# Patient Record
Sex: FEMALE | Race: WHITE | NOT HISPANIC OR LATINO | Employment: PART TIME | URBAN - METROPOLITAN AREA
[De-identification: names, ages, dates, MRNs, and addresses within clinical notes are randomized per-mention and may not be internally consistent; named-entity substitution may affect disease eponyms.]

---

## 2017-01-09 ENCOUNTER — INFUSION THERAPY VISIT (OUTPATIENT)
Dept: INFUSION THERAPY | Facility: CLINIC | Age: 37
End: 2017-01-09
Attending: ALLERGY & IMMUNOLOGY
Payer: COMMERCIAL

## 2017-01-09 ENCOUNTER — TELEPHONE (OUTPATIENT)
Dept: SURGERY | Facility: CLINIC | Age: 37
End: 2017-01-09

## 2017-01-09 ENCOUNTER — OFFICE VISIT (OUTPATIENT)
Dept: FAMILY MEDICINE | Facility: CLINIC | Age: 37
End: 2017-01-09
Payer: COMMERCIAL

## 2017-01-09 VITALS
SYSTOLIC BLOOD PRESSURE: 121 MMHG | WEIGHT: 164 LBS | DIASTOLIC BLOOD PRESSURE: 88 MMHG | BODY MASS INDEX: 30.18 KG/M2 | HEIGHT: 62 IN | HEART RATE: 94 BPM | TEMPERATURE: 98 F

## 2017-01-09 VITALS — SYSTOLIC BLOOD PRESSURE: 137 MMHG | DIASTOLIC BLOOD PRESSURE: 95 MMHG | TEMPERATURE: 98.2 F | HEART RATE: 94 BPM

## 2017-01-09 DIAGNOSIS — M06.9 RHEUMATOID ARTHRITIS, INVOLVING UNSPECIFIED SITE, UNSPECIFIED RHEUMATOID FACTOR PRESENCE: ICD-10-CM

## 2017-01-09 DIAGNOSIS — G89.4 CHRONIC PAIN SYNDROME: ICD-10-CM

## 2017-01-09 DIAGNOSIS — K83.4 SPASM OF SPHINCTER OF ODDI: ICD-10-CM

## 2017-01-09 DIAGNOSIS — Z98.890 S/P NISSEN FUNDOPLICATION (WITHOUT GASTROSTOMY TUBE) PROCEDURE: ICD-10-CM

## 2017-01-09 DIAGNOSIS — L50.8 CHRONIC URTICARIA: Primary | ICD-10-CM

## 2017-01-09 DIAGNOSIS — F41.8 SITUATIONAL ANXIETY: Primary | ICD-10-CM

## 2017-01-09 PROCEDURE — 99213 OFFICE O/P EST LOW 20 MIN: CPT | Performed by: NURSE PRACTITIONER

## 2017-01-09 PROCEDURE — 96401 CHEMO ANTI-NEOPL SQ/IM: CPT

## 2017-01-09 PROCEDURE — 25000128 H RX IP 250 OP 636: Performed by: EMERGENCY MEDICINE

## 2017-01-09 RX ORDER — HYDROXYZINE HYDROCHLORIDE 25 MG/1
25-100 TABLET, FILM COATED ORAL EVERY 6 HOURS PRN
Qty: 120 TABLET | Refills: 1 | Status: SHIPPED | OUTPATIENT
Start: 2017-01-09

## 2017-01-09 RX ORDER — HYDROCODONE BITARTRATE AND ACETAMINOPHEN 5; 325 MG/1; MG/1
1 TABLET ORAL EVERY 6 HOURS PRN
Qty: 60 TABLET | Refills: 0 | Status: SHIPPED | OUTPATIENT
Start: 2017-01-09 | End: 2019-05-22

## 2017-01-09 RX ADMIN — OMALIZUMAB 300 MG: 202.5 INJECTION, SOLUTION SUBCUTANEOUS at 10:32

## 2017-01-09 NOTE — NURSING NOTE
"Chief Complaint   Patient presents with     Hospital F/U     Establish Care     to be able to cosign orders in infusion     Refill Request     hydrocodone and hydroxazine       Initial /88 mmHg  Pulse 94  Temp(Src) 98  F (36.7  C) (Oral)  Ht 5' 1.5\" (1.562 m)  Wt 164 lb (74.39 kg)  BMI 30.49 kg/m2  LMP 11/21/2001 Estimated body mass index is 30.49 kg/(m^2) as calculated from the following:    Height as of this encounter: 5' 1.5\" (1.562 m).    Weight as of this encounter: 164 lb (74.39 kg).  BP completed using cuff size: regular  "

## 2017-01-09 NOTE — PROGRESS NOTES
Infusion Nursing Note:  Demetri Mosley presents today for SQ Xolair and 30 minutes observation.      Patient seen by provider today: No    Note: SQ Xolair administered x 2 injections over 1 minute each without complications. Pt. Observed for 30 minutes after the injections with no adverse reactions. Pt. To return in 2 weeks for SQ Xolair.    Intravenous Access:  No Intravenous access/labs at this visit.    Treatment Conditions:  Not Applicable.      Post Infusion Assessment:  Patient tolerated injections without incident.    Discharge Plan:   Patient and/or family verbalized understanding of discharge instructions and all questions answered.  Patient discharged in stable condition accompanied by: self.  Departure Mode: Ambulatory.    Amanda Briggs RN

## 2017-01-09 NOTE — PATIENT INSTRUCTIONS
Thank you for choosing [facility].  You may be receiving a survey in the mail from Jhonny Rosado regarding your visit today.  Please take a few minutes to complete and return the survey to let us know how we are doing.      If you have questions or concerns, please contact us via [employer] or you can contact your care team at [phone].    Our Clinic hours are:  Monday 6:40 am  to 7:00 pm  Tuesday -Friday 6:40 am to 5:00 pm    The [place] outpatient lab hours are:  Monday - Friday 6:10 am to 4:45 pm  Saturdays 7:00 am to 11:00 am  Appointments are required, call [phone]    If you have clinical questions after hours or would like to schedule an appointment,  call the clinic at [phone].

## 2017-01-09 NOTE — PROGRESS NOTES
SUBJECTIVE:                                                    Demetri Mosley is a 36 year old female who presents to clinic today for the following health issues:    Chief Complaint   Patient presents with     Hospital F/U     Refill Request     hydrocodone and hydroxazine         Hospital Follow-up Visit:/ surgical follow up    Hospital/Nursing Home/IP Rehab Facility: [facility]  Date of Admission: 12/23  Date of Discharge: 12/24  Reason(s) for Admission: GERD;  Nissen fundoplication and EGD            Problems taking medications regularly:  Having trouble swallowing       Medication changes since discharge: None       Problems adhering to non-medication therapy:  None    Summary of hospitalization:  [facility] discharge summary reviewed  Diagnostic Tests/Treatments reviewed.  Follow up needed: none  Other Healthcare Providers Involved in Patient s Care:         None  Update since discharge: improved.     Post Discharge Medication Reconciliation: discharge medications reconciled, continue medications without change.  Plan of care communicated with patient     Coding guidelines for this visit:  Type of Medical   Decision Making Face-to-Face Visit       within 7 Days of discharge Face-to-Face Visit        within 14 days of discharge   Moderate Complexity [id number]   High Complexity [id number]            Patient plans to establish care with Cheri Abbott - she is not available today.  Asking for hydrocodone and vistaril refills today.  Hydrocodone last filled in August by Dr Abbott.    She is also asking for surgical incisions to be checked.  Has noticed a bulge under one of the lap sites.  No redness or drainage from incision sites.        Problem list and histories reviewed & adjusted, as indicated.  Additional history: as documented    Problem list, Medication list, Allergies, and Medical/Social/Surgical histories reviewed in EPIC and updated as  "appropriate.    ROS:  Constitutional, HEENT, cardiovascular, pulmonary, gi and gu systems are negative, except as otherwise noted.    OBJECTIVE:                                                    /88 mmHg  Pulse 94  Temp(Src) 98  F (36.7  C) (Oral)  Ht 5' 1.5\" (1.562 m)  Wt 164 lb (74.39 kg)  BMI 30.49 kg/m2  LMP 11/21/2001  Body mass index is 30.49 kg/(m^2).  GENERAL: healthy, alert and no distress  ABDOMEN: soft, nontender, no hepatosplenomegaly, no masses and bowel sounds normal. Four lap sites present. Left sided incision is the site she is concerned about - incision is intact - no erythema or drainage. Nontender semi-firm nodule palpable under the incision - slightly larger than similar nodules under each incision - likely scar tissue.  PSYCH: mentation appears normal, affect normal/bright         ASSESSMENT/PLAN:                                                        ICD-10-CM    1. Situational anxiety F41.8 hydrOXYzine (ATARAX) 25 MG tablet   2. Rheumatoid arthritis, involving unspecified site, unspecified rheumatoid factor presence (H) M06.9 HYDROcodone-acetaminophen (NORCO) 5-325 MG per tablet - refilled in Dr Abbott's absence.   3. S/P Nissen fundoplication (without gastrostomy tube) procedure Z98.890 Incisions checked - no problems identified.  Reassured patient that areas are healing well.         The risks, benefits and treatment options of prescribed medications or other treatments have been discussed with the patient. The patient verbalized their understanding and should call or follow up if no improvement or if they develop further problems.  EDEL [facility]  "

## 2017-01-11 ENCOUNTER — OFFICE VISIT (OUTPATIENT)
Dept: SURGERY | Facility: CLINIC | Age: 37
End: 2017-01-11
Attending: CLINICAL NURSE SPECIALIST
Payer: COMMERCIAL

## 2017-01-11 VITALS
TEMPERATURE: 98.7 F | SYSTOLIC BLOOD PRESSURE: 121 MMHG | WEIGHT: 162.2 LBS | HEIGHT: 62 IN | DIASTOLIC BLOOD PRESSURE: 87 MMHG | OXYGEN SATURATION: 96 % | RESPIRATION RATE: 17 BRPM | HEART RATE: 89 BPM | BODY MASS INDEX: 29.85 KG/M2

## 2017-01-11 DIAGNOSIS — K21.9 GASTROESOPHAGEAL REFLUX DISEASE WITHOUT ESOPHAGITIS: Primary | ICD-10-CM

## 2017-01-11 PROCEDURE — 99212 OFFICE O/P EST SF 10 MIN: CPT | Mod: ZF

## 2017-01-11 ASSESSMENT — ENCOUNTER SYMPTOMS
HALLUCINATIONS: 0
CONSTIPATION: 0
SINUS CONGESTION: 1
HEMOPTYSIS: 0
SPUTUM PRODUCTION: 0
SEIZURES: 0
BOWEL INCONTINENCE: 0
WEAKNESS: 0
POLYDIPSIA: 0
DYSPNEA ON EXERTION: 0
POLYPHAGIA: 0
COUGH: 1
WEIGHT LOSS: 1
SHORTNESS OF BREATH: 0
MUSCLE WEAKNESS: 0
JOINT SWELLING: 1
PARALYSIS: 0
BLOOD IN STOOL: 0
ARTHRALGIAS: 1
FEVER: 0
SORE THROAT: 0
NIGHT SWEATS: 0
POOR WOUND HEALING: 0
TREMORS: 0
DIFFICULTY URINATING: 1
NECK MASS: 0
HEADACHES: 1
NUMBNESS: 0
STIFFNESS: 1
JAUNDICE: 0
NAUSEA: 1
RESPIRATORY PAIN: 0
DISTURBANCES IN COORDINATION: 0
BACK PAIN: 0
SNORES LOUDLY: 0
MEMORY LOSS: 0
POSTURAL DYSPNEA: 0
TINGLING: 0
FATIGUE: 1
TROUBLE SWALLOWING: 1
ALTERED TEMPERATURE REGULATION: 0
RECTAL PAIN: 0
WHEEZING: 1
DECREASED APPETITE: 1
MYALGIAS: 1
COUGH DISTURBING SLEEP: 0
TASTE DISTURBANCE: 0
DIARRHEA: 1
SKIN CHANGES: 0
MUSCLE CRAMPS: 1
LOSS OF CONSCIOUSNESS: 0
INCREASED ENERGY: 1
DIZZINESS: 0
SPEECH CHANGE: 0
HOARSE VOICE: 1
CHILLS: 0
NAIL CHANGES: 1
BLOATING: 1
RECTAL BLEEDING: 0
ABDOMINAL PAIN: 1
SINUS PAIN: 0
VOMITING: 0
HEARTBURN: 1
HEMATURIA: 0
NECK PAIN: 1
FLANK PAIN: 0
WEIGHT GAIN: 0
SMELL DISTURBANCE: 0
DYSURIA: 1

## 2017-01-11 ASSESSMENT — PAIN SCALES - GENERAL: PAINLEVEL: MODERATE PAIN (4)

## 2017-01-11 NOTE — PROGRESS NOTES
"THORACIC SURGERY FOLLOW UP VISIT    Dear Dr. Charan Vargas,  I saw Mrs. Demetri Mosley in follow-up today. The clinical summary follows:     PREOP DIAGNOSIS   GERD  PROCEDURE   1.  Laparoscopic Nissen fundoplication.    2.  Esophagogastroscopy.     DATE OF PROCEDURE  12/23/2016    COMPLICATIONS  None    INTERVAL STUDIES  None    ETOH Socially  TOB Former Smoker (quit 2010)  BMI 30.5    CARRIE Mosquera has had a really tough time since her surgery. She was not given a work release note when she was discharged from the hospital and thus was required to return to work on POD 3#. She has had a lot of bloating, gas pain, and difficulty advancing her diet. She recently noticed a bulge on the left side of her abdomen. She reported this to be mildly uncomfortable. Because of this lump, she asked to be seen this week rather than next week when her one month follow up appointment was scheduled.    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 Her and her  and kids will be going on a mini-vacation next weekend.     IMPRESSION (K21.9) Gastroesophageal reflux disease without esophagitis  (primary encounter diagnosis)  Comment: abdominal bulge  Plan: UGI next week, continue slowly advancing diet    36 year-old female with a complex medical history who is s/p Nissen fundoplication for significant GERD. She is still having some difficulty with food getting stuck and does sometimes choke on clear liquids. Some dysphagia is expected at this point post-operatively. Her bowels movements are loose which is likely related to her liquid diet. The area under her left abdominal incision does feel thick from scar tissue, however, when pushing the scar tissue aside, no discernible hernia is noted. The \"bulge\" and discomfort she is feeling is likely from scar tissue.    PLAN  I spent a total of 25 minutes with Mrs. Demetri Mosley, more than 50% of " which were spent in counseling, coordination of care, and face-to-face time. I reviewed the plan as follows:  1) UGI next week  2) Continue slowly advancing diet. We will touch base on a weekly basis to make sure that she is having success with this.  Necessary Tests & Appointments: UGI next week as scheduled  She had all her questions answered and is in agreement with the plan.  I appreciate the opportunity to participate in the care of your patient and will keep you updated.  Sincerely,

## 2017-01-11 NOTE — NURSING NOTE
"Demetri Mosley is a 36 year old female who presents for:  Chief Complaint   Patient presents with     Oncology Clinic Visit     return patient visit - 1 month related to laparscopic nissen fundoplication and EGD        Initial Vitals:  /87 mmHg  Pulse 89  Temp(Src) 98.7  F (37.1  C) (Oral)  Resp 17  Ht 1.562 m (5' 1.5\")  Wt 73.573 kg (162 lb 3.2 oz)  BMI 30.15 kg/m2  SpO2 96%  LMP 11/21/2001 Estimated body mass index is 30.15 kg/(m^2) as calculated from the following:    Height as of this encounter: 1.562 m (5' 1.5\").    Weight as of this encounter: 73.573 kg (162 lb 3.2 oz).. Body surface area is 1.79 meters squared. BP completed using cuff size: large  Moderate Pain (4) Patient's last menstrual period was 11/21/2001. Allergies and medications reviewed.     Medications: Medication refills not needed today.  Pharmacy name entered into [employer]:    [facility]  [facility] - [place], [place] - [address]  WRITTEN PRESCRIPTION REQUESTED  [facility] [place] - [address]  [facility] [place] - [address] [facility]  [id number] [place] - [address]  [facility] - [place], [place] - [address] [facility]  [id number] - [place], [place] - [address]     Comments: patient mentioned moderate pain/discomfort located in the lower left incision and joint pain. Patient stated that she reviewed her medications via tablet and that nothing has changed since surgery - during medication reconciliation.     5 minutes for nursing intake (face to face time)   Jazzmine Morales CMA          "

## 2017-01-11 NOTE — Clinical Note
1/11/2017       RE: Demetri Mosley  [address] [facility] [address]     Dear Colleague,    Thank you for referring your patient, Demetri Mosley, to the [facility]. Please see a copy of my visit note below.    THORACIC SURGERY FOLLOW UP VISIT    Dear Dr. Charan Vargas,  I saw Mrs. Demetri Mosley in follow-up today. The clinical summary follows:     PREOP DIAGNOSIS   GERD  PROCEDURE   1.  Laparoscopic Nissen fundoplication.    2.  Esophagogastroscopy.     DATE OF PROCEDURE  12/23/2016    COMPLICATIONS  None    INTERVAL STUDIES  None    ETOH Socially  TOB Former Smoker (quit 2010)  BMI 30.5    SUBJECTIVE  Demetri has had a really tough time since her surgery. She was not given a work release note when she was discharged from the hospital and thus was required to return to work on POD 3#. She has had a lot of bloating, gas pain, and difficulty advancing her diet. She recently noticed a bulge on the left side of her abdomen. She reported this to be mildly uncomfortable. Because of this lump, she asked to be seen this week rather than next week when her one month follow up appointment was scheduled.    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 Her and her  and kids will be going on a mini-vacation next weekend.     IMPRESSION (K21.9) Gastroesophageal reflux disease without esophagitis  (primary encounter diagnosis)  Comment: abdominal bulge  Plan: UGI next week, continue slowly advancing diet    36 year-old female with a complex medical history who is s/p Nissen fundoplication for significant GERD. She is still having some difficulty with food getting stuck and does sometimes choke on clear liquids. Some dysphagia is expected at this point post-operatively. Her bowels movements are loose which is likely related to her liquid diet. The area under her left abdominal incision does feel thick from scar  "tissue, however, when pushing the scar tissue aside, no discernible hernia is noted. The \"bulge\" and discomfort she is feeling is likely from scar tissue.    PLAN  I spent a total of 25 minutes with Mrs. Demetri Mosley, more than 50% of which were spent in counseling, coordination of care, and face-to-face time. I reviewed the plan as follows:  1) UGI next week  2) Continue slowly advancing diet. We will touch base on a weekly basis to make sure that she is having success with this.  Necessary Tests & Appointments: UGI next week as scheduled  She had all her questions answered and is in agreement with the plan.  I appreciate the opportunity to participate in the care of your patient and will keep you updated.  Sincerely,        EDEL Julio CNS      "

## 2017-01-23 ENCOUNTER — HOSPITAL ENCOUNTER (OUTPATIENT)
Dept: GENERAL RADIOLOGY | Facility: CLINIC | Age: 37
Discharge: HOME | End: 2017-01-23
Attending: FAMILY MEDICINE | Admitting: FAMILY MEDICINE
Payer: COMMERCIAL

## 2017-01-23 ENCOUNTER — INFUSION THERAPY VISIT (OUTPATIENT)
Dept: INFUSION THERAPY | Facility: CLINIC | Age: 37
End: 2017-01-23
Attending: ALLERGY & IMMUNOLOGY
Payer: COMMERCIAL

## 2017-01-23 VITALS — DIASTOLIC BLOOD PRESSURE: 82 MMHG | SYSTOLIC BLOOD PRESSURE: 136 MMHG | HEART RATE: 79 BPM | TEMPERATURE: 98.4 F

## 2017-01-23 DIAGNOSIS — K21.9 GASTROESOPHAGEAL REFLUX DISEASE WITHOUT ESOPHAGITIS: ICD-10-CM

## 2017-01-23 DIAGNOSIS — G89.4 CHRONIC PAIN SYNDROME: ICD-10-CM

## 2017-01-23 DIAGNOSIS — K83.4 SPASM OF SPHINCTER OF ODDI: ICD-10-CM

## 2017-01-23 DIAGNOSIS — L50.8 CHRONIC URTICARIA: Primary | ICD-10-CM

## 2017-01-23 PROCEDURE — 96401 CHEMO ANTI-NEOPL SQ/IM: CPT

## 2017-01-23 PROCEDURE — 74240 X-RAY XM UPR GI TRC 1CNTRST: CPT

## 2017-01-23 PROCEDURE — 25000128 H RX IP 250 OP 636: Performed by: EMERGENCY MEDICINE

## 2017-01-23 RX ADMIN — DIATRIZOATE MEGLUMINE AND DIATRIZOATE SODIUM 100 ML: 660; 100 SOLUTION ORAL; RECTAL at 13:56

## 2017-01-23 RX ADMIN — OMALIZUMAB 300 MG: 202.5 INJECTION, SOLUTION SUBCUTANEOUS at 11:50

## 2017-01-23 NOTE — PROGRESS NOTES
Infusion Nursing Note:  Demetri Mosley presents today for Xolair.    Patient seen by provider today: No   present during visit today: Not Applicable.    Note: n/a.    Intravenous Access:  No Intravenous access/labs at this visit.    Treatment Conditions:  Per therapy plan.      Post Infusion Assessment:  Patient tolerated injections without incident.  Patient observed for 30 minutes post injections per protocol.    Discharge Plan:   Patient discharged in stable condition accompanied by: self.  Departure Mode: Ambulatory.    Sonja Jackson RN

## 2017-01-27 ENCOUNTER — TELEPHONE (OUTPATIENT)
Dept: SURGERY | Facility: CLINIC | Age: 37
End: 2017-01-27

## 2017-01-27 DIAGNOSIS — Z01.818 PREOP TESTING: ICD-10-CM

## 2017-01-27 DIAGNOSIS — R13.10 DYSPHAGIA, UNSPECIFIED TYPE: Primary | ICD-10-CM

## 2017-01-27 NOTE — TELEPHONE ENCOUNTER
Call to Demetri to let her know that her UGI shows narrowing of the esophagus at the level of the aortic arch as well as at the GE junction where the Nissen wrap is. The study shows difficulty passing the barium tablet at both of these posts. Demetri is still not able to advance her diet. She can just tolerate liquids-no thicker than Ensure. If she tries anything beyond that, it sticks and she regurgitates it back up.    I will get her scheduled for an EGD dilation in the OR. She will get a pre-op H&P.

## 2017-01-31 ENCOUNTER — ANESTHESIA EVENT (OUTPATIENT)
Dept: SURGERY | Facility: CLINIC | Age: 37
End: 2017-01-31
Payer: COMMERCIAL

## 2017-01-31 ASSESSMENT — LIFESTYLE VARIABLES: TOBACCO_USE: 1

## 2017-01-31 NOTE — ANESTHESIA PREPROCEDURE EVALUATION
Anesthesia Evaluation     . Pt has had prior anesthetic. Type: MAC and General    History of anesthetic complications  - PONV    ROS/MED HX    ENT/Pulmonary:     (+)tobacco use, Past use Intermittent asthma Treatment: Inhaler daily,  , . .    Neurologic:     (+)migraines,     Cardiovascular:     (+) hypertension----. Taking blood thinners : . . . :. . Previous cardiac testing Echodate:8/12/2016results:Sinus tachycardiadate: results:ECG reviewed date:5/15/2016 results:Left ventricular systolic function is normal.  The visual ejection fraction is estimated at 65-70%.  The study was technically adequate. There is no comparison study available. date: results:         (-) CAD, irregular heartbeat/palpitations and stent   METS/Exercise Tolerance:  >4 METS   Hematologic:     (+) History of blood clots pt is anticoagulated, Anemia, History of Transfusion no previous transfusion reaction -      Musculoskeletal:  - neg musculoskeletal ROS (+) arthritis, , , -       GI/Hepatic:     (+) GERD Symptomatic,       Renal/Genitourinary:  - ROS Renal section negative       Endo:  - neg endo ROS   (+) Chronic steroid usage for Arthritis .      Psychiatric:     (+) psychiatric history Psychiatric Hx List: personnality disorder, ADD.      Infectious Disease:  - neg infectious disease ROS       Malignancy:      - no malignancy   Other:    (+) H/O Chronic Pain,H/O chronic opiod use ,              Physical Exam  Normal systems: cardiovascular, pulmonary and dental    Airway   Mallampati: I  TM distance: >3 FB  Neck ROM: full    Dental     Cardiovascular   Rhythm and rate: regular and normal      Pulmonary    breath sounds clear to auscultation                    Anesthesia Plan      History & Physical Review  History and physical reviewed and following examination; no interval change.    ASA Status:  3 .    NPO Status:  > 8 hours    Plan for General and ETT with Intravenous and Propofol induction. Maintenance will be Inhalation and  Balanced.    PONV prophylaxis:  Ondansetron (or other 5HT-3), Dexamethasone or Solumedrol and Scopolamine patch  Additional equipment: Videolaryngoscope      Postoperative Care  Postoperative pain management:  IV analgesics.      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 Patient recently underwent nissen fundoplication on 12/23/2016, at that time she was an easy airway with grade 1 view of chords using a c-mac.    PMHx/PSHx/ROS:  Past Medical History   Diagnosis Date     parotid mass 2012     benign, left, removed     Endometriosis 2009     total hysterectomy     Diagnostic skin and sensitization tests (aka ALLERGENS) 2/19/15 IgE tests pos. for: cat/dog/DM/T/G/RW, NEG  for fish and shellfish.     Allergic rhinitis due to animal dander      House dust mite allergy      2/19/15 IgE tests pos. for: cat/dog/DM/T/G/RW, NEG  for fish and shellfish.     DVT (deep venous thrombosis) (H) 2005     s/p long hospitalization - anticoagulated x 6 months     Uncomplicated asthma      Gastro-oesophageal reflux disease      Arthritis      rheumatoid     Migraines      Cyclic vomiting syndrome      Chronic abdominal pain      Pyelonephritis due to Escherichia coli 5/15/2016     Anemia      PONV (postoperative nausea and vomiting)        Past Surgical History   Procedure Laterality Date     Gi surgery       ERCP     Ent surgery       T&A     Gyn surgery       hysterectomy     Cholecystectomy       Head & neck surgery       tumor removed     Hc [facility] endoscopy w eus  5/30/2013     Procedure: COMBINED ENDOSCOPIC ULTRASOUND, ESOPHAGOSCOPY, GASTROSCOPY, DUODENOSCOPY (EGD);  Surgeon: Juan Yu MD;  Location: [facility]     Hysterectomy, pap no longer indicated       Inject block medial  branch cervical/thoracic/lumbar Bilateral 3/25/2015     Procedure: INJECT BLOCK MEDIAL BRANCH CERVICAL / THORACIC / LUMBAR;  Surgeon: Malvin Rivera MD;  Location: [facility]     Resect first rib Left 5/1/2015     Procedure: RESECT FIRST RIB;  Surgeon: Tien Yang MD;  Location:  OR     Orthopedic surgery       Hc esoph/gas reflux test w nasal imped >1 hr N/A 2/17/2016     Procedure: ESOPHAGEAL IMPEDENCE FUNCTION TEST WITH 24 HOUR PH GREATER THAN 1 HOUR;  Surgeon: Charan Ozuna MD;  Location: [facility]     Colonoscopy N/A 6/2/2016     Procedure: COLONOSCOPY;  Surgeon: Edvin Summers MD;  Location: [facility]     Esophagoscopy, gastroscopy, duodenoscopy (egd), combined N/A 6/2/2016     Procedure: COMBINED ESOPHAGOSCOPY, GASTROSCOPY, DUODENOSCOPY (EGD), BIOPSY SINGLE OR MULTIPLE;  Surgeon: Edvin Summers MD;  Location: [facility]     Insert port vascular access N/A 9/12/2016     Procedure: INSERT PORT VASCULAR ACCESS;  Surgeon: Juani Casiano MD;  Location: [facility]      capsule endoscopy N/A 9/13/2016     Procedure: CAPSULE/PILL CAM ENDOSCOPY;  Surgeon: Iraj Choe MD;  Location: [facility]     Laparoscopic nissen fundoplication N/A 12/23/2016     Procedure: LAPAROSCOPIC NISSEN FUNDOPLICATION;  Surgeon: Noah Brito MD;  Location: [facility]         Past Anes Hx: No personal or family h/o anesthesia problems    Soc Hx:   Social History   Substance Use Topics     Smoking status: Former Smoker -- 0.25 packs/day for 5 years     Types: Cigarettes     Quit date: 05/30/2010     Smokeless tobacco: Never Used     Alcohol Use: Yes      Comment: social - not that often.  Maybe one glass/week.       Allergies:   Allergies   Allergen Reactions     Propranolol Shortness Of Breath            Compazine Hives     Severe muscle cramping and bad anxiety     Fluoxetine Other (See Comments)     Seizures     Gabapentin Swelling     Swelling of hands and feet     Lyrica Swelling     Metoclopramide  Other (See Comments)     Very irritable.  Very irritable.     Pregabalin Swelling     Prochlorperazine Other (See Comments)     Severe muscle cramping and bad anxiety     Reglan Other (See Comments)     Very irritable.     Wheat Diarrhea     Wheat Bran GI Disturbance     Adhesive Tape Rash     Skin irritation     Contrast Dye Rash     Ct contrast dye  Rash over entire trunk     Diatrizoate Rash     CT contrast  Ct contrast dye  Rash over entire trunk     Dye Fdc Red [Red Dye] Rash     FD&C RED #40     Latex Hives and Rash     Liquid Adhesive Rash     No Clinical Screening - See Comments Rash     Diatrizoate meglumine     Penicillins Rash       Meds:   No prescriptions prior to admission       Current Outpatient Prescriptions   Medication Sig Dispense Refill     hydrOXYzine (ATARAX) 25 MG tablet Take 1-4 tablets ( mg) by mouth every 6 hours as needed for anxiety 120 tablet 1     HYDROcodone-acetaminophen (NORCO) 5-325 MG per tablet Take 1 tablet by mouth every 6 hours as needed for moderate to severe pain 60 tablet 0     metoclopramide (REGLAN) 10 MG/10ML SOLN solution Take 10 mLs (10 mg) by mouth 3 times daily (before meals) 1200 mL 0     butalbital-APAP-caffeine-codeine (FIORICET WITH CODEINE) -89-30 MG per capsule Take 1 capsule by mouth every 4 hours as needed for headaches or migraine       acetaminophen (TYLENOL) 325 MG tablet Take 2 tablets (650 mg) by mouth every 4 hours as needed for other (mild pain) 100 tablet 0     LORazepam (ATIVAN) 1 MG tablet Take 0.5-1 tablets (0.5-1 mg) by mouth nightly as needed for anxiety 30 tablet 0     ALBUTEROL IN Inhale 2 puffs into the lungs       alteplase (CATHFLO ACTIVASE) 2 MG injection by Injection route ONCE.       HEPARIN SODIUM, PORCINE, IJ        OMALIZUMAB SC Inject 300 mg Subcutaneous       potassium chloride POWD 10 mEq daily        tocilizumab (ACTEMRA) 80 MG/4ML        lactated ringers SOLN Infuse 3 TIMES WEEKLY PRN during times of exacerbation of  her nausea/vomiting/intractable abdominal pain.  Please page [phone] or call [phone] with questions regarding this therapy plan.  VORB per Dr. Angus Padgett, per protocol. 1500 mL 10     ondansetron (ZOFRAN) 2 MG/ML SOLN injection Inject 2 mLs (4 mg) into the vein as needed 3 TIMES WEEKLY PRN with PRN IV infusions  May repeat x 1 per individual infusion.  Please page the nurse at [phone] or call [phone] Option 4 with questions about this order. 2000 mL 20     cetirizine (ZYRTEC) 10 MG tablet Take 4 tablets (40 mg) by mouth At Bedtime 30 tablet      Tocilizumab (ACTEMRA) 400 MG/20ML SOLN Hold until resumed by rheumatology. Previously was injecting very 28 days.       methotrexate 50 MG/2ML injection Inject 0.8 mLs (20 mg) Subcutaneous once a week Hold until resumed by rheumatology. Previously receiving weekly on mondays       ranitidine (ZANTAC) 150 MG capsule Take 1 capsule (150 mg) by mouth At Bedtime 60 capsule 3     Prenatal Vit-Fe Fumarate-FA (PRENATAL MULTIVITAMIN  PLUS IRON) 27-0.8 MG TABS Take 1 tablet by mouth daily       mometasone-formoterol (DULERA) 200-5 MCG/ACT oral inhaler Inhale 2 puffs into the lungs 2 times daily       montelukast (SINGULAIR) 10 MG tablet Take 1 tablet (10 mg) by mouth At Bedtime 90 tablet 3     albuterol (ALBUTEROL) 108 (90 BASE) MCG/ACT inhaler Inhale 2 puffs into the lungs every 4 hours as needed for shortness of breath / dyspnea 1 Inhaler 5     lisinopril (PRINIVIL,ZESTRIL) 20 MG tablet Take 1 tablet (20 mg) by mouth daily (Patient taking differently: Take 20 mg by mouth daily Currently on hold while tocilizumab is on hold per rheumatologist) 90 tablet 3     hydrochlorothiazide (HYDRODIURIL) 25 MG tablet Take 1 tablet (25 mg) by mouth daily TAKE ONE TABLET BY MOUTH ONE TIME DAILY (Patient taking differently: Take 25 mg by mouth daily TAKE ONE TABLET BY MOUTH ONE TIME DAILY. Currently on hold while tocilizumab is on hold per rheumatologist) 90 tablet 3      "tiZANidine (ZANAFLEX) 4 MG tablet Take 1 tablet (4 mg) by mouth every 6 hours as needed for muscle spasms TAKE ONE TABLET BY MOUTH EVERY SIX HOURS AS NEEDED FOR MUSCLE SPASM 90 tablet 3     ipratropium - albuterol 0.5 mg/2.5 mg/3 mL (DUONEB) 0.5-2.5 (3) MG/3ML nebulization Take 1 vial (3 mLs) by nebulization every 4 hours as needed for shortness of breath / dyspnea or wheezing 1 Box 11     predniSONE (DELTASONE) 5 MG tablet Take 7.5 mg by mouth daily        levOCARNitine (CARNITOR) 330 MG tablet Take 1 tablet (330 mg) by mouth 3 times daily 90 tablet 6     ondansetron (ZOFRAN-ODT) 8 MG disintegrating tablet Take 1 tablet (8 mg) by mouth every 8 hours as needed for nausea 1 tablet 3 times daily. If not covered, try solution at same dosing. 90 tablet 3     temazepam (RESTORIL) 15 MG capsule Take 1 capsule (15 mg) by mouth nightly as needed for sleep Take 1 capsule by mouth at bedtime if needed for Sleep. 30 capsule 2     pantoprazole (PROTONIX) 40 MG enteric coated tablet Take 1 tablet (40 mg) by mouth daily 90 tablet 1     furosemide (LASIX) 20 MG tablet Take 0.5-1 tablets (10-20 mg) by mouth daily as needed On long work days. 30 tablet 1     cyanocobalamin (VITAMIN B12) 1000 MCG/ML injection Inject 1 mL (1,000 mcg) into the muscle every 30 days Inject  intramuscular every 4 weeks.  May give multi-use vial. 10 mL 5     B-D TB SYRINGE 27G X 1/2\" 1 ML MISC        EPIPEN 2-JOSE 0.3 MG/0.3ML injection Inject 0.3 mg into the muscle once as needed        fluticasone (FLONASE) 50 MCG/ACT nasal spray Spray 1-2 sprays into both nostrils daily 3 Package 3     FOLIC ACID PO Take 3 mg by mouth daily       ORDER FOR DME Equipment being ordered: #1 pair of fitted knee high compression hose.  25-30mgHG 1 each 0     Coenzyme Q10 (COQ-10) 100 MG CAPS Take 1 capsule by mouth every evening          Physical Exam:  Vitals: LMP 11/21/2001  BMI= There is no weight on file to calculate BMI.      Labs:  UPT: No results found for: " HCGQUANT      BMP:  Recent Labs   Lab Test  12/23/16   1534  12/21/16   1807   NA   --   140   POTASSIUM   --   3.7   CHLORIDE   --   104   CO2   --   25   BUN   --   13   CR  0.83  0.79   GLC   --   83   OLE   --   9.3     CBC:   Recent Labs   Lab Test  12/23/16   1534  12/23/16   0700  12/21/16   1807   WBC   --    --   7.3   RBC   --    --   5.10   HGB   --   14.3  14.3   HCT   --    --   43.4   MCV   --    --   85   MCH   --    --   28.0   MCHC   --    --   32.9   RDW   --    --   18.1*   PLT  191   --   254     Coags:  Recent Labs   Lab Test  09/12/16   1145   INR  1.13   PTT  25       Assessment/Plan:  - ASA 3  - GETA with standard ASA monitors, IV induction, balanced anesthetic  - Pre-induction:   - Consider albuterol Neb   - Versed 2 mg   - PIVx1   - No arterial line, No central line   - Antibiotics per surgeon  - Induction:   - C-mac w/ 3 blade   - ETT 7.0   - Propofol, Rocuronium, Fentanyl, Lidocaine  - Maintenance:   - Sevoflurane   - Analgesia: Fentanyl   - Fluids: LR 1 mL/kg/hr maint, < 1L total volume  - Emergence:   - Reversal: Sugammadex   - PONV prophylaxis: Zofran, Decadron, Scopolamine patch      Syed Truong Jr., MD  Anesthesia Resident - CA1    1/31/2017  3:20 PM

## 2017-02-01 ENCOUNTER — APPOINTMENT (OUTPATIENT)
Dept: GENERAL RADIOLOGY | Facility: CLINIC | Age: 37
End: 2017-02-01
Attending: STUDENT IN AN ORGANIZED HEALTH CARE EDUCATION/TRAINING PROGRAM
Payer: COMMERCIAL

## 2017-02-01 ENCOUNTER — ANESTHESIA (OUTPATIENT)
Dept: SURGERY | Facility: CLINIC | Age: 37
End: 2017-02-01
Payer: COMMERCIAL

## 2017-02-01 ENCOUNTER — HOSPITAL ENCOUNTER (OUTPATIENT)
Facility: CLINIC | Age: 37
Discharge: HOME | End: 2017-02-01
Attending: STUDENT IN AN ORGANIZED HEALTH CARE EDUCATION/TRAINING PROGRAM | Admitting: STUDENT IN AN ORGANIZED HEALTH CARE EDUCATION/TRAINING PROGRAM
Payer: COMMERCIAL

## 2017-02-01 VITALS
WEIGHT: 162.04 LBS | BODY MASS INDEX: 30.59 KG/M2 | HEART RATE: 97 BPM | TEMPERATURE: 97.9 F | RESPIRATION RATE: 16 BRPM | OXYGEN SATURATION: 97 % | SYSTOLIC BLOOD PRESSURE: 119 MMHG | DIASTOLIC BLOOD PRESSURE: 79 MMHG | HEIGHT: 61 IN

## 2017-02-01 DIAGNOSIS — Z01.818 PREOP TESTING: Primary | ICD-10-CM

## 2017-02-01 PROCEDURE — 40000278 XR SURGERY CARM FLUORO LESS THAN 5 MIN: Mod: TC

## 2017-02-01 PROCEDURE — 36000053 ZZH SURGERY LEVEL 2 EA 15 ADDTL MIN - UMMC: Performed by: STUDENT IN AN ORGANIZED HEALTH CARE EDUCATION/TRAINING PROGRAM

## 2017-02-01 PROCEDURE — 27210794 ZZH OR GENERAL SUPPLY STERILE: Performed by: STUDENT IN AN ORGANIZED HEALTH CARE EDUCATION/TRAINING PROGRAM

## 2017-02-01 PROCEDURE — 25800025 ZZH RX 258: Performed by: STUDENT IN AN ORGANIZED HEALTH CARE EDUCATION/TRAINING PROGRAM

## 2017-02-01 PROCEDURE — 40000170 ZZH STATISTIC PRE-PROCEDURE ASSESSMENT II: Performed by: STUDENT IN AN ORGANIZED HEALTH CARE EDUCATION/TRAINING PROGRAM

## 2017-02-01 PROCEDURE — 25000566 ZZH SEVOFLURANE, EA 15 MIN: Performed by: STUDENT IN AN ORGANIZED HEALTH CARE EDUCATION/TRAINING PROGRAM

## 2017-02-01 PROCEDURE — 25000125 ZZHC RX 250: Performed by: STUDENT IN AN ORGANIZED HEALTH CARE EDUCATION/TRAINING PROGRAM

## 2017-02-01 PROCEDURE — C1769 GUIDE WIRE: HCPCS | Performed by: STUDENT IN AN ORGANIZED HEALTH CARE EDUCATION/TRAINING PROGRAM

## 2017-02-01 PROCEDURE — 25000128 H RX IP 250 OP 636: Performed by: STUDENT IN AN ORGANIZED HEALTH CARE EDUCATION/TRAINING PROGRAM

## 2017-02-01 PROCEDURE — 37000008 ZZH ANESTHESIA TECHNICAL FEE, 1ST 30 MIN: Performed by: STUDENT IN AN ORGANIZED HEALTH CARE EDUCATION/TRAINING PROGRAM

## 2017-02-01 PROCEDURE — 36000055 ZZH SURGERY LEVEL 2 W FLUORO 1ST 30 MIN - UMMC: Performed by: STUDENT IN AN ORGANIZED HEALTH CARE EDUCATION/TRAINING PROGRAM

## 2017-02-01 PROCEDURE — 71000014 ZZH RECOVERY PHASE 1 LEVEL 2 FIRST HR: Performed by: STUDENT IN AN ORGANIZED HEALTH CARE EDUCATION/TRAINING PROGRAM

## 2017-02-01 PROCEDURE — 37000009 ZZH ANESTHESIA TECHNICAL FEE, EACH ADDTL 15 MIN: Performed by: STUDENT IN AN ORGANIZED HEALTH CARE EDUCATION/TRAINING PROGRAM

## 2017-02-01 PROCEDURE — 71000027 ZZH RECOVERY PHASE 2 EACH 15 MINS: Performed by: STUDENT IN AN ORGANIZED HEALTH CARE EDUCATION/TRAINING PROGRAM

## 2017-02-01 PROCEDURE — C9399 UNCLASSIFIED DRUGS OR BIOLOG: HCPCS | Performed by: STUDENT IN AN ORGANIZED HEALTH CARE EDUCATION/TRAINING PROGRAM

## 2017-02-01 RX ORDER — DEXAMETHASONE SODIUM PHOSPHATE 4 MG/ML
INJECTION, SOLUTION INTRA-ARTICULAR; INTRALESIONAL; INTRAMUSCULAR; INTRAVENOUS; SOFT TISSUE PRN
Status: DISCONTINUED | OUTPATIENT
Start: 2017-02-01 | End: 2017-02-01

## 2017-02-01 RX ORDER — SCOLOPAMINE TRANSDERMAL SYSTEM 1 MG/1
1 PATCH, EXTENDED RELEASE TRANSDERMAL ONCE
Status: COMPLETED | OUTPATIENT
Start: 2017-02-01 | End: 2017-02-01

## 2017-02-01 RX ORDER — HEPARIN SODIUM (PORCINE) LOCK FLUSH IV SOLN 100 UNIT/ML 100 UNIT/ML
5 SOLUTION INTRAVENOUS
Status: DISCONTINUED | OUTPATIENT
Start: 2017-02-01 | End: 2017-02-01 | Stop reason: HOSPADM

## 2017-02-01 RX ORDER — MEPERIDINE HYDROCHLORIDE 25 MG/ML
12.5 INJECTION INTRAMUSCULAR; INTRAVENOUS; SUBCUTANEOUS
Status: DISCONTINUED | OUTPATIENT
Start: 2017-02-01 | End: 2017-02-01 | Stop reason: HOSPADM

## 2017-02-01 RX ORDER — LIDOCAINE HYDROCHLORIDE 20 MG/ML
INJECTION, SOLUTION INFILTRATION; PERINEURAL PRN
Status: DISCONTINUED | OUTPATIENT
Start: 2017-02-01 | End: 2017-02-01

## 2017-02-01 RX ORDER — PROPOFOL 10 MG/ML
INJECTION, EMULSION INTRAVENOUS PRN
Status: DISCONTINUED | OUTPATIENT
Start: 2017-02-01 | End: 2017-02-01

## 2017-02-01 RX ORDER — FENTANYL CITRATE 50 UG/ML
25-50 INJECTION, SOLUTION INTRAMUSCULAR; INTRAVENOUS
Status: DISCONTINUED | OUTPATIENT
Start: 2017-02-01 | End: 2017-02-01 | Stop reason: HOSPADM

## 2017-02-01 RX ORDER — ALBUTEROL SULFATE 0.83 MG/ML
2.5 SOLUTION RESPIRATORY (INHALATION) EVERY 4 HOURS PRN
Status: DISCONTINUED | OUTPATIENT
Start: 2017-02-01 | End: 2017-02-01 | Stop reason: HOSPADM

## 2017-02-01 RX ORDER — HYDROMORPHONE HYDROCHLORIDE 1 MG/ML
.3-.5 INJECTION, SOLUTION INTRAMUSCULAR; INTRAVENOUS; SUBCUTANEOUS EVERY 10 MIN PRN
Status: DISCONTINUED | OUTPATIENT
Start: 2017-02-01 | End: 2017-02-01 | Stop reason: HOSPADM

## 2017-02-01 RX ORDER — ESMOLOL HYDROCHLORIDE 10 MG/ML
INJECTION INTRAVENOUS PRN
Status: DISCONTINUED | OUTPATIENT
Start: 2017-02-01 | End: 2017-02-01

## 2017-02-01 RX ORDER — HYDRALAZINE HYDROCHLORIDE 20 MG/ML
2.5-5 INJECTION INTRAMUSCULAR; INTRAVENOUS EVERY 10 MIN PRN
Status: DISCONTINUED | OUTPATIENT
Start: 2017-02-01 | End: 2017-02-01 | Stop reason: HOSPADM

## 2017-02-01 RX ORDER — ONDANSETRON 4 MG/1
4 TABLET, ORALLY DISINTEGRATING ORAL EVERY 30 MIN PRN
Status: DISCONTINUED | OUTPATIENT
Start: 2017-02-01 | End: 2017-02-01 | Stop reason: HOSPADM

## 2017-02-01 RX ORDER — SODIUM CHLORIDE, SODIUM LACTATE, POTASSIUM CHLORIDE, CALCIUM CHLORIDE 600; 310; 30; 20 MG/100ML; MG/100ML; MG/100ML; MG/100ML
INJECTION, SOLUTION INTRAVENOUS CONTINUOUS
Status: DISCONTINUED | OUTPATIENT
Start: 2017-02-01 | End: 2017-02-01 | Stop reason: HOSPADM

## 2017-02-01 RX ORDER — ONDANSETRON 2 MG/ML
4 INJECTION INTRAMUSCULAR; INTRAVENOUS EVERY 30 MIN PRN
Status: DISCONTINUED | OUTPATIENT
Start: 2017-02-01 | End: 2017-02-01 | Stop reason: HOSPADM

## 2017-02-01 RX ORDER — NALOXONE HYDROCHLORIDE 0.4 MG/ML
.1-.4 INJECTION, SOLUTION INTRAMUSCULAR; INTRAVENOUS; SUBCUTANEOUS
Status: DISCONTINUED | OUTPATIENT
Start: 2017-02-01 | End: 2017-02-01 | Stop reason: HOSPADM

## 2017-02-01 RX ORDER — SODIUM CHLORIDE, SODIUM LACTATE, POTASSIUM CHLORIDE, CALCIUM CHLORIDE 600; 310; 30; 20 MG/100ML; MG/100ML; MG/100ML; MG/100ML
INJECTION, SOLUTION INTRAVENOUS CONTINUOUS PRN
Status: DISCONTINUED | OUTPATIENT
Start: 2017-02-01 | End: 2017-02-01

## 2017-02-01 RX ORDER — FENTANYL CITRATE 50 UG/ML
INJECTION, SOLUTION INTRAMUSCULAR; INTRAVENOUS PRN
Status: DISCONTINUED | OUTPATIENT
Start: 2017-02-01 | End: 2017-02-01

## 2017-02-01 RX ORDER — ONDANSETRON 2 MG/ML
INJECTION INTRAMUSCULAR; INTRAVENOUS PRN
Status: DISCONTINUED | OUTPATIENT
Start: 2017-02-01 | End: 2017-02-01

## 2017-02-01 RX ADMIN — ROCURONIUM BROMIDE 50 MG: 10 INJECTION INTRAVENOUS at 09:34

## 2017-02-01 RX ADMIN — ONDANSETRON 4 MG: 2 INJECTION INTRAMUSCULAR; INTRAVENOUS at 10:17

## 2017-02-01 RX ADMIN — DEXAMETHASONE SODIUM PHOSPHATE 4 MG: 4 INJECTION, SOLUTION INTRAMUSCULAR; INTRAVENOUS at 09:34

## 2017-02-01 RX ADMIN — LIDOCAINE HYDROCHLORIDE 60 MG: 20 INJECTION, SOLUTION INFILTRATION; PERINEURAL at 09:34

## 2017-02-01 RX ADMIN — SCOPALAMINE 1 PATCH: 1 PATCH, EXTENDED RELEASE TRANSDERMAL at 10:04

## 2017-02-01 RX ADMIN — FENTANYL CITRATE 100 MCG: 50 INJECTION, SOLUTION INTRAMUSCULAR; INTRAVENOUS at 09:34

## 2017-02-01 RX ADMIN — ESMOLOL HYDROCHLORIDE 10 MG: 10 INJECTION, SOLUTION INTRAVENOUS at 09:45

## 2017-02-01 RX ADMIN — SODIUM CHLORIDE, POTASSIUM CHLORIDE, SODIUM LACTATE AND CALCIUM CHLORIDE: 600; 310; 30; 20 INJECTION, SOLUTION INTRAVENOUS at 09:30

## 2017-02-01 RX ADMIN — SUGAMMADEX 300 MG: 100 INJECTION, SOLUTION INTRAVENOUS at 10:17

## 2017-02-01 RX ADMIN — PROPOFOL 160 MG: 10 INJECTION, EMULSION INTRAVENOUS at 09:34

## 2017-02-01 RX ADMIN — MIDAZOLAM HYDROCHLORIDE 2 MG: 1 INJECTION, SOLUTION INTRAMUSCULAR; INTRAVENOUS at 09:30

## 2017-02-01 ASSESSMENT — PAIN DESCRIPTION - DESCRIPTORS: DESCRIPTORS: SORE

## 2017-02-01 NOTE — DISCHARGE INSTRUCTIONS
[facility]  Same-Day Surgery   Adult Discharge Orders & Instructions     For 24 hours after surgery    1. Get plenty of rest.  A responsible adult must stay with you for at least 24 hours after you leave the hospital.   2. Do not drive or use heavy equipment.  If you have weakness or tingling, don't drive or use heavy equipment until this feeling goes away.  3. Do not drink alcohol.  4. Avoid strenuous or risky activities.  Ask for help when climbing stairs.   5. You may feel lightheaded.  IF so, sit for a few minutes before standing.  Have someone help you get up.   6. If you have nausea (feel sick to your stomach): Drink only clear liquids such as apple juice, ginger ale, broth or 7-Up.  Rest may also help.  Be sure to drink enough fluids.  Move to a regular diet as you feel able.  7. You may have a slight fever. Call the doctor if your fever is over 100 F (37.7 C) (taken under the tongue) or lasts longer than 24 hours.  8. You may have a dry mouth, a sore throat, muscle aches or trouble sleeping.  These should go away after 24 hours.  9. Do not make important or legal decisions.   Call your doctor for any of the followin.  Signs of infection (fever, growing tenderness at the surgery site, a large amount of drainage or bleeding, severe pain, foul-smelling drainage, redness, swelling).    2. It has been over 8 to 10 hours since surgery and you are still not able to urinate (pass water).    3.  Headache for over 24 hours.    To contact a doctor, call Dr. Corry Franco @ [phone] (clinic) or [phone] (office) or:        [phone] and ask for the resident on call for Thoracic Surgery (answered 24 hours a day)      Emergency Department:    [facility]: [phone]       (TTY for hearing impaired: [phone])

## 2017-02-01 NOTE — BRIEF OP NOTE
[facility], [place]    Brief Operative Note    Pre-operative diagnosis: Dysphagia  Post-operative diagnosis same  Procedure: Procedure(s):  Esophagogastroduodenoscopy, Dilation *Latex Allergy* - Wound Class: II-Clean Contaminated  Surgeon: Surgeon(s) and Role:     * Corry Franco MD - Primary     * Francesco Shetty MD - Resident - Assisting  Anesthesia: General   Estimated blood loss: none  Drains: None  Specimens: * No specimens in log *  Findings:   All dilators easily passed across the GE junction with no resistance   Complications: None.    Francesco Shetty MD  General Surgery Resident  [phone](p)

## 2017-02-01 NOTE — IP AVS SNAPSHOT
MRN:[id number]                      After Visit Summary   2/1/2017    Demetri Mosley    MRN: [id number]           Thank you!     Thank you for choosing [facility] for your care. Our goal is always to provide you with excellent care. Hearing back from our patients is one way we can continue to improve our services. Please take a few minutes to complete the written survey that you may receive in the mail after you visit with us. Thank you!        Patient Information     Date Of Birth          1980        About your hospital stay     You were admitted on:  February 1, 2017 You last received care in the:  [facility] Day [facility]    You were discharged on:  February 1, 2017       Who to Call     For medical emergencies, please call 911.  For non-urgent questions about your medical care, please call your primary care provider or clinic, [phone]  For questions related to your surgery, please call your surgery clinic        Attending Provider     Provider    Corry Franco MD       Primary Care Provider Office Phone # Fax #    Nikolay JERONIMO Felisha [phone]       [address] [place] [address] [place] [id number]        After Care Instructions     Diet Instructions       Resume pre-procedure diet            Discharge Instructions       Please follow up with Dr. Brito in clinic in 4 weeks.                  Your next 10 appointments already scheduled     Feb 06, 2017 10:30 AM   Level 2 with ROOM 5 [facility] ([facility])    [facility]  [address] [place] [address]  [facility] [address]   [phone]            Feb 20, 2017 10:30 AM   Level 2 with ROOM 5 [facility] ([facility])    [facility]  [address]  [facility] [address]   [phone]              Further instructions from your care team       [facility],  [place]  Same-Day Surgery   Adult Discharge Orders & Instructions     For 24 hours after surgery    1. Get plenty of rest.  A responsible adult must stay with you for at least 24 hours after you leave the hospital.   2. Do not drive or use heavy equipment.  If you have weakness or tingling, don't drive or use heavy equipment until this feeling goes away.  3. Do not drink alcohol.  4. Avoid strenuous or risky activities.  Ask for help when climbing stairs.   5. You may feel lightheaded.  IF so, sit for a few minutes before standing.  Have someone help you get up.   6. If you have nausea (feel sick to your stomach): Drink only clear liquids such as apple juice, ginger ale, broth or 7-Up.  Rest may also help.  Be sure to drink enough fluids.  Move to a regular diet as you feel able.  7. You may have a slight fever. Call the doctor if your fever is over 100 F (37.7 C) (taken under the tongue) or lasts longer than 24 hours.  8. You may have a dry mouth, a sore throat, muscle aches or trouble sleeping.  These should go away after 24 hours.  9. Do not make important or legal decisions.   Call your doctor for any of the followin.  Signs of infection (fever, growing tenderness at the surgery site, a large amount of drainage or bleeding, severe pain, foul-smelling drainage, redness, swelling).    2. It has been over 8 to 10 hours since surgery and you are still not able to urinate (pass water).    3.  Headache for over 24 hours.    To contact a doctor, call Dr. Corry Franco @ [phone] (clinic) or [phone] (office) or:        [phone] and ask for the resident on call for Thoracic Surgery (answered 24 hours a day)      Emergency Department:    [facility]: [phone]       (TTY for hearing impaired: [phone])          Additional Information     If you use hormonal birth control (such as the pill, patch, ring or implants): You'll need a second form of birth control for 7 days (condoms, a diaphragm  "or contraceptive foam). While in the hospital, you received a medicine called Bridion. Your normal birth control will not work as well for a week after taking this medicine.          Pending Results     No orders found from 1/31/2017 to 2/2/2017.            Admission Information        Provider Department Dept Phone    2/1/2017 Corry Franco MD  Preop/Phase [phone]      Your Vitals Were     Blood Pressure Pulse Temperature Respirations    118/76 mmHg 97 98.5  F (36.9  C) (Axillary) 16    Height Weight BMI (Body Mass Index) Pulse Oximetry    1.549 m (5' 1\") 73.5 kg (162 lb 0.6 oz) 30.63 kg/m2 98%    Last Period             11/21/2001         [facility] Information     [facility] gives you secure access to your electronic health record. If you see a primary care provider, you can also send messages to your care team and make appointments. If you have questions, please call your primary care clinic.  If you do not have a primary care provider, please call [phone] and they will assist you.        Care EveryWhere ID     This is your Care EveryWhere ID. This could be used by other organizations to access your [facility] medical records  [id number]           Review of your medicines      CONTINUE these medicines which may have CHANGED, or have new prescriptions. If we are uncertain of the size of tablets/capsules you have at home, strength may be listed as something that might have changed.        Dose / Directions    hydrochlorothiazide 25 MG tablet   Commonly known as:  HYDRODIURIL   This may have changed:  additional instructions   Used for:  Essential hypertension, benign        Dose:  25 mg   Take 1 tablet (25 mg) by mouth daily TAKE ONE TABLET BY MOUTH ONE TIME DAILY   Quantity:  90 tablet   Refills:  3       lisinopril 20 MG tablet   Commonly known as:  PRINIVIL/ZESTRIL   This may have changed:  additional instructions   Used for:  Essential hypertension, benign        Dose:  20 mg   Take 1 tablet (20 mg) " "by mouth daily   Quantity:  90 tablet   Refills:  3         CONTINUE these medicines which have NOT CHANGED        Dose / Directions    acetaminophen 325 MG tablet   Commonly known as:  TYLENOL   Used for:  Portacath in place        Dose:  650 mg   Take 2 tablets (650 mg) by mouth every 4 hours as needed for other (mild pain)   Quantity:  100 tablet   Refills:  0       * tocilizumab 80 MG/4ML   Commonly known as:  ACTEMRA        Refills:  0       * ACTEMRA 400 MG/20ML Soln   Generic drug:  Tocilizumab        Hold until resumed by rheumatology. Previously was injecting very 28 days.   Refills:  0       albuterol 108 (90 BASE) MCG/ACT Inhaler   Commonly known as:  albuterol   Used for:  Mild intermittent asthma without complication        Dose:  2 puff   Inhale 2 puffs into the lungs every 4 hours as needed for shortness of breath / dyspnea   Quantity:  1 Inhaler   Refills:  5       ALBUTEROL IN        Dose:  2 puff   Inhale 2 puffs into the lungs   Refills:  0       B-D TB SYRINGE 27G X 1/2\" 1 ML Misc   Generic drug:  tuberculin-allergy syringes        Refills:  0       butalbital-APAP-caffeine-codeine -80-30 MG per capsule   Commonly known as:  FIORICET WITH codeine        Dose:  1 capsule   Take 1 capsule by mouth every 4 hours as needed for headaches or migraine   Refills:  0       CATHFLO ACTIVASE 2 MG injection   Generic drug:  alteplase        by Injection route ONCE.   Refills:  0       cetirizine 10 MG tablet   Commonly known as:  zyrTEC        Dose:  40 mg   Take 4 tablets (40 mg) by mouth At Bedtime   Quantity:  30 tablet   Refills:  0       CoQ-10 100 MG Caps        Dose:  1 capsule   Take 1 capsule by mouth every evening   Refills:  0       cyanocobalamin 1000 MCG/ML injection   Commonly known as:  VITAMIN B12   Used for:  Iron deficiency anemia, unspecified iron deficiency        Dose:  1 mL   Inject 1 mL (1,000 mcg) into the muscle every 30 days Inject  intramuscular every 4 weeks.  May give " multi-use vial.   Quantity:  10 mL   Refills:  5       EPIPEN 2-JOSE 0.3 MG/0.3ML injection   Generic drug:  EPINEPHrine        Dose:  0.3 mg   Inject 0.3 mg into the muscle once as needed   Refills:  0       fluticasone 50 MCG/ACT spray   Commonly known as:  FLONASE   Used for:  Allergic rhinitis        Dose:  1-2 spray   Spray 1-2 sprays into both nostrils daily   Quantity:  3 Package   Refills:  3       FOLIC ACID PO        Dose:  3 mg   Take 3 mg by mouth daily   Refills:  0       furosemide 20 MG tablet   Commonly known as:  LASIX   Used for:  Peripheral edema        Dose:  10-20 mg   Take 0.5-1 tablets (10-20 mg) by mouth daily as needed On long work days.   Quantity:  30 tablet   Refills:  1       HEPARIN SODIUM (PORCINE) IJ        Refills:  0       HYDROcodone-acetaminophen 5-325 MG per tablet   Commonly known as:  NORCO   Used for:  Rheumatoid arthritis, involving unspecified site, unspecified rheumatoid factor presence (H)        Dose:  1 tablet   Take 1 tablet by mouth every 6 hours as needed for moderate to severe pain   Quantity:  60 tablet   Refills:  0       hydrOXYzine 25 MG tablet   Commonly known as:  ATARAX   Used for:  Situational anxiety        Dose:   mg   Take 1-4 tablets ( mg) by mouth every 6 hours as needed for anxiety   Quantity:  120 tablet   Refills:  1       ipratropium - albuterol 0.5 mg/2.5 mg/3 mL 0.5-2.5 (3) MG/3ML neb solution   Commonly known as:  DUONEB   Used for:  Mild intermittent asthma without complication        Dose:  1 vial   Take 1 vial (3 mLs) by nebulization every 4 hours as needed for shortness of breath / dyspnea or wheezing   Quantity:  1 Box   Refills:  11       lactated ringers Soln   Used for:  Dehydration        Infuse 3 TIMES WEEKLY PRN during times of exacerbation of her nausea/vomiting/intractable abdominal pain.  Please page [phone] or call [phone] with questions regarding this therapy plan.  VORB per Dr. Angus Padgett, per protocol.    Quantity:  1500 mL   Refills:  10       levOCARNitine 330 MG tablet   Commonly known as:  CARNITOR   Used for:  Intractable cyclical vomiting with nausea        Dose:  330 mg   Take 1 tablet (330 mg) by mouth 3 times daily   Quantity:  90 tablet   Refills:  6       LORazepam 1 MG tablet   Commonly known as:  ATIVAN   Used for:  Situational anxiety        Dose:  0.5-1 mg   Take 0.5-1 tablets (0.5-1 mg) by mouth nightly as needed for anxiety   Quantity:  30 tablet   Refills:  0       methotrexate 50 MG/2ML injection CHEMO        Dose:  20 mg   Inject 0.8 mLs (20 mg) Subcutaneous once a week Hold until resumed by rheumatology. Previously receiving weekly on mondays   Refills:  0       metoclopramide 10 MG/10ML Soln solution   Commonly known as:  REGLAN   Used for:  Gastroesophageal reflux disease, esophagitis presence not specified        Dose:  10 mg   Take 10 mLs (10 mg) by mouth 3 times daily (before meals)   Quantity:  1200 mL   Refills:  0       mometasone-formoterol 200-5 MCG/ACT oral inhaler   Commonly known as:  DULERA        Dose:  2 puff   Inhale 2 puffs into the lungs 2 times daily   Refills:  0       montelukast 10 MG tablet   Commonly known as:  SINGULAIR   Used for:  Seasonal allergic rhinitis, Mild intermittent asthma without complication        Dose:  10 mg   Take 1 tablet (10 mg) by mouth At Bedtime   Quantity:  90 tablet   Refills:  3       OMALIZUMAB SC        Dose:  300 mg   Inject 300 mg Subcutaneous   Refills:  0       ondansetron 2 MG/ML Soln injection   Commonly known as:  ZOFRAN   Used for:  Dehydration        Dose:  4 mg   Inject 2 mLs (4 mg) into the vein as needed 3 TIMES WEEKLY PRN with PRN IV infusions  May repeat x 1 per individual infusion.  Please page the nurse at [phone] or call [phone] Option 4 with questions about this order.   Quantity:  2000 mL   Refills:  20       ondansetron 8 MG ODT tab   Commonly known as:  ZOFRAN-ODT   Used for:  Seasonal allergic rhinitis         Dose:  8 mg   Take 1 tablet (8 mg) by mouth every 8 hours as needed for nausea 1 tablet 3 times daily. If not covered, try solution at same dosing.   Quantity:  90 tablet   Refills:  3       order for DME   Used for:  Peripheral edema        Equipment being ordered: #1 pair of fitted knee high compression hose.  25-30mgHG   Quantity:  1 each   Refills:  0       pantoprazole 40 MG EC tablet   Commonly known as:  PROTONIX   Used for:  Esophageal reflux        Dose:  40 mg   Take 1 tablet (40 mg) by mouth daily   Quantity:  90 tablet   Refills:  1       potassium chloride Powd        Dose:  10 mEq   10 mEq daily   Refills:  0       predniSONE 5 MG tablet   Commonly known as:  DELTASONE   Used for:  Rheumatoid arthritis, involving unspecified site, unspecified rheumatoid factor presence (H)        Dose:  7.5 mg   Take 7.5 mg by mouth daily   Refills:  0       prenatal multivitamin  plus iron 27-0.8 MG Tabs per tablet        Dose:  1 tablet   Take 1 tablet by mouth daily   Refills:  0       ranitidine 150 MG capsule   Commonly known as:  ZANTAC   Used for:  Gastroesophageal reflux disease without esophagitis        Dose:  150 mg   Take 1 capsule (150 mg) by mouth At Bedtime   Quantity:  60 capsule   Refills:  3       temazepam 15 MG capsule   Commonly known as:  RESTORIL   Used for:  Shift work sleep disorder        Dose:  15 mg   Take 1 capsule (15 mg) by mouth nightly as needed for sleep Take 1 capsule by mouth at bedtime if needed for Sleep.   Quantity:  30 capsule   Refills:  2       tiZANidine 4 MG tablet   Commonly known as:  ZANAFLEX   Used for:  Muscle spasm        Dose:  4 mg   Take 1 tablet (4 mg) by mouth every 6 hours as needed for muscle spasms TAKE ONE TABLET BY MOUTH EVERY SIX HOURS AS NEEDED FOR MUSCLE SPASM   Quantity:  90 tablet   Refills:  3       * Notice:  This list has 2 medication(s) that are the same as other medications prescribed for you. Read the directions carefully, and ask your doctor or  "other care provider to review them with you.             Protect others around you: Learn how to safely use, store and throw away your medicines at www.disposemymeds.org.             Medication List: This is a list of all your medications and when to take them. Check marks below indicate your daily home schedule. Keep this list as a reference.      Medications           Morning Afternoon Evening Bedtime As Needed    acetaminophen 325 MG tablet   Commonly known as:  TYLENOL   Take 2 tablets (650 mg) by mouth every 4 hours as needed for other (mild pain)                                * tocilizumab 80 MG/4ML   Commonly known as:  ACTEMRA                                * ACTEMRA 400 MG/20ML Soln   Hold until resumed by rheumatology. Previously was injecting very 28 days.   Generic drug:  Tocilizumab                                albuterol 108 (90 BASE) MCG/ACT Inhaler   Commonly known as:  albuterol   Inhale 2 puffs into the lungs every 4 hours as needed for shortness of breath / dyspnea                                ALBUTEROL IN   Inhale 2 puffs into the lungs                                B-D TB SYRINGE 27G X 1/2\" 1 ML Misc   Generic drug:  tuberculin-allergy syringes                                butalbital-APAP-caffeine-codeine -79-30 MG per capsule   Commonly known as:  FIORICET WITH codeine   Take 1 capsule by mouth every 4 hours as needed for headaches or migraine                                CATHFLO ACTIVASE 2 MG injection   by Injection route ONCE.   Generic drug:  alteplase                                cetirizine 10 MG tablet   Commonly known as:  zyrTEC   Take 4 tablets (40 mg) by mouth At Bedtime                                CoQ-10 100 MG Caps   Take 1 capsule by mouth every evening                                cyanocobalamin 1000 MCG/ML injection   Commonly known as:  VITAMIN B12   Inject 1 mL (1,000 mcg) into the muscle every 30 days Inject  intramuscular every 4 weeks.  May give " multi-use vial.                                EPIPEN 2-JOSE 0.3 MG/0.3ML injection   Inject 0.3 mg into the muscle once as needed   Generic drug:  EPINEPHrine                                fluticasone 50 MCG/ACT spray   Commonly known as:  FLONASE   Spray 1-2 sprays into both nostrils daily                                FOLIC ACID PO   Take 3 mg by mouth daily                                furosemide 20 MG tablet   Commonly known as:  LASIX   Take 0.5-1 tablets (10-20 mg) by mouth daily as needed On long work days.                                HEPARIN SODIUM (PORCINE) IJ                                hydrochlorothiazide 25 MG tablet   Commonly known as:  HYDRODIURIL   Take 1 tablet (25 mg) by mouth daily TAKE ONE TABLET BY MOUTH ONE TIME DAILY                                HYDROcodone-acetaminophen 5-325 MG per tablet   Commonly known as:  NORCO   Take 1 tablet by mouth every 6 hours as needed for moderate to severe pain                                hydrOXYzine 25 MG tablet   Commonly known as:  ATARAX   Take 1-4 tablets ( mg) by mouth every 6 hours as needed for anxiety                                ipratropium - albuterol 0.5 mg/2.5 mg/3 mL 0.5-2.5 (3) MG/3ML neb solution   Commonly known as:  DUONEB   Take 1 vial (3 mLs) by nebulization every 4 hours as needed for shortness of breath / dyspnea or wheezing                                lactated ringers Soln   Infuse 3 TIMES WEEKLY PRN during times of exacerbation of her nausea/vomiting/intractable abdominal pain.  Please page [phone] or call [phone] with questions regarding this therapy plan.  VORB per Dr. Angus Padgett, per protocol.   Last time this was given:  2/1/2017  9:30 AM                                levOCARNitine 330 MG tablet   Commonly known as:  CARNITOR   Take 1 tablet (330 mg) by mouth 3 times daily                                lisinopril 20 MG tablet   Commonly known as:  PRINIVIL/ZESTRIL   Take 1 tablet (20 mg) by  mouth daily                                LORazepam 1 MG tablet   Commonly known as:  ATIVAN   Take 0.5-1 tablets (0.5-1 mg) by mouth nightly as needed for anxiety                                methotrexate 50 MG/2ML injection CHEMO   Inject 0.8 mLs (20 mg) Subcutaneous once a week Hold until resumed by rheumatology. Previously receiving weekly on mondays                                metoclopramide 10 MG/10ML Soln solution   Commonly known as:  REGLAN   Take 10 mLs (10 mg) by mouth 3 times daily (before meals)                                mometasone-formoterol 200-5 MCG/ACT oral inhaler   Commonly known as:  DULERA   Inhale 2 puffs into the lungs 2 times daily                                montelukast 10 MG tablet   Commonly known as:  SINGULAIR   Take 1 tablet (10 mg) by mouth At Bedtime                                OMALIZUMAB SC   Inject 300 mg Subcutaneous                                ondansetron 2 MG/ML Soln injection   Commonly known as:  ZOFRAN   Inject 2 mLs (4 mg) into the vein as needed 3 TIMES WEEKLY PRN with PRN IV infusions  May repeat x 1 per individual infusion.  Please page the nurse at [phone] or call [phone] Option 4 with questions about this order.   Last time this was given:  4 mg on 2/1/2017 10:17 AM                                ondansetron 8 MG ODT tab   Commonly known as:  ZOFRAN-ODT   Take 1 tablet (8 mg) by mouth every 8 hours as needed for nausea 1 tablet 3 times daily. If not covered, try solution at same dosing.                                order for DME   Equipment being ordered: #1 pair of fitted knee high compression hose.  25-30mgHG                                pantoprazole 40 MG EC tablet   Commonly known as:  PROTONIX   Take 1 tablet (40 mg) by mouth daily                                potassium chloride Powd   10 mEq daily                                predniSONE 5 MG tablet   Commonly known as:  DELTASONE   Take 7.5 mg by mouth daily                                 prenatal multivitamin  plus iron 27-0.8 MG Tabs per tablet   Take 1 tablet by mouth daily                                ranitidine 150 MG capsule   Commonly known as:  ZANTAC   Take 1 capsule (150 mg) by mouth At Bedtime                                temazepam 15 MG capsule   Commonly known as:  RESTORIL   Take 1 capsule (15 mg) by mouth nightly as needed for sleep Take 1 capsule by mouth at bedtime if needed for Sleep.                                tiZANidine 4 MG tablet   Commonly known as:  ZANAFLEX   Take 1 tablet (4 mg) by mouth every 6 hours as needed for muscle spasms TAKE ONE TABLET BY MOUTH EVERY SIX HOURS AS NEEDED FOR MUSCLE SPASM                                * Notice:  This list has 2 medication(s) that are the same as other medications prescribed for you. Read the directions carefully, and ask your doctor or other care provider to review them with you.

## 2017-02-01 NOTE — OR NURSING
Pt declined de accessing chest port. Pt said she has medication she needs to give through it tonight.

## 2017-02-01 NOTE — IP AVS SNAPSHOT
Same Day Surgery [address] [address]    Phone:  [phone]                                       After Visit Summary   2/1/2017    Demetri Mosley    MRN: [id number]           After Visit Summary Signature Page     I have received my discharge instructions, and my questions have been answered. I have discussed any challenges I see with this plan with the nurse or doctor.    ..........................................................................................................................................  Patient/Patient Representative Signature      ..........................................................................................................................................  Patient Representative Print Name and Relationship to Patient    ..................................................               ................................................  Date                                            Time    ..........................................................................................................................................  Reviewed by Signature/Title    ...................................................              ..............................................  Date                                                            Time

## 2017-02-01 NOTE — ANESTHESIA CARE TRANSFER NOTE
Patient: Demetri Mosley    COMBINED ESOPHAGOSCOPY, GASTROSCOPY, DUODENOSCOPY (EGD), DILATATION (N/A Esophagus)  Additional InformationProcedure(s):  Esophagogastroduodenoscopy, Dilation *Latex Allergy* - Wound Class: II-Clean Contaminated    Diagnosis: Dysphagia  Diagnosis Additional Information: No value filed.    Anesthesia Type:   General, ETT     Note:  Airway :Face Mask  Patient transferred to:PACU  Comments: Airway :Face Mask  Patient transferred to:PACU  Comments: Prior to extubation, patient was breathing spontaneously with appropriate respiratory rate and tidal volume. The patient was following commands, warm and demonstrated adequate strength. Patient was suctioned and extubated without complication.   Transported to PACU on 4L O2 via face mask.   VSS upon arrival to PACU.  Patient denies nausea or pain at this time.   Care transfer plan communicated and patient care transferred to PACU RN     Syed Truong Jr., MD  Anesthesia Resident - [facility]    2/1/2017  10:34 AM        Vitals: (Last set prior to Anesthesia Care Transfer)              Electronically Signed By: Syed Truong MD  February 1, 2017  10:34 AM

## 2017-02-01 NOTE — ANESTHESIA POSTPROCEDURE EVALUATION
Patient: Demetri Mosley    COMBINED ESOPHAGOSCOPY, GASTROSCOPY, DUODENOSCOPY (EGD), DILATATION (N/A Esophagus)  Additional InformationProcedure(s):  Esophagogastroduodenoscopy, Dilation *Latex Allergy* - Wound Class: II-Clean Contaminated    Diagnosis:Dysphagia  Diagnosis Additional Information: No value filed.    Anesthesia Type:  General, ETT    Note:  Anesthesia Post Evaluation    Patient location during evaluation: PACU  Patient participation: Able to fully participate in evaluation  Level of consciousness: awake  Pain management: satisfactory to patient  Airway patency: patent  Cardiovascular status: acceptable  Respiratory status: acceptable  Hydration status: acceptable  PONV: controlled     Anesthetic complications: None          Last vitals:  Filed Vitals:    02/01/17 0746   BP: 130/84   Temp: 36.4  C (97.6  F)   Resp: 16   SpO2: 99%       Electronically Signed By: Nikolay Cartagena MD  February 1, 2017  10:33 AM

## 2017-02-02 NOTE — OP NOTE
DATE OF PROCEDURE:  2017.      PREOPERATIVE DIAGNOSIS:  Dysphagia, status post laparoscopic Nissen fundoplication.      POSTOPERATIVE DIAGNOSIS:  Dysphagia, status post laparoscopic Nissen fundoplication.      PROCEDURES PERFORMED:  Esophagogastroduodenoscopy and dilation.        OPERATIVE FINDINGS:  No obvious stenotic or strictured area.  Intact Nissen fundoplication, wrap not too tight.      OPERATING SURGEON:  Brent Ramon MD.      ASSISTANT SURGEON:  Francesco Shetty MD.      DESCRIPTION OF PROCEDURE:  After obtaining informed consent, Madonna Bell was brought to the operating room and laid supine on the operating table.  After induction of general anesthesia, and introduction of an endotracheal tube an esophagogastroduodenoscopy was performed.  An adult scope was passed per orally down the esophagus, through the GE junction with absolutely no resistance.  The pylorus was traversed to enter the duodenum and there was no resistance here either.  The scope was then retroflexed and the Nissen fundoplication was visualized.  The wrap was intact.  After this, Savary dilators ranging from size 16-20 were passed easily just to reconfirm there was no obvious resistance.  The 20 mm dilator passed easily through the esophagus past the GE junction.  A repeat endoscopy confirmed no mucosal tears or any significant findings.  After this, the patient was recovered and transferred back to the recovery room in stable condition.         BRENT RAMON MD             D: 2017 17:49   T: 2017 20:08   MT:       Name:     MADONNA HARDEN   MRN:      [id number]        Account:        [id number]   :      1980           Procedure Date: 2017      Document: [id number]

## 2017-02-06 ENCOUNTER — INFUSION THERAPY VISIT (OUTPATIENT)
Dept: INFUSION THERAPY | Facility: CLINIC | Age: 37
End: 2017-02-06
Attending: ALLERGY & IMMUNOLOGY
Payer: COMMERCIAL

## 2017-02-06 VITALS
RESPIRATION RATE: 16 BRPM | TEMPERATURE: 98.1 F | HEART RATE: 80 BPM | DIASTOLIC BLOOD PRESSURE: 88 MMHG | SYSTOLIC BLOOD PRESSURE: 142 MMHG

## 2017-02-06 DIAGNOSIS — G89.4 CHRONIC PAIN SYNDROME: ICD-10-CM

## 2017-02-06 DIAGNOSIS — L50.8 CHRONIC URTICARIA: Primary | ICD-10-CM

## 2017-02-06 DIAGNOSIS — K83.4 SPASM OF SPHINCTER OF ODDI: ICD-10-CM

## 2017-02-06 PROCEDURE — 96401 CHEMO ANTI-NEOPL SQ/IM: CPT

## 2017-02-06 PROCEDURE — 25000128 H RX IP 250 OP 636: Performed by: EMERGENCY MEDICINE

## 2017-02-06 RX ADMIN — OMALIZUMAB 300 MG: 202.5 INJECTION, SOLUTION SUBCUTANEOUS at 10:42

## 2017-02-06 NOTE — PROGRESS NOTES
Infusion Nursing Note:  Demetri Mosley presents today for Xolair.    Patient seen by provider today: No   present during visit today: Not Applicable.    Note: N/A.    Intravenous Access:  NA    Treatment Conditions:  Not Applicable.      Post Infusion Assessment:  Patient tolerated injection without incident. She was observed for 30min post injections.    Discharge Plan:   Patient discharged in stable condition accompanied by: self. Scheduled to return in 2wks.    Chad Lara RN

## 2017-02-09 ENCOUNTER — TELEPHONE (OUTPATIENT)
Dept: SURGERY | Facility: CLINIC | Age: 37
End: 2017-02-09

## 2017-02-09 NOTE — TELEPHONE ENCOUNTER
"Call to Demetri to see how she was doing since her EGD dilation. She reports that \"some days are great and some days are awful.\" I explained to her that when Dr. Franco did the scope, she did not see a stricture so this may be an episodic thing. She is not feeling the need for follow up or intervention at this time. We will touch base by phone in 2 weeks. She will call me before then if things worsen. Dmeetri is in agreement with this plan.  "

## 2017-02-19 DIAGNOSIS — R11.15 INTRACTABLE CYCLICAL VOMITING WITH NAUSEA: ICD-10-CM

## 2017-02-20 ENCOUNTER — INFUSION THERAPY VISIT (OUTPATIENT)
Dept: INFUSION THERAPY | Facility: CLINIC | Age: 37
End: 2017-02-20
Attending: ALLERGY & IMMUNOLOGY
Payer: COMMERCIAL

## 2017-02-20 VITALS — TEMPERATURE: 97 F | HEART RATE: 87 BPM | SYSTOLIC BLOOD PRESSURE: 139 MMHG | DIASTOLIC BLOOD PRESSURE: 78 MMHG

## 2017-02-20 DIAGNOSIS — E53.8 VITAMIN B 12 DEFICIENCY: Primary | ICD-10-CM

## 2017-02-20 DIAGNOSIS — G89.4 CHRONIC PAIN SYNDROME: ICD-10-CM

## 2017-02-20 DIAGNOSIS — L50.8 CHRONIC URTICARIA: Primary | ICD-10-CM

## 2017-02-20 DIAGNOSIS — K83.4 SPASM OF SPHINCTER OF ODDI: ICD-10-CM

## 2017-02-20 PROCEDURE — 96401 CHEMO ANTI-NEOPL SQ/IM: CPT

## 2017-02-20 PROCEDURE — 25000128 H RX IP 250 OP 636: Performed by: EMERGENCY MEDICINE

## 2017-02-20 RX ADMIN — OMALIZUMAB 300 MG: 202.5 INJECTION, SOLUTION SUBCUTANEOUS at 10:52

## 2017-02-20 NOTE — PROGRESS NOTES
Infusion Nursing Note:  Demetri Mosley presents today for Xolair injection.    Patient seen by provider today: No   present during visit today: Not Applicable.    Note: N/A.    Intravenous Access:  No IV today.    Treatment Conditions:  Not Applicable.      Post Infusion Assessment:  Patient tolerated injection without incident.    Discharge Plan:   Pt discharged after 10 minutes observation.  She will return on 3/6 for next injection.    Dori Adhikari RN

## 2017-02-20 NOTE — TELEPHONE ENCOUNTER
Cyanocobalamin     Last Written Prescription Date: 01/04/16  Last Fill Quantity: 10ml,  # refills: 5   Last Office Visit with [facility], [facility] or [facility] prescribing provider: 01/09/17

## 2017-02-20 NOTE — MR AVS SNAPSHOT
After Visit Summary   2/20/2017    Demetri Mosley    MRN: [id number]           Patient Information     Date Of Birth          1980        Visit Information        Provider Department      2/20/2017 10:30 AM ROOM 5 [facility]        Today's Diagnoses     Chronic urticaria    -  1    Spasm of sphincter of Oddi        Chronic pain syndrome           Follow-ups after your visit        Your next 10 appointments already scheduled     Mar 06, 2017 10:30 AM CST   Level 2 with ROOM 9 [facility] ([facility])    [facility]  [address]  [facility] [address]   [phone]            Mar 20, 2017 10:30 AM CDT   Level 2 with ROOM 4 [facility] ([facility])    [facility]  [address]  [facility] [address]   [phone]            Apr 03, 2017 10:30 AM CDT   Level 2 with ROOM 2 [facility] ([facility])    [facility]  [address]  [facility] [address]   [phone]            Apr 17, 2017 10:30 AM CDT   Level 2 with ROOM 2 [facility] ([facility])    [facility]  [address]  [facility] [address]   [phone]              Who to contact     If you have questions or need follow up information about today's clinic visit or your schedule please contact [facility] directly at [phone].  Normal or non-critical lab and imaging results will be communicated to you by MyChart, letter or phone within 4 business days after the clinic has received the results. If you do not hear from us within 7 days, please contact the clinic through [employer] or phone. If you have a critical or abnormal lab result, we will notify you by phone as soon as possible.  Submit refill requests through [employer] or call your pharmacy and they will  forward the refill request to us. Please allow 3 business days for your refill to be completed.          Additional Information About Your Visit        [employer] Information     [employer] gives you secure access to your electronic health record. If you see a primary care provider, you can also send messages to your care team and make appointments. If you have questions, please call your primary care clinic.  If you do not have a primary care provider, please call [phone] and they will assist you.        Care EveryWhere ID     This is your Care EveryWhere ID. This could be used by other organizations to access your [facility] medical records  [id number]        Your Vitals Were     Pulse Temperature Last Period             87 97  F (36.1  C) (Oral) 11/21/2001          Blood Pressure from Last 3 Encounters:   02/20/17 139/78   02/06/17 142/88   02/01/17 119/79    Weight from Last 3 Encounters:   02/01/17 73.5 kg (162 lb 0.6 oz)   01/11/17 73.6 kg (162 lb 3.2 oz)   01/09/17 74.4 kg (164 lb)              We Performed the Following     Nursing observation          Today's Medication Changes          These changes are accurate as of: 2/20/17 11:06 AM.  If you have any questions, ask your nurse or doctor.               These medicines have changed or have updated prescriptions.        Dose/Directions    hydrochlorothiazide 25 MG tablet   Commonly known as:  HYDRODIURIL   This may have changed:  additional instructions   Used for:  Essential hypertension, benign        Dose:  25 mg   Take 1 tablet (25 mg) by mouth daily TAKE ONE TABLET BY MOUTH ONE TIME DAILY   Quantity:  90 tablet   Refills:  3       lisinopril 20 MG tablet   Commonly known as:  PRINIVIL/ZESTRIL   This may have changed:  additional instructions   Used for:  Essential hypertension, benign        Dose:  20 mg   Take 1 tablet (20 mg) by mouth daily   Quantity:  90 tablet   Refills:  3                Primary Care Provider Office Phone # Fax #    Nikolay JERONIMO  "Felisha [phone]       [address]  [facility] [address]        Thank you!     Thank you for choosing [facility]  for your care. Our goal is always to provide you with excellent care. Hearing back from our patients is one way we can continue to improve our services. Please take a few minutes to complete the written survey that you may receive in the mail after your visit with us. Thank you!             Your Updated Medication List - Protect others around you: Learn how to safely use, store and throw away your medicines at www.disposemymeds.org.          This list is accurate as of: 2/20/17 11:06 AM.  Always use your most recent med list.                   Brand Name Dispense Instructions for use    acetaminophen 325 MG tablet    TYLENOL    100 tablet    Take 2 tablets (650 mg) by mouth every 4 hours as needed for other (mild pain)       * tocilizumab 80 MG/4ML    ACTEMRA         * ACTEMRA 400 MG/20ML Soln   Generic drug:  Tocilizumab      Hold until resumed by rheumatology. Previously was injecting very 28 days.       albuterol 108 (90 BASE) MCG/ACT Inhaler    albuterol    1 Inhaler    Inhale 2 puffs into the lungs every 4 hours as needed for shortness of breath / dyspnea       ALBUTEROL IN      Inhale 2 puffs into the lungs       B-D TB SYRINGE 27G X 1/2\" 1 ML Misc   Generic drug:  tuberculin-allergy syringes          butalbital-APAP-caffeine-codeine -95-30 MG per capsule    FIORICET WITH codeine     Take 1 capsule by mouth every 4 hours as needed for headaches or migraine       CATHFLO ACTIVASE 2 MG injection   Generic drug:  alteplase      by Injection route ONCE.       cetirizine 10 MG tablet    zyrTEC    30 tablet    Take 4 tablets (40 mg) by mouth At Bedtime       CoQ-10 100 MG Caps      Take 1 capsule by mouth every evening       cyanocobalamin 1000 MCG/ML injection    VITAMIN B12    10 mL    Inject 1 mL (1,000 mcg) into the muscle every 30 days " Inject  intramuscular every 4 weeks.  May give multi-use vial.       EPIPEN 2-JOSE 0.3 MG/0.3ML injection   Generic drug:  EPINEPHrine      Inject 0.3 mg into the muscle once as needed       fluticasone 50 MCG/ACT spray    FLONASE    3 Package    Spray 1-2 sprays into both nostrils daily       FOLIC ACID PO      Take 3 mg by mouth daily       furosemide 20 MG tablet    LASIX    30 tablet    Take 0.5-1 tablets (10-20 mg) by mouth daily as needed On long work days.       HEPARIN SODIUM (PORCINE) IJ          hydrochlorothiazide 25 MG tablet    HYDRODIURIL    90 tablet    Take 1 tablet (25 mg) by mouth daily TAKE ONE TABLET BY MOUTH ONE TIME DAILY       HYDROcodone-acetaminophen 5-325 MG per tablet    NORCO    60 tablet    Take 1 tablet by mouth every 6 hours as needed for moderate to severe pain       hydrOXYzine 25 MG tablet    ATARAX    120 tablet    Take 1-4 tablets ( mg) by mouth every 6 hours as needed for anxiety       ipratropium - albuterol 0.5 mg/2.5 mg/3 mL 0.5-2.5 (3) MG/3ML neb solution    DUONEB    1 Box    Take 1 vial (3 mLs) by nebulization every 4 hours as needed for shortness of breath / dyspnea or wheezing       lactated ringers Soln     1500 mL    Infuse 3 TIMES WEEKLY PRN during times of exacerbation of her nausea/vomiting/intractable abdominal pain.  Please page [phone] or call [phone] with questions regarding this therapy plan.  VORB per Dr. Angus Padgett, per protocol.       levOCARNitine 330 MG tablet    CARNITOR    90 tablet    Take 1 tablet (330 mg) by mouth 3 times daily       lisinopril 20 MG tablet    PRINIVIL/ZESTRIL    90 tablet    Take 1 tablet (20 mg) by mouth daily       LORazepam 1 MG tablet    ATIVAN    30 tablet    Take 0.5-1 tablets (0.5-1 mg) by mouth nightly as needed for anxiety       methotrexate 50 MG/2ML injection CHEMO      Inject 0.8 mLs (20 mg) Subcutaneous once a week Hold until resumed by rheumatology. Previously receiving weekly on mondays        metoclopramide 10 MG/10ML Soln solution    REGLAN    1200 mL    Take 10 mLs (10 mg) by mouth 3 times daily (before meals)       mometasone-formoterol 200-5 MCG/ACT oral inhaler    DULERA     Inhale 2 puffs into the lungs 2 times daily       montelukast 10 MG tablet    SINGULAIR    90 tablet    Take 1 tablet (10 mg) by mouth At Bedtime       OMALIZUMAB SC      Inject 300 mg Subcutaneous       ondansetron 2 MG/ML Soln injection    ZOFRAN    2000 mL    Inject 2 mLs (4 mg) into the vein as needed 3 TIMES WEEKLY PRN with PRN IV infusions  May repeat x 1 per individual infusion.  Please page the nurse at [phone] or call [phone] Option 4 with questions about this order.       ondansetron 8 MG ODT tab    ZOFRAN-ODT    90 tablet    Take 1 tablet (8 mg) by mouth every 8 hours as needed for nausea 1 tablet 3 times daily. If not covered, try solution at same dosing.       order for DME     1 each    Equipment being ordered: #1 pair of fitted knee high compression hose.  25-30mgHG       pantoprazole 40 MG EC tablet    PROTONIX    90 tablet    Take 1 tablet (40 mg) by mouth daily       potassium chloride Powd      10 mEq daily       predniSONE 5 MG tablet    DELTASONE     Take 7.5 mg by mouth daily       prenatal multivitamin  plus iron 27-0.8 MG Tabs per tablet      Take 1 tablet by mouth daily       ranitidine 150 MG capsule    ZANTAC    60 capsule    Take 1 capsule (150 mg) by mouth At Bedtime       temazepam 15 MG capsule    RESTORIL    30 capsule    Take 1 capsule (15 mg) by mouth nightly as needed for sleep Take 1 capsule by mouth at bedtime if needed for Sleep.       tiZANidine 4 MG tablet    ZANAFLEX    90 tablet    Take 1 tablet (4 mg) by mouth every 6 hours as needed for muscle spasms TAKE ONE TABLET BY MOUTH EVERY SIX HOURS AS NEEDED FOR MUSCLE SPASM       * Notice:  This list has 2 medication(s) that are the same as other medications prescribed for you. Read the directions carefully, and ask your doctor or  other care provider to review them with you.

## 2017-02-23 RX ORDER — CYANOCOBALAMIN 1000 UG/ML
1 INJECTION, SOLUTION INTRAMUSCULAR; SUBCUTANEOUS
Qty: 10 ML | Refills: 5 | Status: SHIPPED | OUTPATIENT
Start: 2017-02-23

## 2017-02-23 NOTE — TELEPHONE ENCOUNTER
Routing refill request to provider for review/approval because:  Drug not on the [facility] refill protocol     Thank you  Sumaya BANEGAS RN

## 2017-02-24 RX ORDER — LEVOCARNITINE 330 MG/1
TABLET ORAL
Qty: 90 TABLET | Refills: 0 | Status: SHIPPED | OUTPATIENT
Start: 2017-02-24 | End: 2019-05-22

## 2017-02-28 ENCOUNTER — TELEPHONE (OUTPATIENT)
Dept: SURGERY | Facility: CLINIC | Age: 37
End: 2017-02-28

## 2017-03-07 ENCOUNTER — CARE COORDINATION (OUTPATIENT)
Dept: GASTROENTEROLOGY | Facility: CLINIC | Age: 37
End: 2017-03-07

## 2017-03-07 NOTE — PROGRESS NOTES
Called Demetri- She is changing insurance and she will need to resubmit the medication (levocarnitine) to her pharmacy in a couple of months.  She prefers to hold off for now with this medication as she may need a prior authorization until she gets her new insurance. She will send a Evrent message when she is ready for another prescription to be sent to her pharmacy.  For Levocarnitine 330 mcg..    Barbara HUERTAS RN Coordinator  Dr. Padgett, Dr. Chirinos & Dr. Live  [facility]Biliary  [phone] #[phone]

## 2017-03-16 ENCOUNTER — INFUSION THERAPY VISIT (OUTPATIENT)
Dept: INFUSION THERAPY | Facility: CLINIC | Age: 37
End: 2017-03-16
Attending: ALLERGY & IMMUNOLOGY
Payer: COMMERCIAL

## 2017-03-16 VITALS
HEART RATE: 90 BPM | TEMPERATURE: 98.2 F | OXYGEN SATURATION: 99 % | SYSTOLIC BLOOD PRESSURE: 142 MMHG | RESPIRATION RATE: 16 BRPM | DIASTOLIC BLOOD PRESSURE: 94 MMHG

## 2017-03-16 DIAGNOSIS — K83.4 SPASM OF SPHINCTER OF ODDI: ICD-10-CM

## 2017-03-16 DIAGNOSIS — G89.4 CHRONIC PAIN SYNDROME: ICD-10-CM

## 2017-03-16 DIAGNOSIS — L50.8 CHRONIC URTICARIA: Primary | ICD-10-CM

## 2017-03-16 PROCEDURE — 96401 CHEMO ANTI-NEOPL SQ/IM: CPT

## 2017-03-16 PROCEDURE — 25000128 H RX IP 250 OP 636: Performed by: EMERGENCY MEDICINE

## 2017-03-16 RX ADMIN — OMALIZUMAB 300 MG: 202.5 INJECTION, SOLUTION SUBCUTANEOUS at 14:58

## 2017-03-16 NOTE — MR AVS SNAPSHOT
After Visit Summary   3/16/2017    Demetri Mosley    MRN: [id number]           Patient Information     Date Of Birth          1980        Visit Information        Provider Department      3/16/2017 3:00 PM ROOM 5 [facility]        Today's Diagnoses     Chronic urticaria    -  1    Spasm of sphincter of Oddi        Chronic pain syndrome           Follow-ups after your visit        Your next 10 appointments already scheduled     Mar 30, 2017  3:00 PM CDT   Level 1 with ROOM 3 [facility] ([facility])    [facility]  [address]  [facility] [address]   [phone]            Apr 13, 2017  3:00 PM CDT   Level 1 with ROOM 2 [facility] ([facility])    [facility]  [address]  [facility] [address]   [phone]            Apr 27, 2017  3:00 PM CDT   Level 1 with ROOM 3 [facility] ([facility])    [facility]  [address]  [facility] [address]   [phone]              Who to contact     If you have questions or need follow up information about today's clinic visit or your schedule please contact [facility] directly at [phone].  Normal or non-critical lab and imaging results will be communicated to you by MyChart, letter or phone within 4 business days after the clinic has received the results. If you do not hear from us within 7 days, please contact the clinic through MyChart or phone. If you have a critical or abnormal lab result, we will notify you by phone as soon as possible.  Submit refill requests through [employer] or call your pharmacy and they will forward the refill request to us. Please allow 3 business days for your refill to be completed.          Additional Information About Your Visit        [employer] Information     [employer] gives you secure access to your  electronic health record. If you see a primary care provider, you can also send messages to your care team and make appointments. If you have questions, please call your primary care clinic.  If you do not have a primary care provider, please call [phone] and they will assist you.        Care EveryWhere ID     This is your Care EveryWhere ID. This could be used by other organizations to access your [facility] medical records  [id number]        Your Vitals Were     Pulse Temperature Respirations Last Period Pulse Oximetry       90 98.2  F (36.8  C) (Oral) 16 11/21/2001 99%        Blood Pressure from Last 3 Encounters:   03/16/17 (!) 142/94   02/20/17 139/78   02/06/17 142/88    Weight from Last 3 Encounters:   02/01/17 73.5 kg (162 lb 0.6 oz)   01/11/17 73.6 kg (162 lb 3.2 oz)   01/09/17 74.4 kg (164 lb)              We Performed the Following     Nursing observation          Today's Medication Changes          These changes are accurate as of: 3/16/17  3:46 PM.  If you have any questions, ask your nurse or doctor.               These medicines have changed or have updated prescriptions.        Dose/Directions    hydrochlorothiazide 25 MG tablet   Commonly known as:  HYDRODIURIL   This may have changed:  additional instructions   Used for:  Essential hypertension, benign        Dose:  25 mg   Take 1 tablet (25 mg) by mouth daily TAKE ONE TABLET BY MOUTH ONE TIME DAILY   Quantity:  90 tablet   Refills:  3       lisinopril 20 MG tablet   Commonly known as:  PRINIVIL/ZESTRIL   This may have changed:  additional instructions   Used for:  Essential hypertension, benign        Dose:  20 mg   Take 1 tablet (20 mg) by mouth daily   Quantity:  90 tablet   Refills:  3                Primary Care Provider Office Phone # Fax #    Nikolay Baeza [phone]       [address]  [place] [facility] [address]        Thank you!     Thank you for choosing [facility]  for your care.  "Our goal is always to provide you with excellent care. Hearing back from our patients is one way we can continue to improve our services. Please take a few minutes to complete the written survey that you may receive in the mail after your visit with us. Thank you!             Your Updated Medication List - Protect others around you: Learn how to safely use, store and throw away your medicines at www.disposemymeds.org.          This list is accurate as of: 3/16/17  3:46 PM.  Always use your most recent med list.                   Brand Name Dispense Instructions for use    acetaminophen 325 MG tablet    TYLENOL    100 tablet    Take 2 tablets (650 mg) by mouth every 4 hours as needed for other (mild pain)       * tocilizumab 80 MG/4ML    ACTEMRA     Reported on 3/16/2017       * ACTEMRA 400 MG/20ML Soln   Generic drug:  Tocilizumab      Hold until resumed by rheumatology. Previously was injecting very 28 days.       albuterol 108 (90 BASE) MCG/ACT Inhaler    albuterol    1 Inhaler    Inhale 2 puffs into the lungs every 4 hours as needed for shortness of breath / dyspnea       ALBUTEROL IN      Inhale 2 puffs into the lungs       B-D TB SYRINGE 27G X 1/2\" 1 ML Misc   Generic drug:  tuberculin-allergy syringes          butalbital-APAP-caffeine-codeine -93-30 MG per capsule    FIORICET WITH codeine     Take 1 capsule by mouth every 4 hours as needed for headaches or migraine       CATHFLO ACTIVASE 2 MG injection   Generic drug:  alteplase      by Injection route ONCE.       cetirizine 10 MG tablet    zyrTEC    30 tablet    Take 4 tablets (40 mg) by mouth At Bedtime       CoQ-10 100 MG Caps      Take 1 capsule by mouth every evening       cyanocobalamin 1000 MCG/ML injection    VITAMIN B12    10 mL    Inject 1 mL (1,000 mcg) into the muscle every 30 days Inject  intramuscular every 4 weeks.  May give multi-use vial.       EPIPEN 2-JOSE 0.3 MG/0.3ML injection   Generic drug:  EPINEPHrine      Inject 0.3 mg into the " muscle once as needed       fluticasone 50 MCG/ACT spray    FLONASE    3 Package    Spray 1-2 sprays into both nostrils daily       FOLIC ACID PO      Take 3 mg by mouth daily       furosemide 20 MG tablet    LASIX    30 tablet    Take 0.5-1 tablets (10-20 mg) by mouth daily as needed On long work days.       HEPARIN SODIUM (PORCINE) IJ          hydrochlorothiazide 25 MG tablet    HYDRODIURIL    90 tablet    Take 1 tablet (25 mg) by mouth daily TAKE ONE TABLET BY MOUTH ONE TIME DAILY       HYDROcodone-acetaminophen 5-325 MG per tablet    NORCO    60 tablet    Take 1 tablet by mouth every 6 hours as needed for moderate to severe pain       hydrOXYzine 25 MG tablet    ATARAX    120 tablet    Take 1-4 tablets ( mg) by mouth every 6 hours as needed for anxiety       ipratropium - albuterol 0.5 mg/2.5 mg/3 mL 0.5-2.5 (3) MG/3ML neb solution    DUONEB    1 Box    Take 1 vial (3 mLs) by nebulization every 4 hours as needed for shortness of breath / dyspnea or wheezing       lactated ringers Soln     1500 mL    Infuse 3 TIMES WEEKLY PRN during times of exacerbation of her nausea/vomiting/intractable abdominal pain.  Please page [phone] or call [phone] with questions regarding this therapy plan.  VORB per Dr. Angus Padgett, per protocol.       levOCARNitine 330 MG tablet    CARNITOR    90 tablet    TAKE ONE TABLET BY MOUTH THREE TIMES DAILY       lisinopril 20 MG tablet    PRINIVIL/ZESTRIL    90 tablet    Take 1 tablet (20 mg) by mouth daily       LORazepam 1 MG tablet    ATIVAN    30 tablet    Take 0.5-1 tablets (0.5-1 mg) by mouth nightly as needed for anxiety       methotrexate 50 MG/2ML injection CHEMO      Inject 0.8 mLs (20 mg) Subcutaneous once a week Hold until resumed by rheumatology. Previously receiving weekly on mondays       metoclopramide 10 MG/10ML Soln solution    REGLAN    1200 mL    Take 10 mLs (10 mg) by mouth 3 times daily (before meals)       mometasone-formoterol 200-5 MCG/ACT oral  inhaler    DULERA     Inhale 2 puffs into the lungs 2 times daily       montelukast 10 MG tablet    SINGULAIR    90 tablet    Take 1 tablet (10 mg) by mouth At Bedtime       OMALIZUMAB SC      Inject 300 mg Subcutaneous       ondansetron 2 MG/ML Soln injection    ZOFRAN    2000 mL    Inject 2 mLs (4 mg) into the vein as needed 3 TIMES WEEKLY PRN with PRN IV infusions  May repeat x 1 per individual infusion.  Please page the nurse at [phone] or call [phone] Option 4 with questions about this order.       ondansetron 8 MG ODT tab    ZOFRAN-ODT    90 tablet    Take 1 tablet (8 mg) by mouth every 8 hours as needed for nausea 1 tablet 3 times daily. If not covered, try solution at same dosing.       order for DME     1 each    Equipment being ordered: #1 pair of fitted knee high compression hose.  25-30mgHG       pantoprazole 40 MG EC tablet    PROTONIX    90 tablet    Take 1 tablet (40 mg) by mouth daily       potassium chloride Powd      10 mEq daily       predniSONE 5 MG tablet    DELTASONE     Take 7.5 mg by mouth daily       prenatal multivitamin  plus iron 27-0.8 MG Tabs per tablet      Take 1 tablet by mouth daily       ranitidine 150 MG capsule    ZANTAC    60 capsule    Take 1 capsule (150 mg) by mouth At Bedtime       temazepam 15 MG capsule    RESTORIL    30 capsule    Take 1 capsule (15 mg) by mouth nightly as needed for sleep Take 1 capsule by mouth at bedtime if needed for Sleep.       tiZANidine 4 MG tablet    ZANAFLEX    90 tablet    Take 1 tablet (4 mg) by mouth every 6 hours as needed for muscle spasms TAKE ONE TABLET BY MOUTH EVERY SIX HOURS AS NEEDED FOR MUSCLE SPASM       * Notice:  This list has 2 medication(s) that are the same as other medications prescribed for you. Read the directions carefully, and ask your doctor or other care provider to review them with you.

## 2017-03-16 NOTE — PROGRESS NOTES
"Infusion Nursing Note:  Demetri WILBUR Mosley presents today for Xolair injection    Patient seen by provider today: No   present during visit today: Not Applicable.    Note: Pt states she had Actemra yesterday and it always makes her BP increase, she is taking a couple different BP meds that she has to \"play\" with to get her BP in control after the infusion MD is aware, denies any dizziness or headaches. Tolerates injection without any concerns.    Intravenous Access:  No Intravenous access/labs at this visit.    Treatment Conditions:  Not Applicable.      Post Infusion Assessment:  Patient tolerated injection without incident.  Patient observed for 30 minutes post injection per protocol.    Discharge Plan:   Patient discharged in stable condition accompanied by: daughter.  Departure Mode: Ambulatory.    Amaya Garrison RN                      "

## 2017-03-30 ENCOUNTER — INFUSION THERAPY VISIT (OUTPATIENT)
Dept: INFUSION THERAPY | Facility: CLINIC | Age: 37
End: 2017-03-30
Attending: ALLERGY & IMMUNOLOGY
Payer: COMMERCIAL

## 2017-03-30 VITALS — SYSTOLIC BLOOD PRESSURE: 124 MMHG | DIASTOLIC BLOOD PRESSURE: 77 MMHG | HEART RATE: 75 BPM | TEMPERATURE: 98.6 F

## 2017-03-30 DIAGNOSIS — K83.4 SPASM OF SPHINCTER OF ODDI: ICD-10-CM

## 2017-03-30 DIAGNOSIS — G89.4 CHRONIC PAIN SYNDROME: ICD-10-CM

## 2017-03-30 DIAGNOSIS — L50.8 CHRONIC URTICARIA: Primary | ICD-10-CM

## 2017-03-30 PROCEDURE — 96401 CHEMO ANTI-NEOPL SQ/IM: CPT

## 2017-03-30 PROCEDURE — 25000128 H RX IP 250 OP 636: Performed by: EMERGENCY MEDICINE

## 2017-03-30 RX ADMIN — OMALIZUMAB 300 MG: 202.5 INJECTION, SOLUTION SUBCUTANEOUS at 15:23

## 2017-03-30 NOTE — MR AVS SNAPSHOT
After Visit Summary   3/30/2017    Demetri Mosley    MRN: [id number]           Patient Information     Date Of Birth          1980        Visit Information        Provider Department      3/30/2017 3:00 PM ROOM 3 [facility]        Today's Diagnoses     Chronic urticaria    -  1    Spasm of sphincter of Oddi        Chronic pain syndrome           Follow-ups after your visit        Your next 10 appointments already scheduled     Apr 13, 2017  3:00 PM CDT   Level 1 with ROOM 2 [facility] ([facility])    [facility]  [address]  [facility] [address]   [phone]            Apr 27, 2017  3:00 PM CDT   Level 1 with ROOM 3 [facility] ([facility])    [facility]  [address]  [facility] [address]   [phone]              Who to contact     If you have questions or need follow up information about today's clinic visit or your schedule please contact [facility] directly at [phone].  Normal or non-critical lab and imaging results will be communicated to you by [employer], letter or phone within 4 business days after the clinic has received the results. If you do not hear from us within 7 days, please contact the clinic through [employer] or phone. If you have a critical or abnormal lab result, we will notify you by phone as soon as possible.  Submit refill requests through [employer] or call your pharmacy and they will forward the refill request to us. Please allow 3 business days for your refill to be completed.          Additional Information About Your Visit        [employer] Information     [employer] gives you secure access to your electronic health record. If you see a primary care provider, you can also send messages to your care team and make appointments. If you have questions, please call your primary care clinic.  If you do not have a  primary care provider, please call [phone] and they will assist you.        Care EveryWhere ID     This is your Care EveryWhere ID. This could be used by other organizations to access your [facility] medical records  [id number]        Your Vitals Were     Pulse Temperature Last Period             75 98.6  F (37  C) (Oral) 11/21/2001          Blood Pressure from Last 3 Encounters:   03/30/17 124/77   03/16/17 (!) 142/94   02/20/17 139/78    Weight from Last 3 Encounters:   02/01/17 73.5 kg (162 lb 0.6 oz)   01/11/17 73.6 kg (162 lb 3.2 oz)   01/09/17 74.4 kg (164 lb)              We Performed the Following     Nursing observation          Today's Medication Changes          These changes are accurate as of: 3/30/17  3:59 PM.  If you have any questions, ask your nurse or doctor.               These medicines have changed or have updated prescriptions.        Dose/Directions    hydrochlorothiazide 25 MG tablet   Commonly known as:  HYDRODIURIL   This may have changed:  additional instructions   Used for:  Essential hypertension, benign        Dose:  25 mg   Take 1 tablet (25 mg) by mouth daily TAKE ONE TABLET BY MOUTH ONE TIME DAILY   Quantity:  90 tablet   Refills:  3       lisinopril 20 MG tablet   Commonly known as:  PRINIVIL/ZESTRIL   This may have changed:  additional instructions   Used for:  Essential hypertension, benign        Dose:  20 mg   Take 1 tablet (20 mg) by mouth daily   Quantity:  90 tablet   Refills:  3                Primary Care Provider Office Phone # Fax #    Nikolay Baeza [phone]       [address]  [place] [facility] [address]        Thank you!     Thank you for choosing [facility] [place]  for your care. Our goal is always to provide you with excellent care. Hearing back from our patients is one way we can continue to improve our services. Please take a few minutes to complete the written survey that you may receive in the mail after your visit  "with us. Thank you!             Your Updated Medication List - Protect others around you: Learn how to safely use, store and throw away your medicines at www.disposemymeds.org.          This list is accurate as of: 3/30/17  3:59 PM.  Always use your most recent med list.                   Brand Name Dispense Instructions for use    acetaminophen 325 MG tablet    TYLENOL    100 tablet    Take 2 tablets (650 mg) by mouth every 4 hours as needed for other (mild pain)       * tocilizumab 80 MG/4ML    ACTEMRA     Reported on 3/16/2017       * ACTEMRA 400 MG/20ML Soln   Generic drug:  Tocilizumab      Hold until resumed by rheumatology. Previously was injecting very 28 days.       albuterol 108 (90 BASE) MCG/ACT Inhaler    albuterol    1 Inhaler    Inhale 2 puffs into the lungs every 4 hours as needed for shortness of breath / dyspnea       ALBUTEROL IN      Inhale 2 puffs into the lungs       B-D TB SYRINGE 27G X 1/2\" 1 ML Misc   Generic drug:  tuberculin-allergy syringes          butalbital-APAP-caffeine-codeine -20-30 MG per capsule    FIORICET WITH codeine     Take 1 capsule by mouth every 4 hours as needed for headaches or migraine       CATHFLO ACTIVASE 2 MG injection   Generic drug:  alteplase      by Injection route ONCE.       cetirizine 10 MG tablet    zyrTEC    30 tablet    Take 4 tablets (40 mg) by mouth At Bedtime       CoQ-10 100 MG Caps      Take 1 capsule by mouth every evening       cyanocobalamin 1000 MCG/ML injection    VITAMIN B12    10 mL    Inject 1 mL (1,000 mcg) into the muscle every 30 days Inject  intramuscular every 4 weeks.  May give multi-use vial.       EPIPEN 2-JOSE 0.3 MG/0.3ML injection   Generic drug:  EPINEPHrine      Inject 0.3 mg into the muscle once as needed       fluticasone 50 MCG/ACT spray    FLONASE    3 Package    Spray 1-2 sprays into both nostrils daily       FOLIC ACID PO      Take 3 mg by mouth daily       furosemide 20 MG tablet    LASIX    30 tablet    Take 0.5-1 " tablets (10-20 mg) by mouth daily as needed On long work days.       HEPARIN SODIUM (PORCINE) IJ          hydrochlorothiazide 25 MG tablet    HYDRODIURIL    90 tablet    Take 1 tablet (25 mg) by mouth daily TAKE ONE TABLET BY MOUTH ONE TIME DAILY       HYDROcodone-acetaminophen 5-325 MG per tablet    NORCO    60 tablet    Take 1 tablet by mouth every 6 hours as needed for moderate to severe pain       hydrOXYzine 25 MG tablet    ATARAX    120 tablet    Take 1-4 tablets ( mg) by mouth every 6 hours as needed for anxiety       ipratropium - albuterol 0.5 mg/2.5 mg/3 mL 0.5-2.5 (3) MG/3ML neb solution    DUONEB    1 Box    Take 1 vial (3 mLs) by nebulization every 4 hours as needed for shortness of breath / dyspnea or wheezing       lactated ringers Soln     1500 mL    Infuse 3 TIMES WEEKLY PRN during times of exacerbation of her nausea/vomiting/intractable abdominal pain.  Please page [phone] or call [phone] with questions regarding this therapy plan.  VORB per Dr. Angus Padgett, per protocol.       levOCARNitine 330 MG tablet    CARNITOR    90 tablet    TAKE ONE TABLET BY MOUTH THREE TIMES DAILY       lisinopril 20 MG tablet    PRINIVIL/ZESTRIL    90 tablet    Take 1 tablet (20 mg) by mouth daily       LORazepam 1 MG tablet    ATIVAN    30 tablet    Take 0.5-1 tablets (0.5-1 mg) by mouth nightly as needed for anxiety       methotrexate 50 MG/2ML injection CHEMO      Inject 0.8 mLs (20 mg) Subcutaneous once a week Hold until resumed by rheumatology. Previously receiving weekly on mondays       metoclopramide 10 MG/10ML Soln solution    REGLAN    1200 mL    Take 10 mLs (10 mg) by mouth 3 times daily (before meals)       mometasone-formoterol 200-5 MCG/ACT oral inhaler    DULERA     Inhale 2 puffs into the lungs 2 times daily       montelukast 10 MG tablet    SINGULAIR    90 tablet    Take 1 tablet (10 mg) by mouth At Bedtime       OMALIZUMAB SC      Inject 300 mg Subcutaneous       ondansetron 2 MG/ML  Soln injection    ZOFRAN    2000 mL    Inject 2 mLs (4 mg) into the vein as needed 3 TIMES WEEKLY PRN with PRN IV infusions  May repeat x 1 per individual infusion.  Please page the nurse at [phone] or call [phone] Option 4 with questions about this order.       ondansetron 8 MG ODT tab    ZOFRAN-ODT    90 tablet    Take 1 tablet (8 mg) by mouth every 8 hours as needed for nausea 1 tablet 3 times daily. If not covered, try solution at same dosing.       order for DME     1 each    Equipment being ordered: #1 pair of fitted knee high compression hose.  25-30mgHG       pantoprazole 40 MG EC tablet    PROTONIX    90 tablet    Take 1 tablet (40 mg) by mouth daily       potassium chloride Powd      10 mEq daily       predniSONE 5 MG tablet    DELTASONE     Take 7.5 mg by mouth daily       prenatal multivitamin  plus iron 27-0.8 MG Tabs per tablet      Take 1 tablet by mouth daily       ranitidine 150 MG capsule    ZANTAC    60 capsule    Take 1 capsule (150 mg) by mouth At Bedtime       temazepam 15 MG capsule    RESTORIL    30 capsule    Take 1 capsule (15 mg) by mouth nightly as needed for sleep Take 1 capsule by mouth at bedtime if needed for Sleep.       tiZANidine 4 MG tablet    ZANAFLEX    90 tablet    Take 1 tablet (4 mg) by mouth every 6 hours as needed for muscle spasms TAKE ONE TABLET BY MOUTH EVERY SIX HOURS AS NEEDED FOR MUSCLE SPASM       * Notice:  This list has 2 medication(s) that are the same as other medications prescribed for you. Read the directions carefully, and ask your doctor or other care provider to review them with you.

## 2017-03-30 NOTE — PROGRESS NOTES
Infusion Nursing Note:  Demetri Mosley presents today for Xolair.   Patient seen by provider today: No   present during visit today: Not Applicable.    Note: N/A.    Intravenous Access:  No Intravenous access/labs at this visit.    Treatment Conditions:  Not Applicable.      Post Infusion Assessment:  Patient tolerated Xolair injections (2) to left upper arm without incident  Patient observed for 30 minutes post Xolair per protocol.    Discharge Plan:   Patient discharged in stable condition accompanied by: self.  Departure Mode: Ambulatory.  Pt to return on 4/13/17 at 3:00 pm for next Xolair injection.      Chika Tejada RN

## 2017-04-13 ENCOUNTER — INFUSION THERAPY VISIT (OUTPATIENT)
Dept: INFUSION THERAPY | Facility: CLINIC | Age: 37
End: 2017-04-13
Attending: ALLERGY & IMMUNOLOGY
Payer: COMMERCIAL

## 2017-04-13 VITALS — DIASTOLIC BLOOD PRESSURE: 79 MMHG | TEMPERATURE: 97.9 F | HEART RATE: 63 BPM | SYSTOLIC BLOOD PRESSURE: 148 MMHG

## 2017-04-13 DIAGNOSIS — L50.8 CHRONIC URTICARIA: Primary | ICD-10-CM

## 2017-04-13 DIAGNOSIS — G89.4 CHRONIC PAIN SYNDROME: ICD-10-CM

## 2017-04-13 DIAGNOSIS — K83.4 SPASM OF SPHINCTER OF ODDI: ICD-10-CM

## 2017-04-13 PROCEDURE — 96401 CHEMO ANTI-NEOPL SQ/IM: CPT

## 2017-04-13 PROCEDURE — 25000128 H RX IP 250 OP 636: Performed by: EMERGENCY MEDICINE

## 2017-04-13 RX ADMIN — OMALIZUMAB 300 MG: 202.5 INJECTION, SOLUTION SUBCUTANEOUS at 15:18

## 2017-04-13 NOTE — MR AVS SNAPSHOT
After Visit Summary   4/13/2017    Demetri Mosley    MRN: [id number]           Patient Information     Date Of Birth          1980        Visit Information        Provider Department      4/13/2017 3:00 PM ROOM 2 [facility]        Today's Diagnoses     Chronic urticaria    -  1    Spasm of sphincter of Oddi        Chronic pain syndrome           Follow-ups after your visit        Your next 10 appointments already scheduled     Apr 27, 2017  3:00 PM CDT   Level 1 with ROOM 3 [facility] ([facility])    n [facility]  [address]  [facility] [address]   [phone]              Who to contact     If you have questions or need follow up information about today's clinic visit or your schedule please contact [facility] directly at [phone].  Normal or non-critical lab and imaging results will be communicated to you by InnoCytehart, letter or phone within 4 business days after the clinic has received the results. If you do not hear from us within 7 days, please contact the clinic through InnoCytehart or phone. If you have a critical or abnormal lab result, we will notify you by phone as soon as possible.  Submit refill requests through [employer] or call your pharmacy and they will forward the refill request to us. Please allow 3 business days for your refill to be completed.          Additional Information About Your Visit        MyChart Information     [employer] gives you secure access to your electronic health record. If you see a primary care provider, you can also send messages to your care team and make appointments. If you have questions, please call your primary care clinic.  If you do not have a primary care provider, please call [phone] and they will assist you.        Care EveryWhere ID     This is your Care EveryWhere ID. This could be used by other organizations to access your [facility]  records  [id number]        Your Vitals Were     Pulse Temperature Last Period             63 97.9  F (36.6  C) (Oral) 11/21/2001          Blood Pressure from Last 3 Encounters:   04/13/17 148/79   03/30/17 124/77   03/16/17 (!) 142/94    Weight from Last 3 Encounters:   02/01/17 73.5 kg (162 lb 0.6 oz)   01/11/17 73.6 kg (162 lb 3.2 oz)   01/09/17 74.4 kg (164 lb)              We Performed the Following     MD Instruction for Therapy Plan          Today's Medication Changes          These changes are accurate as of: 4/13/17  3:53 PM.  If you have any questions, ask your nurse or doctor.               These medicines have changed or have updated prescriptions.        Dose/Directions    hydrochlorothiazide 25 MG tablet   Commonly known as:  HYDRODIURIL   This may have changed:  additional instructions   Used for:  Essential hypertension, benign        Dose:  25 mg   Take 1 tablet (25 mg) by mouth daily TAKE ONE TABLET BY MOUTH ONE TIME DAILY   Quantity:  90 tablet   Refills:  3       lisinopril 20 MG tablet   Commonly known as:  PRINIVIL/ZESTRIL   This may have changed:  additional instructions   Used for:  Essential hypertension, benign        Dose:  20 mg   Take 1 tablet (20 mg) by mouth daily   Quantity:  90 tablet   Refills:  3                Primary Care Provider Office Phone # Fax #    Nikolay JERONIMO Toñitokyle [phone]       [address]  [place] [facility] [address]        Thank you!     Thank you for choosing [facility]  for your care. Our goal is always to provide you with excellent care. Hearing back from our patients is one way we can continue to improve our services. Please take a few minutes to complete the written survey that you may receive in the mail after your visit with us. Thank you!             Your Updated Medication List - Protect others around you: Learn how to safely use, store and throw away your medicines at www.disposemymeds.org.          This list  "is accurate as of: 4/13/17  3:53 PM.  Always use your most recent med list.                   Brand Name Dispense Instructions for use    acetaminophen 325 MG tablet    TYLENOL    100 tablet    Take 2 tablets (650 mg) by mouth every 4 hours as needed for other (mild pain)       * tocilizumab 80 MG/4ML    ACTEMRA     Reported on 3/16/2017       * ACTEMRA 400 MG/20ML Soln   Generic drug:  Tocilizumab      Hold until resumed by rheumatology. Previously was injecting very 28 days.       albuterol 108 (90 BASE) MCG/ACT Inhaler    albuterol    1 Inhaler    Inhale 2 puffs into the lungs every 4 hours as needed for shortness of breath / dyspnea       ALBUTEROL IN      Inhale 2 puffs into the lungs       B-D TB SYRINGE 27G X 1/2\" 1 ML Misc   Generic drug:  tuberculin-allergy syringes          butalbital-APAP-caffeine-codeine -36-30 MG per capsule    FIORICET WITH codeine     Take 1 capsule by mouth every 4 hours as needed for headaches or migraine       CATHFLO ACTIVASE 2 MG injection   Generic drug:  alteplase      by Injection route ONCE.       cetirizine 10 MG tablet    zyrTEC    30 tablet    Take 4 tablets (40 mg) by mouth At Bedtime       CoQ-10 100 MG Caps      Take 1 capsule by mouth every evening       cyanocobalamin 1000 MCG/ML injection    VITAMIN B12    10 mL    Inject 1 mL (1,000 mcg) into the muscle every 30 days Inject  intramuscular every 4 weeks.  May give multi-use vial.       EPIPEN 2-JOSE 0.3 MG/0.3ML injection   Generic drug:  EPINEPHrine      Inject 0.3 mg into the muscle once as needed       fluticasone 50 MCG/ACT spray    FLONASE    3 Package    Spray 1-2 sprays into both nostrils daily       FOLIC ACID PO      Take 3 mg by mouth daily       furosemide 20 MG tablet    LASIX    30 tablet    Take 0.5-1 tablets (10-20 mg) by mouth daily as needed On long work days.       HEPARIN SODIUM (PORCINE) IJ          hydrochlorothiazide 25 MG tablet    HYDRODIURIL    90 tablet    Take 1 tablet (25 mg) by mouth " daily TAKE ONE TABLET BY MOUTH ONE TIME DAILY       HYDROcodone-acetaminophen 5-325 MG per tablet    NORCO    60 tablet    Take 1 tablet by mouth every 6 hours as needed for moderate to severe pain       hydrOXYzine 25 MG tablet    ATARAX    120 tablet    Take 1-4 tablets ( mg) by mouth every 6 hours as needed for anxiety       ipratropium - albuterol 0.5 mg/2.5 mg/3 mL 0.5-2.5 (3) MG/3ML neb solution    DUONEB    1 Box    Take 1 vial (3 mLs) by nebulization every 4 hours as needed for shortness of breath / dyspnea or wheezing       lactated ringers Soln     1500 mL    Infuse 3 TIMES WEEKLY PRN during times of exacerbation of her nausea/vomiting/intractable abdominal pain.  Please page [phone] or call [phone] with questions regarding this therapy plan.  VORB per Dr. Angus Padgett, per protocol.       levOCARNitine 330 MG tablet    CARNITOR    90 tablet    TAKE ONE TABLET BY MOUTH THREE TIMES DAILY       lisinopril 20 MG tablet    PRINIVIL/ZESTRIL    90 tablet    Take 1 tablet (20 mg) by mouth daily       LORazepam 1 MG tablet    ATIVAN    30 tablet    Take 0.5-1 tablets (0.5-1 mg) by mouth nightly as needed for anxiety       methotrexate 50 MG/2ML injection CHEMO      Inject 0.8 mLs (20 mg) Subcutaneous once a week Hold until resumed by rheumatology. Previously receiving weekly on mondays       metoclopramide 10 MG/10ML Soln solution    REGLAN    1200 mL    Take 10 mLs (10 mg) by mouth 3 times daily (before meals)       mometasone-formoterol 200-5 MCG/ACT oral inhaler    DULERA     Inhale 2 puffs into the lungs 2 times daily       montelukast 10 MG tablet    SINGULAIR    90 tablet    Take 1 tablet (10 mg) by mouth At Bedtime       OMALIZUMAB SC      Inject 300 mg Subcutaneous       ondansetron 2 MG/ML Soln injection    ZOFRAN    2000 mL    Inject 2 mLs (4 mg) into the vein as needed 3 TIMES WEEKLY PRN with PRN IV infusions  May repeat x 1 per individual infusion.  Please page the nurse at  [phone] or call [phone] Option 4 with questions about this order.       ondansetron 8 MG ODT tab    ZOFRAN-ODT    90 tablet    Take 1 tablet (8 mg) by mouth every 8 hours as needed for nausea 1 tablet 3 times daily. If not covered, try solution at same dosing.       order for DME     1 each    Equipment being ordered: #1 pair of fitted knee high compression hose.  25-30mgHG       pantoprazole 40 MG EC tablet    PROTONIX    90 tablet    Take 1 tablet (40 mg) by mouth daily       potassium chloride Powd      10 mEq daily       predniSONE 5 MG tablet    DELTASONE     Take 7.5 mg by mouth daily       prenatal multivitamin  plus iron 27-0.8 MG Tabs per tablet      Take 1 tablet by mouth daily       ranitidine 150 MG capsule    ZANTAC    60 capsule    Take 1 capsule (150 mg) by mouth At Bedtime       temazepam 15 MG capsule    RESTORIL    30 capsule    Take 1 capsule (15 mg) by mouth nightly as needed for sleep Take 1 capsule by mouth at bedtime if needed for Sleep.       tiZANidine 4 MG tablet    ZANAFLEX    90 tablet    Take 1 tablet (4 mg) by mouth every 6 hours as needed for muscle spasms TAKE ONE TABLET BY MOUTH EVERY SIX HOURS AS NEEDED FOR MUSCLE SPASM       * Notice:  This list has 2 medication(s) that are the same as other medications prescribed for you. Read the directions carefully, and ask your doctor or other care provider to review them with you.

## 2017-04-13 NOTE — PROGRESS NOTES
Infusion Nursing Note:  Demetri Mosley presents today for Xolair.    Patient seen by provider today: No   present during visit today: Not Applicable.    Note: N/A.    Intravenous Access:  .    Treatment Conditions:  Not Applicable.      Post Infusion Assessment:  Patient tolerated injection without incident.    Discharge Plan:   Patient discharged in stable condition accompanied by: self.    Cheri Delgado RN

## 2017-05-16 ENCOUNTER — INFUSION THERAPY VISIT (OUTPATIENT)
Dept: INFUSION THERAPY | Facility: CLINIC | Age: 37
End: 2017-05-16
Attending: ALLERGY & IMMUNOLOGY
Payer: COMMERCIAL

## 2017-05-16 VITALS — DIASTOLIC BLOOD PRESSURE: 90 MMHG | HEART RATE: 70 BPM | SYSTOLIC BLOOD PRESSURE: 139 MMHG | TEMPERATURE: 98.7 F

## 2017-05-16 DIAGNOSIS — G89.4 CHRONIC PAIN SYNDROME: ICD-10-CM

## 2017-05-16 DIAGNOSIS — K83.4 SPASM OF SPHINCTER OF ODDI: ICD-10-CM

## 2017-05-16 DIAGNOSIS — L50.8 CHRONIC URTICARIA: Primary | ICD-10-CM

## 2017-05-16 PROCEDURE — 25000128 H RX IP 250 OP 636: Performed by: EMERGENCY MEDICINE

## 2017-05-16 PROCEDURE — 96401 CHEMO ANTI-NEOPL SQ/IM: CPT

## 2017-05-16 RX ADMIN — OMALIZUMAB 300 MG: 202.5 INJECTION, SOLUTION SUBCUTANEOUS at 14:15

## 2017-05-16 NOTE — MR AVS SNAPSHOT
After Visit Summary   5/16/2017    Demetri Mosley    MRN: [id number]           Patient Information     Date Of Birth          1980        Visit Information        Provider Department      5/16/2017 1:30 PM ROOM 10 [facility]        Today's Diagnoses     Chronic urticaria    -  1    Spasm of sphincter of Oddi        Chronic pain syndrome           Follow-ups after your visit        Your next 10 appointments already scheduled     May 31, 2017  1:30 PM CDT   Level 2 with ROOM 2 [facility] ([facility])    [facility]  [address]  [facility] [address]   [phone]            Jun 14, 2017  2:00 PM CDT   Level 2 with ROOM 5 [facility] ([facility])    [facility]  [address]  [facility] [address]   [phone]            Jun 28, 2017  2:00 PM CDT   Level 2 with ROOM 10 [facility] ([facility])    [facility]  [address]  [facility] [address]   [phone]              Who to contact     If you have questions or need follow up information about today's clinic visit or your schedule please contact [facility] directly at [phone].  Normal or non-critical lab and imaging results will be communicated to you by MyChart, letter or phone within 4 business days after the clinic has received the results. If you do not hear from us within 7 days, please contact the clinic through MyChart or phone. If you have a critical or abnormal lab result, we will notify you by phone as soon as possible.  Submit refill requests through Xsigo or call your pharmacy and they will forward the refill request to us. Please allow 3 business days for your refill to be completed.          Additional Information About Your Visit        [place] [employer] Information     [employer] gives you secure access to your  electronic health record. If you see a primary care provider, you can also send messages to your care team and make appointments. If you have questions, please call your primary care clinic.  If you do not have a primary care provider, please call [phone] and they will assist you.        Care EveryWhere ID     This is your Care EveryWhere ID. This could be used by other organizations to access your [facility] medical records  [id number]        Your Vitals Were     Pulse Temperature Last Period             70 98.7  F (37.1  C) (Oral) 11/21/2001          Blood Pressure from Last 3 Encounters:   05/16/17 139/90   04/13/17 148/79   03/30/17 124/77    Weight from Last 3 Encounters:   02/01/17 73.5 kg (162 lb 0.6 oz)   01/11/17 73.6 kg (162 lb 3.2 oz)   01/09/17 74.4 kg (164 lb)              We Performed the Following     MD Instruction for Therapy Plan          Today's Medication Changes          These changes are accurate as of: 5/16/17  3:03 PM.  If you have any questions, ask your nurse or doctor.               These medicines have changed or have updated prescriptions.        Dose/Directions    hydrochlorothiazide 25 MG tablet   Commonly known as:  HYDRODIURIL   This may have changed:  additional instructions   Used for:  Essential hypertension, benign        Dose:  25 mg   Take 1 tablet (25 mg) by mouth daily TAKE ONE TABLET BY MOUTH ONE TIME DAILY   Quantity:  90 tablet   Refills:  3       lisinopril 20 MG tablet   Commonly known as:  PRINIVIL/ZESTRIL   This may have changed:  additional instructions   Used for:  Essential hypertension, benign        Dose:  20 mg   Take 1 tablet (20 mg) by mouth daily   Quantity:  90 tablet   Refills:  3                Primary Care Provider Office Phone # Fax #    Nikolay Baeza [phone]       [address]  [place] [facility] [address]        Thank you!     Thank you for choosing [facility]  for your care. Our goal is always to  "provide you with excellent care. Hearing back from our patients is one way we can continue to improve our services. Please take a few minutes to complete the written survey that you may receive in the mail after your visit with us. Thank you!             Your Updated Medication List - Protect others around you: Learn how to safely use, store and throw away your medicines at www.disposemymeds.org.          This list is accurate as of: 5/16/17  3:03 PM.  Always use your most recent med list.                   Brand Name Dispense Instructions for use    acetaminophen 325 MG tablet    TYLENOL    100 tablet    Take 2 tablets (650 mg) by mouth every 4 hours as needed for other (mild pain)       * tocilizumab 80 MG/4ML    ACTEMRA     Reported on 3/16/2017       * ACTEMRA 400 MG/20ML Soln   Generic drug:  Tocilizumab      Hold until resumed by rheumatology. Previously was injecting very 28 days.       albuterol 108 (90 BASE) MCG/ACT Inhaler    albuterol    1 Inhaler    Inhale 2 puffs into the lungs every 4 hours as needed for shortness of breath / dyspnea       ALBUTEROL IN      Inhale 2 puffs into the lungs       B-D TB SYRINGE 27G X 1/2\" 1 ML Misc   Generic drug:  tuberculin-allergy syringes          butalbital-APAP-caffeine-codeine -32-30 MG per capsule    FIORICET WITH codeine     Take 1 capsule by mouth every 4 hours as needed for headaches or migraine       CATHFLO ACTIVASE 2 MG injection   Generic drug:  alteplase      by Injection route ONCE.       cetirizine 10 MG tablet    zyrTEC    30 tablet    Take 4 tablets (40 mg) by mouth At Bedtime       CoQ-10 100 MG Caps      Take 1 capsule by mouth every evening       cyanocobalamin 1000 MCG/ML injection    VITAMIN B12    10 mL    Inject 1 mL (1,000 mcg) into the muscle every 30 days Inject  intramuscular every 4 weeks.  May give multi-use vial.       EPIPEN 2-JOSE 0.3 MG/0.3ML injection   Generic drug:  EPINEPHrine      Inject 0.3 mg into the muscle once as needed "       fluticasone 50 MCG/ACT spray    FLONASE    3 Package    Spray 1-2 sprays into both nostrils daily       FOLIC ACID PO      Take 3 mg by mouth daily       furosemide 20 MG tablet    LASIX    30 tablet    Take 0.5-1 tablets (10-20 mg) by mouth daily as needed On long work days.       HEPARIN SODIUM (PORCINE) IJ          hydrochlorothiazide 25 MG tablet    HYDRODIURIL    90 tablet    Take 1 tablet (25 mg) by mouth daily TAKE ONE TABLET BY MOUTH ONE TIME DAILY       HYDROcodone-acetaminophen 5-325 MG per tablet    NORCO    60 tablet    Take 1 tablet by mouth every 6 hours as needed for moderate to severe pain       hydrOXYzine 25 MG tablet    ATARAX    120 tablet    Take 1-4 tablets ( mg) by mouth every 6 hours as needed for anxiety       ipratropium - albuterol 0.5 mg/2.5 mg/3 mL 0.5-2.5 (3) MG/3ML neb solution    DUONEB    1 Box    Take 1 vial (3 mLs) by nebulization every 4 hours as needed for shortness of breath / dyspnea or wheezing       lactated ringers Soln     1500 mL    Infuse 3 TIMES WEEKLY PRN during times of exacerbation of her nausea/vomiting/intractable abdominal pain.  Please page [phone] or call [phone] with questions regarding this therapy plan.  VORB per Dr. Angus Padgett, per protocol.       levOCARNitine 330 MG tablet    CARNITOR    90 tablet    TAKE ONE TABLET BY MOUTH THREE TIMES DAILY       lisinopril 20 MG tablet    PRINIVIL/ZESTRIL    90 tablet    Take 1 tablet (20 mg) by mouth daily       LORazepam 1 MG tablet    ATIVAN    30 tablet    Take 0.5-1 tablets (0.5-1 mg) by mouth nightly as needed for anxiety       methotrexate 50 MG/2ML injection CHEMO      Inject 0.8 mLs (20 mg) Subcutaneous once a week Hold until resumed by rheumatology. Previously receiving weekly on mondays       metoclopramide 10 MG/10ML Soln solution    REGLAN    1200 mL    Take 10 mLs (10 mg) by mouth 3 times daily (before meals)       mometasone-formoterol 200-5 MCG/ACT oral inhaler    DULERA      Inhale 2 puffs into the lungs 2 times daily       montelukast 10 MG tablet    SINGULAIR    90 tablet    Take 1 tablet (10 mg) by mouth At Bedtime       OMALIZUMAB SC      Inject 300 mg Subcutaneous       ondansetron 2 MG/ML Soln injection    ZOFRAN    2000 mL    Inject 2 mLs (4 mg) into the vein as needed 3 TIMES WEEKLY PRN with PRN IV infusions  May repeat x 1 per individual infusion.  Please page the nurse at [phone] or call [phone] Option 4 with questions about this order.       ondansetron 8 MG ODT tab    ZOFRAN-ODT    90 tablet    Take 1 tablet (8 mg) by mouth every 8 hours as needed for nausea 1 tablet 3 times daily. If not covered, try solution at same dosing.       order for DME     1 each    Equipment being ordered: #1 pair of fitted knee high compression hose.  25-30mgHG       pantoprazole 40 MG EC tablet    PROTONIX    90 tablet    Take 1 tablet (40 mg) by mouth daily       potassium chloride Powd      10 mEq daily       predniSONE 5 MG tablet    DELTASONE     Take 7.5 mg by mouth daily       prenatal multivitamin  plus iron 27-0.8 MG Tabs per tablet      Take 1 tablet by mouth daily       ranitidine 150 MG capsule    ZANTAC    60 capsule    Take 1 capsule (150 mg) by mouth At Bedtime       temazepam 15 MG capsule    RESTORIL    30 capsule    Take 1 capsule (15 mg) by mouth nightly as needed for sleep Take 1 capsule by mouth at bedtime if needed for Sleep.       tiZANidine 4 MG tablet    ZANAFLEX    90 tablet    Take 1 tablet (4 mg) by mouth every 6 hours as needed for muscle spasms TAKE ONE TABLET BY MOUTH EVERY SIX HOURS AS NEEDED FOR MUSCLE SPASM       * Notice:  This list has 2 medication(s) that are the same as other medications prescribed for you. Read the directions carefully, and ask your doctor or other care provider to review them with you.

## 2017-05-16 NOTE — PROGRESS NOTES
Infusion Nursing Note:  Demetri BOWLES Melany presents today for SQ Xolair.      Patient seen by provider today: No   present during visit today: Not Applicable.    Note: SQ Xolair given x 2 injections over 2 minutes each without complications. Pt. Observed for 30 minutes after her injection with no complications. Pt. To return on 5/31/17 for another dose.    Intravenous Access:  No Intravenous access/labs at this visit.    Treatment Conditions:  Not Applicable.      Post Infusion Assessment:  Patient tolerated injection without incident.    Discharge Plan:   Patient and/or family verbalized understanding of discharge instructions and all questions answered.  Patient discharged in stable condition accompanied by: self.  Departure Mode: Ambulatory.    Amanda Briggs RN

## 2017-06-26 ENCOUNTER — INFUSION THERAPY VISIT (OUTPATIENT)
Dept: INFUSION THERAPY | Facility: CLINIC | Age: 37
End: 2017-06-26
Payer: COMMERCIAL

## 2017-06-26 VITALS
DIASTOLIC BLOOD PRESSURE: 88 MMHG | HEART RATE: 78 BPM | TEMPERATURE: 98.1 F | RESPIRATION RATE: 18 BRPM | OXYGEN SATURATION: 96 % | SYSTOLIC BLOOD PRESSURE: 132 MMHG

## 2017-06-26 DIAGNOSIS — K83.4 SPASM OF SPHINCTER OF ODDI: ICD-10-CM

## 2017-06-26 DIAGNOSIS — L50.8 CHRONIC URTICARIA: Primary | ICD-10-CM

## 2017-06-26 DIAGNOSIS — G89.4 CHRONIC PAIN SYNDROME: ICD-10-CM

## 2017-06-26 PROCEDURE — 96401 CHEMO ANTI-NEOPL SQ/IM: CPT | Performed by: INTERNAL MEDICINE

## 2017-06-26 PROCEDURE — 99207 ZZC NO CHARGE LOS: CPT

## 2017-06-26 NOTE — PROGRESS NOTES
Infusion Nursing Note:  Demetri Mosley presents today for Xolair.    Patient seen by provider today: No   present during visit today: Not Applicable.    Note: N/A.    Intravenous Access:  No Intravenous access at this visit.    Treatment Conditions:  Not Applicable.      Post Infusion Assessment:  Patient tolerated injection without incident.    Discharge Plan:   Copy of AVS reviewed with patient and/or family.  Patient will return 7/14/17 for next appointment.  Patient discharged in stable condition accompanied by: self.  Departure Mode: Ambulatory.    Minda Goodrich RN

## 2017-06-26 NOTE — MR AVS SNAPSHOT
After Visit Summary   6/26/2017    Demetri Mosley    MRN: [id number]           Patient Information     Date Of Birth          1980        Visit Information        Provider Department      6/26/2017 2:00 PM BAY 2 INFUSION [facility]        Today's Diagnoses     Chronic urticaria    -  1    Spasm of sphincter of Oddi        Chronic pain syndrome           Follow-ups after your visit        Your next 10 appointments already scheduled     Jul 14, 2017 10:30 AM CDT   Level 1 with BAY 7 INFUSION   [facility] ([facility])    [address] [place] [address] [phone]            Jul 28, 2017 12:30 PM CDT   Level 1 with BAY 10 INFUSION   [facility] ([facility])    [address] [place] [address] [phone]              Who to contact     If you have questions or need follow up information about today's clinic visit or your schedule please contact [facility] directly at [phone].  Normal or non-critical lab and imaging results will be communicated to you by [employer], letter or phone within 4 business days after the clinic has received the results. If you do not hear from us within 7 days, please contact the clinic through [employer] or phone. If you have a critical or abnormal lab result, we will notify you by phone as soon as possible.  Submit refill requests through [employer] or call your pharmacy and they will forward the refill request to us. Please allow 3 business days for your refill to be completed.          Additional Information About Your Visit        [employer] Information     [employer] gives you secure access to your electronic health record. If you see a primary care provider, you can also send messages to your care team and make appointments. If you have questions, please call your primary care clinic.  If you do not have a primary care provider, please  call [phone] and they will assist you.      [employer] is an electronic gateway that provides easy, online access to your medical records. With [employer], you can request a clinic appointment, read your test results, renew a prescription or communicate with your care team.     To access your existing account, please contact your [facility] or call [phone] for assistance.        Care EveryWhere ID     This is your Care EveryWhere ID. This could be used by other organizations to access your [facility] medical records  [id number]        Your Vitals Were     Pulse Temperature Respirations Last Period Pulse Oximetry       78 98.1  F (36.7  C) (Oral) 18 11/21/2001 96%        Blood Pressure from Last 3 Encounters:   06/26/17 132/88   05/16/17 139/90   04/13/17 148/79    Weight from Last 3 Encounters:   02/01/17 73.5 kg (162 lb 0.6 oz)   01/11/17 73.6 kg (162 lb 3.2 oz)   01/09/17 74.4 kg (164 lb)              Today, you had the following     No orders found for display         Today's Medication Changes          These changes are accurate as of: 6/26/17 11:59 PM.  If you have any questions, ask your nurse or doctor.               These medicines have changed or have updated prescriptions.        Dose/Directions    hydrochlorothiazide 25 MG tablet   Commonly known as:  HYDRODIURIL   This may have changed:  additional instructions   Used for:  Essential hypertension, benign        Dose:  25 mg   Take 1 tablet (25 mg) by mouth daily TAKE ONE TABLET BY MOUTH ONE TIME DAILY   Quantity:  90 tablet   Refills:  3       lisinopril 20 MG tablet   Commonly known as:  PRINIVIL/ZESTRIL   This may have changed:  additional instructions   Used for:  Essential hypertension, benign        Dose:  20 mg   Take 1 tablet (20 mg) by mouth daily   Quantity:  90 tablet   Refills:  3                Primary Care Provider Office Phone # Fax #    Nikolay Baeza [phone]       [facility] [address]  [facility] [address]        Equal Access to Services     ERIK MULLINS : Hadii pamela ku hadgloryo Sobooali, waaxda luqadaha, qaybta kacintiada sreemoisés, waxjohnny alexandra almitadarshana hadley cassrobert choi. So [facility] [phone].    ATENCIÓN: Si habla español, tiene a carpio disposición servicios gratuitos de asistencia lingüística. Llame al [phone].    We comply with applicable federal civil rights laws and [place] laws. We do not discriminate on the basis of race, color, national origin, age, disability sex, sexual orientation or gender identity.            Thank you!     Thank you for choosing [facility]  for your care. Our goal is always to provide you with excellent care. Hearing back from our patients is one way we can continue to improve our services. Please take a few minutes to complete the written survey that you may receive in the mail after your visit with us. Thank you!             Your Updated Medication List - Protect others around you: Learn how to safely use, store and throw away your medicines at www.disposemymeds.org.          This list is accurate as of: 6/26/17 11:59 PM.  Always use your most recent med list.                   Brand Name Dispense Instructions for use Diagnosis    acetaminophen 325 MG tablet    TYLENOL    100 tablet    Take 2 tablets (650 mg) by mouth every 4 hours as needed for other (mild pain)    Portacath in place       * tocilizumab 80 MG/4ML    ACTEMRA     Reported on 3/16/2017        * ACTEMRA 400 MG/20ML Soln   Generic drug:  Tocilizumab      Hold until resumed by rheumatology. Previously was injecting very 28 days.        albuterol 108 (90 BASE) MCG/ACT Inhaler    albuterol    1 Inhaler    Inhale 2 puffs into the lungs every 4 hours as needed for shortness of breath / dyspnea    Mild intermittent asthma without complication       ALBUTEROL IN      Inhale 2 puffs into the lungs        B-D TB SYRINGE 27G X 1/2\" 1 ML Misc   Generic drug:  " tuberculin-allergy syringes           butalbital-APAP-caffeine-codeine -84-30 MG per capsule    FIORICET WITH codeine     Take 1 capsule by mouth every 4 hours as needed for headaches or migraine        CATHFLO ACTIVASE 2 MG injection   Generic drug:  alteplase      by Injection route ONCE.        cetirizine 10 MG tablet    zyrTEC    30 tablet    Take 4 tablets (40 mg) by mouth At Bedtime        CoQ-10 100 MG Caps      Take 1 capsule by mouth every evening        cyanocobalamin 1000 MCG/ML injection    VITAMIN B12    10 mL    Inject 1 mL (1,000 mcg) into the muscle every 30 days Inject  intramuscular every 4 weeks.  May give multi-use vial.    Vitamin B 12 deficiency       EPIPEN 2-JOSE 0.3 MG/0.3ML injection   Generic drug:  EPINEPHrine      Inject 0.3 mg into the muscle once as needed        fluticasone 50 MCG/ACT spray    FLONASE    3 Package    Spray 1-2 sprays into both nostrils daily    Allergic rhinitis       FOLIC ACID PO      Take 3 mg by mouth daily        furosemide 20 MG tablet    LASIX    30 tablet    Take 0.5-1 tablets (10-20 mg) by mouth daily as needed On long work days.    Peripheral edema       HEPARIN SODIUM (PORCINE) IJ           hydrochlorothiazide 25 MG tablet    HYDRODIURIL    90 tablet    Take 1 tablet (25 mg) by mouth daily TAKE ONE TABLET BY MOUTH ONE TIME DAILY    Essential hypertension, benign       HYDROcodone-acetaminophen 5-325 MG per tablet    NORCO    60 tablet    Take 1 tablet by mouth every 6 hours as needed for moderate to severe pain    Rheumatoid arthritis, involving unspecified site, unspecified rheumatoid factor presence (H)       hydrOXYzine 25 MG tablet    ATARAX    120 tablet    Take 1-4 tablets ( mg) by mouth every 6 hours as needed for anxiety    Situational anxiety       ipratropium - albuterol 0.5 mg/2.5 mg/3 mL 0.5-2.5 (3) MG/3ML neb solution    DUONEB    1 Box    Take 1 vial (3 mLs) by nebulization every 4 hours as needed for shortness of breath / dyspnea  or wheezing    Mild intermittent asthma without complication       lactated ringers Soln     1500 mL    Infuse 3 TIMES WEEKLY PRN during times of exacerbation of her nausea/vomiting/intractable abdominal pain.  Please page [phone] or call [phone] with questions regarding this therapy plan.  VORB per Dr. Angus Padgett, per protocol.    Dehydration       levOCARNitine 330 MG tablet    CARNITOR    90 tablet    TAKE ONE TABLET BY MOUTH THREE TIMES DAILY    Intractable cyclical vomiting with nausea       lisinopril 20 MG tablet    PRINIVIL/ZESTRIL    90 tablet    Take 1 tablet (20 mg) by mouth daily    Essential hypertension, benign       LORazepam 1 MG tablet    ATIVAN    30 tablet    Take 0.5-1 tablets (0.5-1 mg) by mouth nightly as needed for anxiety    Situational anxiety       methotrexate 50 MG/2ML injection CHEMO      Inject 0.8 mLs (20 mg) Subcutaneous once a week Hold until resumed by rheumatology. Previously receiving weekly on mondays        metoclopramide 10 MG/10ML Soln solution    REGLAN    1200 mL    Take 10 mLs (10 mg) by mouth 3 times daily (before meals)    Gastroesophageal reflux disease, esophagitis presence not specified       mometasone-formoterol 200-5 MCG/ACT oral inhaler    DULERA     Inhale 2 puffs into the lungs 2 times daily        montelukast 10 MG tablet    SINGULAIR    90 tablet    Take 1 tablet (10 mg) by mouth At Bedtime    Seasonal allergic rhinitis, Mild intermittent asthma without complication       OMALIZUMAB SC      Inject 300 mg Subcutaneous        ondansetron 2 MG/ML Soln injection    ZOFRAN    2000 mL    Inject 2 mLs (4 mg) into the vein as needed 3 TIMES WEEKLY PRN with PRN IV infusions  May repeat x 1 per individual infusion.  Please page the nurse at [phone] or call [phone] Option 4 with questions about this order.    Dehydration       ondansetron 8 MG ODT tab    ZOFRAN-ODT    90 tablet    Take 1 tablet (8 mg) by mouth every 8 hours as needed for nausea 1  tablet 3 times daily. If not covered, try solution at same dosing.    Seasonal allergic rhinitis       order for DME     1 each    Equipment being ordered: #1 pair of fitted knee high compression hose.  25-30mgHG    Peripheral edema       pantoprazole 40 MG EC tablet    PROTONIX    90 tablet    Take 1 tablet (40 mg) by mouth daily    Esophageal reflux       potassium chloride Powd      10 mEq daily        predniSONE 5 MG tablet    DELTASONE     Take 7.5 mg by mouth daily    Rheumatoid arthritis, involving unspecified site, unspecified rheumatoid factor presence (H)       prenatal multivitamin  plus iron 27-0.8 MG Tabs per tablet      Take 1 tablet by mouth daily        ranitidine 150 MG capsule    ZANTAC    60 capsule    Take 1 capsule (150 mg) by mouth At Bedtime    Gastroesophageal reflux disease without esophagitis       temazepam 15 MG capsule    RESTORIL    30 capsule    Take 1 capsule (15 mg) by mouth nightly as needed for sleep Take 1 capsule by mouth at bedtime if needed for Sleep.    Shift work sleep disorder       tiZANidine 4 MG tablet    ZANAFLEX    90 tablet    Take 1 tablet (4 mg) by mouth every 6 hours as needed for muscle spasms TAKE ONE TABLET BY MOUTH EVERY SIX HOURS AS NEEDED FOR MUSCLE SPASM    Muscle spasm       * Notice:  This list has 2 medication(s) that are the same as other medications prescribed for you. Read the directions carefully, and ask your doctor or other care provider to review them with you.

## 2017-07-17 ENCOUNTER — INFUSION THERAPY VISIT (OUTPATIENT)
Dept: INFUSION THERAPY | Facility: CLINIC | Age: 37
End: 2017-07-17
Attending: ALLERGY & IMMUNOLOGY
Payer: COMMERCIAL

## 2017-07-17 VITALS
DIASTOLIC BLOOD PRESSURE: 84 MMHG | RESPIRATION RATE: 16 BRPM | TEMPERATURE: 98.1 F | SYSTOLIC BLOOD PRESSURE: 147 MMHG | HEART RATE: 100 BPM

## 2017-07-17 DIAGNOSIS — G89.4 CHRONIC PAIN SYNDROME: ICD-10-CM

## 2017-07-17 DIAGNOSIS — K83.4 SPASM OF SPHINCTER OF ODDI: ICD-10-CM

## 2017-07-17 DIAGNOSIS — L50.8 CHRONIC URTICARIA: Primary | ICD-10-CM

## 2017-07-17 PROCEDURE — 25000128 H RX IP 250 OP 636: Performed by: EMERGENCY MEDICINE

## 2017-07-17 PROCEDURE — 96401 CHEMO ANTI-NEOPL SQ/IM: CPT

## 2017-07-17 RX ADMIN — OMALIZUMAB 300 MG: 202.5 INJECTION, SOLUTION SUBCUTANEOUS at 14:45

## 2017-07-17 NOTE — MR AVS SNAPSHOT
After Visit Summary   7/17/2017    Demetri Mosley    MRN: [id number]           Patient Information     Date Of Birth          1980        Visit Information        Provider Department      7/17/2017 2:30 PM ROOM 10 [facility]        Today's Diagnoses     Chronic urticaria    -  1    Spasm of sphincter of Oddi        Chronic pain syndrome           Follow-ups after your visit        Your next 10 appointments already scheduled     Jul 31, 2017  9:30 AM CDT   Level 2 with ROOM 1 [facility] ([facility])    [facility]  [address]  [facility] [address]   [phone]            Aug 14, 2017  1:00 PM CDT   Level 2 with ROOM 1 [facility] ([facility])    [facility]  [address]  [facility] [address]   [phone]              Who to contact     If you have questions or need follow up information about today's clinic visit or your schedule please contact [facility] directly at [phone].  Normal or non-critical lab and imaging results will be communicated to you by [employer], letter or phone within 4 business days after the clinic has received the results. If you do not hear from us within 7 days, please contact the clinic through [employer] or phone. If you have a critical or abnormal lab result, we will notify you by phone as soon as possible.  Submit refill requests through [employer] or call your pharmacy and they will forward the refill request to us. Please allow 3 business days for your refill to be completed.          Additional Information About Your Visit        [employer] Information     [employer] gives you secure access to your electronic health record. If you see a primary care provider, you can also send messages to your care team and make appointments. If you have questions, please call your primary care clinic.  If you do not have a primary  care provider, please call [phone] and they will assist you.        Care EveryWhere ID     This is your Care EveryWhere ID. This could be used by other organizations to access your [facility] medical records  [id number]        Your Vitals Were     Pulse Temperature Respirations Last Period          100 98.1  F (36.7  C) 16 11/21/2001         Blood Pressure from Last 3 Encounters:   07/17/17 147/84   06/26/17 132/88   05/16/17 139/90    Weight from Last 3 Encounters:   02/01/17 73.5 kg (162 lb 0.6 oz)   01/11/17 73.6 kg (162 lb 3.2 oz)   01/09/17 74.4 kg (164 lb)              Today, you had the following     No orders found for display         Today's Medication Changes          These changes are accurate as of: 7/17/17  3:07 PM.  If you have any questions, ask your nurse or doctor.               These medicines have changed or have updated prescriptions.        Dose/Directions    hydrochlorothiazide 25 MG tablet   Commonly known as:  HYDRODIURIL   This may have changed:  additional instructions   Used for:  Essential hypertension, benign        Dose:  25 mg   Take 1 tablet (25 mg) by mouth daily TAKE ONE TABLET BY MOUTH ONE TIME DAILY   Quantity:  90 tablet   Refills:  3       lisinopril 20 MG tablet   Commonly known as:  PRINIVIL/ZESTRIL   This may have changed:  additional instructions   Used for:  Essential hypertension, benign        Dose:  20 mg   Take 1 tablet (20 mg) by mouth daily   Quantity:  90 tablet   Refills:  3                Primary Care Provider Office Phone # Fax #    Nikolay Baeza [phone]       [address]  [facility] [address]        Equal Access to Services     [facility] AH: Hadii aad ku hadasho Soomaali, waaxda luqadaha, qaybta kaalmada adeegpalomo, david choi. So [facility] [phone].    ATENCIÓN: Si habla español, tiene a carpio disposición servicios gratuitos de asistencia lingüística. Llame al [phone].    We  "comply with applicable federal civil rights laws and [place] laws. We do not discriminate on the basis of race, color, national origin, age, disability sex, sexual orientation or gender identity.            Thank you!     Thank you for choosing [facility]  for your care. Our goal is always to provide you with excellent care. Hearing back from our patients is one way we can continue to improve our services. Please take a few minutes to complete the written survey that you may receive in the mail after your visit with us. Thank you!             Your Updated Medication List - Protect others around you: Learn how to safely use, store and throw away your medicines at www.disposemymeds.org.          This list is accurate as of: 7/17/17  3:07 PM.  Always use your most recent med list.                   Brand Name Dispense Instructions for use Diagnosis    acetaminophen 325 MG tablet    TYLENOL    100 tablet    Take 2 tablets (650 mg) by mouth every 4 hours as needed for other (mild pain)    Portacath in place       * tocilizumab 80 MG/4ML    ACTEMRA     Reported on 3/16/2017        * ACTEMRA 400 MG/20ML Soln   Generic drug:  Tocilizumab      Hold until resumed by rheumatology. Previously was injecting very 28 days.        albuterol 108 (90 BASE) MCG/ACT Inhaler    albuterol    1 Inhaler    Inhale 2 puffs into the lungs every 4 hours as needed for shortness of breath / dyspnea    Mild intermittent asthma without complication       ALBUTEROL IN      Inhale 2 puffs into the lungs        B-D TB SYRINGE 27G X 1/2\" 1 ML Misc   Generic drug:  tuberculin-allergy syringes           butalbital-APAP-caffeine-codeine -28-30 MG per capsule    FIORICET WITH codeine     Take 1 capsule by mouth every 4 hours as needed for headaches or migraine        CATHFLO ACTIVASE 2 MG injection   Generic drug:  alteplase      by Injection route ONCE.        cetirizine 10 MG tablet    zyrTEC    30 tablet    Take 4 tablets (40 mg) by " mouth At Bedtime        CoQ-10 100 MG Caps      Take 1 capsule by mouth every evening        cyanocobalamin 1000 MCG/ML injection    VITAMIN B12    10 mL    Inject 1 mL (1,000 mcg) into the muscle every 30 days Inject  intramuscular every 4 weeks.  May give multi-use vial.    Vitamin B 12 deficiency       EPIPEN 2-JOSE 0.3 MG/0.3ML injection 2-pack   Generic drug:  EPINEPHrine      Inject 0.3 mg into the muscle once as needed        fluticasone 50 MCG/ACT spray    FLONASE    3 Package    Spray 1-2 sprays into both nostrils daily    Allergic rhinitis       FOLIC ACID PO      Take 3 mg by mouth daily        furosemide 20 MG tablet    LASIX    30 tablet    Take 0.5-1 tablets (10-20 mg) by mouth daily as needed On long work days.    Peripheral edema       HEPARIN SODIUM (PORCINE) IJ           hydrochlorothiazide 25 MG tablet    HYDRODIURIL    90 tablet    Take 1 tablet (25 mg) by mouth daily TAKE ONE TABLET BY MOUTH ONE TIME DAILY    Essential hypertension, benign       HYDROcodone-acetaminophen 5-325 MG per tablet    NORCO    60 tablet    Take 1 tablet by mouth every 6 hours as needed for moderate to severe pain    Rheumatoid arthritis, involving unspecified site, unspecified rheumatoid factor presence (H)       hydrOXYzine 25 MG tablet    ATARAX    120 tablet    Take 1-4 tablets ( mg) by mouth every 6 hours as needed for anxiety    Situational anxiety       ipratropium - albuterol 0.5 mg/2.5 mg/3 mL 0.5-2.5 (3) MG/3ML neb solution    DUONEB    1 Box    Take 1 vial (3 mLs) by nebulization every 4 hours as needed for shortness of breath / dyspnea or wheezing    Mild intermittent asthma without complication       lactated ringers Soln     1500 mL    Infuse 3 TIMES WEEKLY PRN during times of exacerbation of her nausea/vomiting/intractable abdominal pain.  Please page [phone] or call [phone] with questions regarding this therapy plan.  VORB per Dr. Angus Padgett, per protocol.    Dehydration        levOCARNitine 330 MG tablet    CARNITOR    90 tablet    TAKE ONE TABLET BY MOUTH THREE TIMES DAILY    Intractable cyclical vomiting with nausea       lisinopril 20 MG tablet    PRINIVIL/ZESTRIL    90 tablet    Take 1 tablet (20 mg) by mouth daily    Essential hypertension, benign       LORazepam 1 MG tablet    ATIVAN    30 tablet    Take 0.5-1 tablets (0.5-1 mg) by mouth nightly as needed for anxiety    Situational anxiety       methotrexate 50 MG/2ML injection CHEMO      Inject 0.8 mLs (20 mg) Subcutaneous once a week Hold until resumed by rheumatology. Previously receiving weekly on mondays        metoclopramide 10 MG/10ML Soln solution    REGLAN    1200 mL    Take 10 mLs (10 mg) by mouth 3 times daily (before meals)    Gastroesophageal reflux disease, esophagitis presence not specified       mometasone-formoterol 200-5 MCG/ACT oral inhaler    DULERA     Inhale 2 puffs into the lungs 2 times daily        montelukast 10 MG tablet    SINGULAIR    90 tablet    Take 1 tablet (10 mg) by mouth At Bedtime    Seasonal allergic rhinitis, Mild intermittent asthma without complication       OMALIZUMAB SC      Inject 300 mg Subcutaneous        ondansetron 2 MG/ML Soln injection    ZOFRAN    2000 mL    Inject 2 mLs (4 mg) into the vein as needed 3 TIMES WEEKLY PRN with PRN IV infusions  May repeat x 1 per individual infusion.  Please page the nurse at [phone] or call [phone] Option 4 with questions about this order.    Dehydration       ondansetron 8 MG ODT tab    ZOFRAN-ODT    90 tablet    Take 1 tablet (8 mg) by mouth every 8 hours as needed for nausea 1 tablet 3 times daily. If not covered, try solution at same dosing.    Seasonal allergic rhinitis       order for DME     1 each    Equipment being ordered: #1 pair of fitted knee high compression hose.  25-30mgHG    Peripheral edema       pantoprazole 40 MG EC tablet    PROTONIX    90 tablet    Take 1 tablet (40 mg) by mouth daily    Esophageal reflux        potassium chloride Powd      10 mEq daily        predniSONE 5 MG tablet    DELTASONE     Take 7.5 mg by mouth daily    Rheumatoid arthritis, involving unspecified site, unspecified rheumatoid factor presence (H)       prenatal multivitamin  plus iron 27-0.8 MG Tabs per tablet      Take 1 tablet by mouth daily        ranitidine 150 MG capsule    ZANTAC    60 capsule    Take 1 capsule (150 mg) by mouth At Bedtime    Gastroesophageal reflux disease without esophagitis       temazepam 15 MG capsule    RESTORIL    30 capsule    Take 1 capsule (15 mg) by mouth nightly as needed for sleep Take 1 capsule by mouth at bedtime if needed for Sleep.    Shift work sleep disorder       tiZANidine 4 MG tablet    ZANAFLEX    90 tablet    Take 1 tablet (4 mg) by mouth every 6 hours as needed for muscle spasms TAKE ONE TABLET BY MOUTH EVERY SIX HOURS AS NEEDED FOR MUSCLE SPASM    Muscle spasm       * Notice:  This list has 2 medication(s) that are the same as other medications prescribed for you. Read the directions carefully, and ask your doctor or other care provider to review them with you.

## 2017-07-17 NOTE — PROGRESS NOTES
Infusion Nursing Note:  Demetri Mosley presents today for Xolair.    Patient seen by provider today: No   present during visit today: Not Applicable.    Note: N/A.    Intravenous Access:  NA    Treatment Conditions:  Not Applicable.      Post Infusion Assessment:  Patient tolerated injection without incident.    Discharge Plan:   Patient discharged in stable condition accompanied by: self. Scheduled to return in 2wks.    Chad Lara RN

## 2017-07-31 ENCOUNTER — INFUSION THERAPY VISIT (OUTPATIENT)
Dept: INFUSION THERAPY | Facility: CLINIC | Age: 37
End: 2017-07-31
Attending: ALLERGY & IMMUNOLOGY
Payer: COMMERCIAL

## 2017-07-31 VITALS — SYSTOLIC BLOOD PRESSURE: 143 MMHG | DIASTOLIC BLOOD PRESSURE: 93 MMHG | HEART RATE: 83 BPM | TEMPERATURE: 97.5 F

## 2017-07-31 DIAGNOSIS — K83.4 SPASM OF SPHINCTER OF ODDI: ICD-10-CM

## 2017-07-31 DIAGNOSIS — G89.4 CHRONIC PAIN SYNDROME: ICD-10-CM

## 2017-07-31 DIAGNOSIS — L50.8 CHRONIC URTICARIA: Primary | ICD-10-CM

## 2017-07-31 PROCEDURE — 25000128 H RX IP 250 OP 636: Performed by: EMERGENCY MEDICINE

## 2017-07-31 PROCEDURE — 96401 CHEMO ANTI-NEOPL SQ/IM: CPT

## 2017-07-31 RX ADMIN — OMALIZUMAB 300 MG: 202.5 INJECTION, SOLUTION SUBCUTANEOUS at 09:52

## 2017-07-31 NOTE — MR AVS SNAPSHOT
After Visit Summary   7/31/2017    Demetri Mosley    MRN: [id number]           Patient Information     Date Of Birth          1980        Visit Information        Provider Department      7/31/2017 9:30 AM ROOM 1 [facility]        Today's Diagnoses     Chronic urticaria    -  1    Spasm of sphincter of Oddi        Chronic pain syndrome           Follow-ups after your visit        Your next 10 appointments already scheduled     Aug 14, 2017  1:00 PM CDT   Level 2 with ROOM 1 [facility] ([facility])    [facility]  [address]  [facility] [address]   [phone]            Aug 28, 2017  9:00 AM CDT   Level 2 with ROOM 10 [facility] ([facility])    [facility]  [address]  [facility] [address]   [phone]              Who to contact     If you have questions or need follow up information about today's clinic visit or your schedule please contact [facility] directly at [phone].  Normal or non-critical lab and imaging results will be communicated to you by [employer], letter or phone within 4 business days after the clinic has received the results. If you do not hear from us within 7 days, please contact the clinic through [employer] or phone. If you have a critical or abnormal lab result, we will notify you by phone as soon as possible.  Submit refill requests through [employer] or call your pharmacy and they will forward the refill request to us. Please allow 3 business days for your refill to be completed.          Additional Information About Your Visit        [employer] Information     [employer] gives you secure access to your electronic health record. If you see a primary care provider, you can also send messages to your care team and make appointments. If you have questions, please call your primary care clinic.  If you do not have a primary  care provider, please call [phone] and they will assist you.        Care EveryWhere ID     This is your Care EveryWhere ID. This could be used by other organizations to access your [place] medical records  [id number]        Your Vitals Were     Pulse Temperature Last Period             83 97.5  F (36.4  C) 11/21/2001          Blood Pressure from Last 3 Encounters:   07/31/17 (!) 143/93   07/17/17 147/84   06/26/17 132/88    Weight from Last 3 Encounters:   02/01/17 73.5 kg (162 lb 0.6 oz)   01/11/17 73.6 kg (162 lb 3.2 oz)   01/09/17 74.4 kg (164 lb)              Today, you had the following     No orders found for display         Today's Medication Changes          These changes are accurate as of: 7/31/17 10:24 AM.  If you have any questions, ask your nurse or doctor.               These medicines have changed or have updated prescriptions.        Dose/Directions    hydrochlorothiazide 25 MG tablet   Commonly known as:  HYDRODIURIL   This may have changed:  additional instructions   Used for:  Essential hypertension, benign        Dose:  25 mg   Take 1 tablet (25 mg) by mouth daily TAKE ONE TABLET BY MOUTH ONE TIME DAILY   Quantity:  90 tablet   Refills:  3       lisinopril 20 MG tablet   Commonly known as:  PRINIVIL/ZESTRIL   This may have changed:  additional instructions   Used for:  Essential hypertension, benign        Dose:  20 mg   Take 1 tablet (20 mg) by mouth daily   Quantity:  90 tablet   Refills:  3                Primary Care Provider Office Phone # Fax #    Nikolay Baeza [phone]       [address] [place] [address] [place] [id number]        Equal Access to Services     [facility] AH: Hadii aad ku hadasho Soomaali, waaxda luqadaha, qaybta kaalmada adeegpalomo, david choi. So [facility] [phone].    ATENCIÓN: Si habla español, tiene a carpio disposición servicios gratuitos de asistencia lingüística. Llame al [phone].    We comply  "with applicable federal civil rights laws and [place] laws. We do not discriminate on the basis of race, color, national origin, age, disability sex, sexual orientation or gender identity.            Thank you!     Thank you for choosing [facility]  for your care. Our goal is always to provide you with excellent care. Hearing back from our patients is one way we can continue to improve our services. Please take a few minutes to complete the written survey that you may receive in the mail after your visit with us. Thank you!             Your Updated Medication List - Protect others around you: Learn how to safely use, store and throw away your medicines at www.disposemymeds.org.          This list is accurate as of: 7/31/17 10:24 AM.  Always use your most recent med list.                   Brand Name Dispense Instructions for use Diagnosis    acetaminophen 325 MG tablet    TYLENOL    100 tablet    Take 2 tablets (650 mg) by mouth every 4 hours as needed for other (mild pain)    Portacath in place       * tocilizumab 80 MG/4ML    ACTEMRA     Reported on 3/16/2017        * ACTEMRA 400 MG/20ML Soln   Generic drug:  Tocilizumab      Hold until resumed by rheumatology. Previously was injecting very 28 days.        albuterol 108 (90 BASE) MCG/ACT Inhaler    albuterol    1 Inhaler    Inhale 2 puffs into the lungs every 4 hours as needed for shortness of breath / dyspnea    Mild intermittent asthma without complication       ALBUTEROL IN      Inhale 2 puffs into the lungs        B-D TB SYRINGE 27G X 1/2\" 1 ML Misc   Generic drug:  tuberculin-allergy syringes           butalbital-APAP-caffeine-codeine -91-30 MG per capsule    FIORICET WITH codeine     Take 1 capsule by mouth every 4 hours as needed for headaches or migraine        CATHFLO ACTIVASE 2 MG injection   Generic drug:  alteplase      by Injection route ONCE.        cetirizine 10 MG tablet    zyrTEC    30 tablet    Take 4 tablets (40 mg) by mouth " At Bedtime        CoQ-10 100 MG Caps      Take 1 capsule by mouth every evening        cyanocobalamin 1000 MCG/ML injection    VITAMIN B12    10 mL    Inject 1 mL (1,000 mcg) into the muscle every 30 days Inject  intramuscular every 4 weeks.  May give multi-use vial.    Vitamin B 12 deficiency       EPIPEN 2-JOSE 0.3 MG/0.3ML injection 2-pack   Generic drug:  EPINEPHrine      Inject 0.3 mg into the muscle once as needed        fluticasone 50 MCG/ACT spray    FLONASE    3 Package    Spray 1-2 sprays into both nostrils daily    Allergic rhinitis       FOLIC ACID PO      Take 3 mg by mouth daily        furosemide 20 MG tablet    LASIX    30 tablet    Take 0.5-1 tablets (10-20 mg) by mouth daily as needed On long work days.    Peripheral edema       HEPARIN SODIUM (PORCINE) IJ           hydrochlorothiazide 25 MG tablet    HYDRODIURIL    90 tablet    Take 1 tablet (25 mg) by mouth daily TAKE ONE TABLET BY MOUTH ONE TIME DAILY    Essential hypertension, benign       HYDROcodone-acetaminophen 5-325 MG per tablet    NORCO    60 tablet    Take 1 tablet by mouth every 6 hours as needed for moderate to severe pain    Rheumatoid arthritis, involving unspecified site, unspecified rheumatoid factor presence (H)       hydrOXYzine 25 MG tablet    ATARAX    120 tablet    Take 1-4 tablets ( mg) by mouth every 6 hours as needed for anxiety    Situational anxiety       ipratropium - albuterol 0.5 mg/2.5 mg/3 mL 0.5-2.5 (3) MG/3ML neb solution    DUONEB    1 Box    Take 1 vial (3 mLs) by nebulization every 4 hours as needed for shortness of breath / dyspnea or wheezing    Mild intermittent asthma without complication       lactated ringers Soln     1500 mL    Infuse 3 TIMES WEEKLY PRN during times of exacerbation of her nausea/vomiting/intractable abdominal pain.  Please page [phone] or call [phone] with questions regarding this therapy plan.  VORB per Dr. Angus Padgett, per protocol.    Dehydration       levOCARNitine  330 MG tablet    CARNITOR    90 tablet    TAKE ONE TABLET BY MOUTH THREE TIMES DAILY    Intractable cyclical vomiting with nausea       lisinopril 20 MG tablet    PRINIVIL/ZESTRIL    90 tablet    Take 1 tablet (20 mg) by mouth daily    Essential hypertension, benign       LORazepam 1 MG tablet    ATIVAN    30 tablet    Take 0.5-1 tablets (0.5-1 mg) by mouth nightly as needed for anxiety    Situational anxiety       methotrexate 50 MG/2ML injection CHEMO      Inject 0.8 mLs (20 mg) Subcutaneous once a week Hold until resumed by rheumatology. Previously receiving weekly on mondays        metoclopramide 10 MG/10ML Soln solution    REGLAN    1200 mL    Take 10 mLs (10 mg) by mouth 3 times daily (before meals)    Gastroesophageal reflux disease, esophagitis presence not specified       mometasone-formoterol 200-5 MCG/ACT oral inhaler    DULERA     Inhale 2 puffs into the lungs 2 times daily        montelukast 10 MG tablet    SINGULAIR    90 tablet    Take 1 tablet (10 mg) by mouth At Bedtime    Seasonal allergic rhinitis, Mild intermittent asthma without complication       OMALIZUMAB SC      Inject 300 mg Subcutaneous        ondansetron 2 MG/ML Soln injection    ZOFRAN    2000 mL    Inject 2 mLs (4 mg) into the vein as needed 3 TIMES WEEKLY PRN with PRN IV infusions  May repeat x 1 per individual infusion.  Please page the nurse at [phone] or call [phone] Option 4 with questions about this order.    Dehydration       ondansetron 8 MG ODT tab    ZOFRAN-ODT    90 tablet    Take 1 tablet (8 mg) by mouth every 8 hours as needed for nausea 1 tablet 3 times daily. If not covered, try solution at same dosing.    Seasonal allergic rhinitis       order for DME     1 each    Equipment being ordered: #1 pair of fitted knee high compression hose.  25-30mgHG    Peripheral edema       pantoprazole 40 MG EC tablet    PROTONIX    90 tablet    Take 1 tablet (40 mg) by mouth daily    Esophageal reflux       potassium chloride  Powd      10 mEq daily        predniSONE 5 MG tablet    DELTASONE     Take 7.5 mg by mouth daily    Rheumatoid arthritis, involving unspecified site, unspecified rheumatoid factor presence (H)       prenatal multivitamin  plus iron 27-0.8 MG Tabs per tablet      Take 1 tablet by mouth daily        ranitidine 150 MG capsule    ZANTAC    60 capsule    Take 1 capsule (150 mg) by mouth At Bedtime    Gastroesophageal reflux disease without esophagitis       temazepam 15 MG capsule    RESTORIL    30 capsule    Take 1 capsule (15 mg) by mouth nightly as needed for sleep Take 1 capsule by mouth at bedtime if needed for Sleep.    Shift work sleep disorder       tiZANidine 4 MG tablet    ZANAFLEX    90 tablet    Take 1 tablet (4 mg) by mouth every 6 hours as needed for muscle spasms TAKE ONE TABLET BY MOUTH EVERY SIX HOURS AS NEEDED FOR MUSCLE SPASM    Muscle spasm       * Notice:  This list has 2 medication(s) that are the same as other medications prescribed for you. Read the directions carefully, and ask your doctor or other care provider to review them with you.

## 2017-07-31 NOTE — PROGRESS NOTES
Infusion Nursing Note:  Demetri Mosley presents today for Xolair injection.    Patient seen by provider today: No   present during visit today: Not Applicable.    Note: Pt offers no complaints.    Intravenous Access:  No Intravenous access/labs at this visit.    Treatment Conditions:  Not Applicable.      Post Infusion Assessment:  Patient tolerated injection without incident.  Patient observed for 15 minutes post Xolair, but she did not want to stay the recommended 60 minutes. Pt states that she has epipen and knows how to use it.  Discharge Plan:   Patient discharged in stable condition accompanied by: daughter.  Departure Mode: Ambulatory.  Return as scheduled in 2 weeks for next injection.    Dori Adhikari RN

## 2017-08-09 DIAGNOSIS — K21.9 GASTROESOPHAGEAL REFLUX DISEASE WITHOUT ESOPHAGITIS: ICD-10-CM

## 2017-08-10 ENCOUNTER — TELEPHONE (OUTPATIENT)
Dept: GASTROENTEROLOGY | Facility: CLINIC | Age: 37
End: 2017-08-10

## 2017-08-10 NOTE — TELEPHONE ENCOUNTER
----- Message from Edvin Espinoza MD sent at 8/10/2017  9:23 AM CDT -----  Hi,  That would go to PCP or Dr Padgett.    I did an EGD on this patient last year.  I have never seen her in clinic.    Thanks    ----- Message -----     From: Maria De Jesus Reed LPN     Sent: 8/10/2017   8:56 AM       To: Maria De Jesus Reed LPN, Edvin Espinoza MD    Morning. Refill request came thru for ranitidine. Would you like to refill or deter to PCP?

## 2017-08-14 ENCOUNTER — INFUSION THERAPY VISIT (OUTPATIENT)
Dept: INFUSION THERAPY | Facility: CLINIC | Age: 37
End: 2017-08-14
Attending: ALLERGY & IMMUNOLOGY
Payer: COMMERCIAL

## 2017-08-14 VITALS — HEART RATE: 91 BPM | SYSTOLIC BLOOD PRESSURE: 151 MMHG | TEMPERATURE: 98.4 F | DIASTOLIC BLOOD PRESSURE: 95 MMHG

## 2017-08-14 DIAGNOSIS — L50.8 CHRONIC URTICARIA: Primary | ICD-10-CM

## 2017-08-14 DIAGNOSIS — G89.4 CHRONIC PAIN SYNDROME: ICD-10-CM

## 2017-08-14 DIAGNOSIS — K83.4 SPASM OF SPHINCTER OF ODDI: ICD-10-CM

## 2017-08-14 PROCEDURE — 25000128 H RX IP 250 OP 636: Performed by: EMERGENCY MEDICINE

## 2017-08-14 PROCEDURE — 96401 CHEMO ANTI-NEOPL SQ/IM: CPT

## 2017-08-14 RX ADMIN — OMALIZUMAB 300 MG: 202.5 INJECTION, SOLUTION SUBCUTANEOUS at 12:39

## 2017-08-14 NOTE — MR AVS SNAPSHOT
After Visit Summary   8/14/2017    Demetri Mosley    MRN: [id number]           Patient Information     Date Of Birth          1980        Visit Information        Provider Department      8/14/2017 1:00 PM ROOM 1 [facility]        Today's Diagnoses     Chronic urticaria    -  1    Spasm of sphincter of Oddi        Chronic pain syndrome           Follow-ups after your visit        Your next 10 appointments already scheduled     Aug 28, 2017  9:00 AM CDT   Level 2 with ROOM 10 [facility] ([facility])    [facility]  [address]  [facility] [address]   [phone]            Sep 11, 2017  9:30 AM CDT   Level 2 with ROOM 10 [facility] ([facility])    [facility]  [address]  [facility] [address]   [phone]            Sep 25, 2017  9:30 AM CDT   Level 2 with ROOM 10 [facility] ([facility])    [facility]  [address]  [facility] [address]   [phone]            Oct 09, 2017  9:30 AM CDT   Level 2 with ROOM 8 [facility] ([facility])    [facility]  [address]  [facility] [address]   [phone]            Oct 23, 2017  9:30 AM CDT   Level 2 with ROOM 6 [facility] ([facility])    [facility]  [address]  [facility] [address]   [phone]              Who to contact     If you have questions or need follow up information about today's clinic visit or your schedule please contact [facility] directly at [phone].  Normal or non-critical lab and imaging results will be communicated to you by MyChart, letter or phone within 4 business days after the clinic has received the results. If you do not hear from us within 7 days, please  contact the clinic through [employer] or phone. If you have a critical or abnormal lab result, we will notify you by phone as soon as possible.  Submit refill requests through [employer] or call your pharmacy and they will forward the refill request to us. Please allow 3 business days for your refill to be completed.          Additional Information About Your Visit        [employer] Information     [employer] gives you secure access to your electronic health record. If you see a primary care provider, you can also send messages to your care team and make appointments. If you have questions, please call your primary care clinic.  If you do not have a primary care provider, please call [phone] and they will assist you.        Care EveryWhere ID     This is your Care EveryWhere ID. This could be used by other organizations to access your [facility] medical records  [id number]        Your Vitals Were     Pulse Temperature Last Period             91 98.4  F (36.9  C) (Oral) 11/21/2001          Blood Pressure from Last 3 Encounters:   08/14/17 (!) 151/95   07/31/17 (!) 143/93   07/17/17 147/84    Weight from Last 3 Encounters:   02/01/17 73.5 kg (162 lb 0.6 oz)   01/11/17 73.6 kg (162 lb 3.2 oz)   01/09/17 74.4 kg (164 lb)              Today, you had the following     No orders found for display         Today's Medication Changes          These changes are accurate as of: 8/14/17  1:17 PM.  If you have any questions, ask your nurse or doctor.               These medicines have changed or have updated prescriptions.        Dose/Directions    hydrochlorothiazide 25 MG tablet   Commonly known as:  HYDRODIURIL   This may have changed:  additional instructions   Used for:  Essential hypertension, benign        Dose:  25 mg   Take 1 tablet (25 mg) by mouth daily TAKE ONE TABLET BY MOUTH ONE TIME DAILY   Quantity:  90 tablet   Refills:  3       lisinopril 20 MG tablet   Commonly known as:  PRINIVIL/ZESTRIL   This may have changed:   additional instructions   Used for:  Essential hypertension, benign        Dose:  20 mg   Take 1 tablet (20 mg) by mouth daily   Quantity:  90 tablet   Refills:  3                Primary Care Provider Office Phone # Fax #    Nikolay Baeza [phone]       [address]  [place] [facility] [address]        Equal Access to Services     ERIK MULLINS : Hadii aad ku hadasho Soomaali, waaxda luqadaha, qaybta kaalmada adeegyada, waxay blainein hayaan adeish kheloyrobert lanirun . So [facility] [phone].    ATENCIÓN: Si habla español, tiene a carpio disposición servicios gratuitos de asistencia lingüística. Radha al [phone].    We comply with applicable federal civil rights laws and [place] laws. We do not discriminate on the basis of race, color, national origin, age, disability sex, sexual orientation or gender identity.            Thank you!     Thank you for choosing [facility]  for your care. Our goal is always to provide you with excellent care. Hearing back from our patients is one way we can continue to improve our services. Please take a few minutes to complete the written survey that you may receive in the mail after your visit with us. Thank you!             Your Updated Medication List - Protect others around you: Learn how to safely use, store and throw away your medicines at www.disposemymeds.org.          This list is accurate as of: 8/14/17  1:17 PM.  Always use your most recent med list.                   Brand Name Dispense Instructions for use Diagnosis    acetaminophen 325 MG tablet    TYLENOL    100 tablet    Take 2 tablets (650 mg) by mouth every 4 hours as needed for other (mild pain)    Portacath in place       * tocilizumab 80 MG/4ML    ACTEMRA     Reported on 3/16/2017        * ACTEMRA 400 MG/20ML Soln   Generic drug:  Tocilizumab      Hold until resumed by rheumatology. Previously was injecting very 28 days.        albuterol 108 (90 BASE) MCG/ACT Inhaler     "albuterol    1 Inhaler    Inhale 2 puffs into the lungs every 4 hours as needed for shortness of breath / dyspnea    Mild intermittent asthma without complication       ALBUTEROL IN      Inhale 2 puffs into the lungs        B-D TB SYRINGE 27G X 1/2\" 1 ML Misc   Generic drug:  tuberculin-allergy syringes           butalbital-APAP-caffeine-codeine -25-30 MG per capsule    FIORICET WITH codeine     Take 1 capsule by mouth every 4 hours as needed for headaches or migraine        CATHFLO ACTIVASE 2 MG injection   Generic drug:  alteplase      by Injection route ONCE.        cetirizine 10 MG tablet    zyrTEC    30 tablet    Take 4 tablets (40 mg) by mouth At Bedtime        CoQ-10 100 MG Caps      Take 1 capsule by mouth every evening        cyanocobalamin 1000 MCG/ML injection    VITAMIN B12    10 mL    Inject 1 mL (1,000 mcg) into the muscle every 30 days Inject  intramuscular every 4 weeks.  May give multi-use vial.    Vitamin B 12 deficiency       EPIPEN 2-JOSE 0.3 MG/0.3ML injection 2-pack   Generic drug:  EPINEPHrine      Inject 0.3 mg into the muscle once as needed        fluticasone 50 MCG/ACT spray    FLONASE    3 Package    Spray 1-2 sprays into both nostrils daily    Allergic rhinitis       FOLIC ACID PO      Take 3 mg by mouth daily        furosemide 20 MG tablet    LASIX    30 tablet    Take 0.5-1 tablets (10-20 mg) by mouth daily as needed On long work days.    Peripheral edema       HEPARIN SODIUM (PORCINE) IJ           hydrochlorothiazide 25 MG tablet    HYDRODIURIL    90 tablet    Take 1 tablet (25 mg) by mouth daily TAKE ONE TABLET BY MOUTH ONE TIME DAILY    Essential hypertension, benign       HYDROcodone-acetaminophen 5-325 MG per tablet    NORCO    60 tablet    Take 1 tablet by mouth every 6 hours as needed for moderate to severe pain    Rheumatoid arthritis, involving unspecified site, unspecified rheumatoid factor presence (H)       hydrOXYzine 25 MG tablet    ATARAX    120 tablet    Take 1-4 " tablets ( mg) by mouth every 6 hours as needed for anxiety    Situational anxiety       ipratropium - albuterol 0.5 mg/2.5 mg/3 mL 0.5-2.5 (3) MG/3ML neb solution    DUONEB    1 Box    Take 1 vial (3 mLs) by nebulization every 4 hours as needed for shortness of breath / dyspnea or wheezing    Mild intermittent asthma without complication       lactated ringers Soln     1500 mL    Infuse 3 TIMES WEEKLY PRN during times of exacerbation of her nausea/vomiting/intractable abdominal pain.  Please page [phone] or call [phone] with questions regarding this therapy plan.  VORB per Dr. Angus Padgett, per protocol.    Dehydration       levOCARNitine 330 MG tablet    CARNITOR    90 tablet    TAKE ONE TABLET BY MOUTH THREE TIMES DAILY    Intractable cyclical vomiting with nausea       lisinopril 20 MG tablet    PRINIVIL/ZESTRIL    90 tablet    Take 1 tablet (20 mg) by mouth daily    Essential hypertension, benign       LORazepam 1 MG tablet    ATIVAN    30 tablet    Take 0.5-1 tablets (0.5-1 mg) by mouth nightly as needed for anxiety    Situational anxiety       methotrexate 50 MG/2ML injection CHEMO      Inject 0.8 mLs (20 mg) Subcutaneous once a week Hold until resumed by rheumatology. Previously receiving weekly on mondays        metoclopramide 10 MG/10ML Soln solution    REGLAN    1200 mL    Take 10 mLs (10 mg) by mouth 3 times daily (before meals)    Gastroesophageal reflux disease, esophagitis presence not specified       mometasone-formoterol 200-5 MCG/ACT oral inhaler    DULERA     Inhale 2 puffs into the lungs 2 times daily        montelukast 10 MG tablet    SINGULAIR    90 tablet    Take 1 tablet (10 mg) by mouth At Bedtime    Seasonal allergic rhinitis, Mild intermittent asthma without complication       OMALIZUMAB SC      Inject 300 mg Subcutaneous        ondansetron 2 MG/ML Soln injection    ZOFRAN    2000 mL    Inject 2 mLs (4 mg) into the vein as needed 3 TIMES WEEKLY PRN with PRN IV infusions   May repeat x 1 per individual infusion.  Please page the nurse at [phone] or call [phone] Option 4 with questions about this order.    Dehydration       ondansetron 8 MG ODT tab    ZOFRAN-ODT    90 tablet    Take 1 tablet (8 mg) by mouth every 8 hours as needed for nausea 1 tablet 3 times daily. If not covered, try solution at same dosing.    Seasonal allergic rhinitis       order for DME     1 each    Equipment being ordered: #1 pair of fitted knee high compression hose.  25-30mgHG    Peripheral edema       pantoprazole 40 MG EC tablet    PROTONIX    90 tablet    Take 1 tablet (40 mg) by mouth daily    Esophageal reflux       potassium chloride Powd      10 mEq daily        predniSONE 5 MG tablet    DELTASONE     Take 7.5 mg by mouth daily    Rheumatoid arthritis, involving unspecified site, unspecified rheumatoid factor presence (H)       prenatal multivitamin plus iron 27-0.8 MG Tabs per tablet      Take 1 tablet by mouth daily        ranitidine 150 MG capsule    ZANTAC    60 capsule    Take 1 capsule (150 mg) by mouth At Bedtime    Gastroesophageal reflux disease without esophagitis       temazepam 15 MG capsule    RESTORIL    30 capsule    Take 1 capsule (15 mg) by mouth nightly as needed for sleep Take 1 capsule by mouth at bedtime if needed for Sleep.    Shift work sleep disorder       tiZANidine 4 MG tablet    ZANAFLEX    90 tablet    Take 1 tablet (4 mg) by mouth every 6 hours as needed for muscle spasms TAKE ONE TABLET BY MOUTH EVERY SIX HOURS AS NEEDED FOR MUSCLE SPASM    Muscle spasm       * Notice:  This list has 2 medication(s) that are the same as other medications prescribed for you. Read the directions carefully, and ask your doctor or other care provider to review them with you.

## 2017-08-14 NOTE — PROGRESS NOTES
Infusion Nursing Note:  Demetri Mosley presents today for SQ Xolair.   Patient seen by provider today: No   present during visit today: Not Applicable.    Note: SQ Xolair given over 1 minute x 2 injections without complications. Pt. Observed for 15 minutes without a reaction. Pt. To return 8/28/17 for Xolair.    Intravenous Access:  No Intravenous access/labs at this visit.    Treatment Conditions:  Not Applicable.      Post Infusion Assessment:  Patient tolerated injection without incident.    Discharge Plan:   Patient and/or family verbalized understanding of discharge instructions and all questions answered.    Amanda Briggs RN

## 2017-08-28 ENCOUNTER — INFUSION THERAPY VISIT (OUTPATIENT)
Dept: INFUSION THERAPY | Facility: CLINIC | Age: 37
End: 2017-08-28
Attending: ALLERGY & IMMUNOLOGY
Payer: COMMERCIAL

## 2017-08-28 VITALS — TEMPERATURE: 97.1 F | SYSTOLIC BLOOD PRESSURE: 139 MMHG | DIASTOLIC BLOOD PRESSURE: 87 MMHG | HEART RATE: 79 BPM

## 2017-08-28 DIAGNOSIS — G89.4 CHRONIC PAIN SYNDROME: ICD-10-CM

## 2017-08-28 DIAGNOSIS — K83.4 SPASM OF SPHINCTER OF ODDI: ICD-10-CM

## 2017-08-28 DIAGNOSIS — L50.8 CHRONIC URTICARIA: Primary | ICD-10-CM

## 2017-08-28 PROCEDURE — 25000128 H RX IP 250 OP 636: Performed by: EMERGENCY MEDICINE

## 2017-08-28 PROCEDURE — 96401 CHEMO ANTI-NEOPL SQ/IM: CPT

## 2017-08-28 RX ADMIN — OMALIZUMAB 300 MG: 202.5 INJECTION, SOLUTION SUBCUTANEOUS at 08:41

## 2017-08-28 NOTE — MR AVS SNAPSHOT
After Visit Summary   8/28/2017    Demetri Mosley    MRN: [id number]           Patient Information     Date Of Birth          1980        Visit Information        Provider Department      8/28/2017 9:00 AM ROOM 10 [facility]        Today's Diagnoses     Chronic urticaria    -  1    Spasm of sphincter of Oddi        Chronic pain syndrome           Follow-ups after your visit        Your next 10 appointments already scheduled     Sep 11, 2017  9:30 AM CDT   Level 2 with ROOM 10 [facility] ([facility])    [facility]  [address]  [facility] [address]   [phone]            Sep 25, 2017  9:30 AM CDT   Level 2 with ROOM 10 [facility] ([facility])    [facility]  [address]  [facility] [address]   [phone]            Oct 09, 2017  9:30 AM CDT   Level 2 with ROOM 8 [facility] ([facility])    [facility]  [address]  [facility] [address]   [phone]            Oct 23, 2017  9:30 AM CDT   Level 2 with ROOM 6 [facility] ([facility])    [facility]  [address]  [facility] [address]   [phone]              Who to contact     If you have questions or need follow up information about today's clinic visit or your schedule please contact [facility] directly at [phone].  Normal or non-critical lab and imaging results will be communicated to you by MyChart, letter or phone within 4 business days after the clinic has received the results. If you do not hear from us within 7 days, please contact the clinic through [employer] or phone. If you have a critical or abnormal lab result, we will notify you by phone as soon as possible.  Submit refill requests through [employer] or call your pharmacy and they will  forward the refill request to us. Please allow 3 business days for your refill to be completed.          Additional Information About Your Visit        [employer] Information     [employer] gives you secure access to your electronic health record. If you see a primary care provider, you can also send messages to your care team and make appointments. If you have questions, please call your primary care clinic.  If you do not have a primary care provider, please call [phone] and they will assist you.        Care EveryWhere ID     This is your Care EveryWhere ID. This could be used by other organizations to access your [place] medical records  [id number]        Your Vitals Were     Pulse Temperature Last Period             79 97.1  F (36.2  C) (Oral) 11/21/2001          Blood Pressure from Last 3 Encounters:   08/28/17 139/87   08/14/17 (!) 151/95   07/31/17 (!) 143/93    Weight from Last 3 Encounters:   02/01/17 73.5 kg (162 lb 0.6 oz)   01/11/17 73.6 kg (162 lb 3.2 oz)   01/09/17 74.4 kg (164 lb)              Today, you had the following     No orders found for display         Today's Medication Changes          These changes are accurate as of: 8/28/17  9:34 AM.  If you have any questions, ask your nurse or doctor.               These medicines have changed or have updated prescriptions.        Dose/Directions    hydrochlorothiazide 25 MG tablet   Commonly known as:  HYDRODIURIL   This may have changed:  additional instructions   Used for:  Essential hypertension, benign        Dose:  25 mg   Take 1 tablet (25 mg) by mouth daily TAKE ONE TABLET BY MOUTH ONE TIME DAILY   Quantity:  90 tablet   Refills:  3       lisinopril 20 MG tablet   Commonly known as:  PRINIVIL/ZESTRIL   This may have changed:  additional instructions   Used for:  Essential hypertension, benign        Dose:  20 mg   Take 1 tablet (20 mg) by mouth daily   Quantity:  90 tablet   Refills:  3                Primary Care Provider Office Phone # Fax  #    Nikolay PHANI Baeza [phone]       [address] [place]  [facility] [address]        Equal Access to Services     ERIK MULLINS : Hadcooper pamela nam jarvis Sopeña, wajustinda luqdav, qamonita kaalmada siomara, david mars laMarthahal choi. So [facility] [phone].    ATENCIÓN: Si habla español, tiene a carpio disposición servicios gratuitos de asistencia lingüística. Llame al [phone].    We comply with applicable federal civil rights laws and [place] laws. We do not discriminate on the basis of race, color, national origin, age, disability sex, sexual orientation or gender identity.            Thank you!     Thank you for choosing [facility]  for your care. Our goal is always to provide you with excellent care. Hearing back from our patients is one way we can continue to improve our services. Please take a few minutes to complete the written survey that you may receive in the mail after your visit with us. Thank you!             Your Updated Medication List - Protect others around you: Learn how to safely use, store and throw away your medicines at www.disposemymeds.org.          This list is accurate as of: 8/28/17  9:34 AM.  Always use your most recent med list.                   Brand Name Dispense Instructions for use Diagnosis    acetaminophen 325 MG tablet    TYLENOL    100 tablet    Take 2 tablets (650 mg) by mouth every 4 hours as needed for other (mild pain)    Portacath in place       * tocilizumab 80 MG/4ML    ACTEMRA     Reported on 3/16/2017        * ACTEMRA 400 MG/20ML Soln   Generic drug:  Tocilizumab      Hold until resumed by rheumatology. Previously was injecting very 28 days.        albuterol 108 (90 BASE) MCG/ACT Inhaler    PROAIR HFA    1 Inhaler    Inhale 2 puffs into the lungs every 4 hours as needed for shortness of breath / dyspnea    Mild intermittent asthma without complication       ALBUTEROL IN      Inhale 2 puffs into the lungs     "    B-D TB SYRINGE 27G X 1/2\" 1 ML Misc   Generic drug:  tuberculin-allergy syringes           butalbital-APAP-caffeine-codeine -58-30 MG per capsule    FIORICET WITH codeine     Take 1 capsule by mouth every 4 hours as needed for headaches or migraine        CATHFLO ACTIVASE 2 MG injection   Generic drug:  alteplase      by Injection route ONCE.        cetirizine 10 MG tablet    zyrTEC    30 tablet    Take 4 tablets (40 mg) by mouth At Bedtime        CoQ-10 100 MG Caps      Take 1 capsule by mouth every evening        cyanocobalamin 1000 MCG/ML injection    VITAMIN B12    10 mL    Inject 1 mL (1,000 mcg) into the muscle every 30 days Inject  intramuscular every 4 weeks.  May give multi-use vial.    Vitamin B 12 deficiency       EPIPEN 2-JOSE 0.3 MG/0.3ML injection 2-pack   Generic drug:  EPINEPHrine      Inject 0.3 mg into the muscle once as needed        fluticasone 50 MCG/ACT spray    FLONASE    3 Package    Spray 1-2 sprays into both nostrils daily    Allergic rhinitis       FOLIC ACID PO      Take 3 mg by mouth daily        furosemide 20 MG tablet    LASIX    30 tablet    Take 0.5-1 tablets (10-20 mg) by mouth daily as needed On long work days.    Peripheral edema       HEPARIN SODIUM (PORCINE) IJ           hydrochlorothiazide 25 MG tablet    HYDRODIURIL    90 tablet    Take 1 tablet (25 mg) by mouth daily TAKE ONE TABLET BY MOUTH ONE TIME DAILY    Essential hypertension, benign       HYDROcodone-acetaminophen 5-325 MG per tablet    NORCO    60 tablet    Take 1 tablet by mouth every 6 hours as needed for moderate to severe pain    Rheumatoid arthritis, involving unspecified site, unspecified rheumatoid factor presence (H)       hydrOXYzine 25 MG tablet    ATARAX    120 tablet    Take 1-4 tablets ( mg) by mouth every 6 hours as needed for anxiety    Situational anxiety       ipratropium - albuterol 0.5 mg/2.5 mg/3 mL 0.5-2.5 (3) MG/3ML neb solution    DUONEB    1 Box    Take 1 vial (3 mLs) by " nebulization every 4 hours as needed for shortness of breath / dyspnea or wheezing    Mild intermittent asthma without complication       lactated ringers Soln     1500 mL    Infuse 3 TIMES WEEKLY PRN during times of exacerbation of her nausea/vomiting/intractable abdominal pain.  Please page [phone] or call [phone] with questions regarding this therapy plan.  VORB per Dr. Angus Padgett, per protocol.    Dehydration       levOCARNitine 330 MG tablet    CARNITOR    90 tablet    TAKE ONE TABLET BY MOUTH THREE TIMES DAILY    Intractable cyclical vomiting with nausea       lisinopril 20 MG tablet    PRINIVIL/ZESTRIL    90 tablet    Take 1 tablet (20 mg) by mouth daily    Essential hypertension, benign       LORazepam 1 MG tablet    ATIVAN    30 tablet    Take 0.5-1 tablets (0.5-1 mg) by mouth nightly as needed for anxiety    Situational anxiety       methotrexate 50 MG/2ML injection CHEMO      Inject 0.8 mLs (20 mg) Subcutaneous once a week Hold until resumed by rheumatology. Previously receiving weekly on mondays        metoclopramide 10 MG/10ML Soln solution    REGLAN    1200 mL    Take 10 mLs (10 mg) by mouth 3 times daily (before meals)    Gastroesophageal reflux disease, esophagitis presence not specified       mometasone-formoterol 200-5 MCG/ACT oral inhaler    DULERA     Inhale 2 puffs into the lungs 2 times daily        montelukast 10 MG tablet    SINGULAIR    90 tablet    Take 1 tablet (10 mg) by mouth At Bedtime    Seasonal allergic rhinitis, Mild intermittent asthma without complication       OMALIZUMAB SC      Inject 300 mg Subcutaneous        ondansetron 2 MG/ML Soln injection    ZOFRAN    2000 mL    Inject 2 mLs (4 mg) into the vein as needed 3 TIMES WEEKLY PRN with PRN IV infusions  May repeat x 1 per individual infusion.  Please page the nurse at [phone] or call [phone] Option 4 with questions about this order.    Dehydration       ondansetron 8 MG ODT tab    ZOFRAN-ODT    90 tablet     Take 1 tablet (8 mg) by mouth every 8 hours as needed for nausea 1 tablet 3 times daily. If not covered, try solution at same dosing.    Seasonal allergic rhinitis       order for DME     1 each    Equipment being ordered: #1 pair of fitted knee high compression hose.  25-30mgHG    Peripheral edema       pantoprazole 40 MG EC tablet    PROTONIX    90 tablet    Take 1 tablet (40 mg) by mouth daily    Esophageal reflux       potassium chloride Powd      10 mEq daily        predniSONE 5 MG tablet    DELTASONE     Take 7.5 mg by mouth daily    Rheumatoid arthritis, involving unspecified site, unspecified rheumatoid factor presence (H)       prenatal multivitamin plus iron 27-0.8 MG Tabs per tablet      Take 1 tablet by mouth daily        ranitidine 150 MG capsule    ZANTAC    60 capsule    Take 1 capsule (150 mg) by mouth At Bedtime    Gastroesophageal reflux disease without esophagitis       temazepam 15 MG capsule    RESTORIL    30 capsule    Take 1 capsule (15 mg) by mouth nightly as needed for sleep Take 1 capsule by mouth at bedtime if needed for Sleep.    Shift work sleep disorder       tiZANidine 4 MG tablet    ZANAFLEX    90 tablet    Take 1 tablet (4 mg) by mouth every 6 hours as needed for muscle spasms TAKE ONE TABLET BY MOUTH EVERY SIX HOURS AS NEEDED FOR MUSCLE SPASM    Muscle spasm       * Notice:  This list has 2 medication(s) that are the same as other medications prescribed for you. Read the directions carefully, and ask your doctor or other care provider to review them with you.

## 2017-08-28 NOTE — PROGRESS NOTES
Infusion Nursing Note:  Demetri BOWLES Melany presents today for Sq Xolair.   Patient seen by provider today: No   present during visit today: Not Applicable.    Note:  Sq Xolair given x 2 injections over 1 minute each without complications. Pt. Observed for 10 minutes after her injection without complications. Pt. To return on 9/11/17 for another dose.      Intravenous Access:  No Intravenous access/labs at this visit.    Treatment Conditions:  Not Applicable.      Post Infusion Assessment:  Patient tolerated injection without incident.    Discharge Plan:   Patient and/or family verbalized understanding of discharge instructions and all questions answered.  Patient discharged in stable condition accompanied by: self.  Departure Mode: Ambulatory.    Amanda Briggs RN

## 2017-09-11 ENCOUNTER — INFUSION THERAPY VISIT (OUTPATIENT)
Dept: INFUSION THERAPY | Facility: CLINIC | Age: 37
End: 2017-09-11
Attending: ALLERGY & IMMUNOLOGY
Payer: COMMERCIAL

## 2017-09-11 VITALS — SYSTOLIC BLOOD PRESSURE: 134 MMHG | RESPIRATION RATE: 16 BRPM | DIASTOLIC BLOOD PRESSURE: 96 MMHG | TEMPERATURE: 98.3 F

## 2017-09-11 DIAGNOSIS — G89.4 CHRONIC PAIN SYNDROME: ICD-10-CM

## 2017-09-11 DIAGNOSIS — K83.4 SPASM OF SPHINCTER OF ODDI: ICD-10-CM

## 2017-09-11 DIAGNOSIS — L50.8 CHRONIC URTICARIA: Primary | ICD-10-CM

## 2017-09-11 PROCEDURE — 96401 CHEMO ANTI-NEOPL SQ/IM: CPT

## 2017-09-11 PROCEDURE — 25000128 H RX IP 250 OP 636: Performed by: EMERGENCY MEDICINE

## 2017-09-11 RX ADMIN — OMALIZUMAB 300 MG: 202.5 INJECTION, SOLUTION SUBCUTANEOUS at 08:39

## 2017-09-11 NOTE — PROGRESS NOTES
Infusion Nursing Note:  Demetri Streetallee presents today for Xolair.    Patient seen by provider today: No   present during visit today: Not Applicable.    Note: N/A.    Intravenous Access:  NA    Treatment Conditions:  Not Applicable.      Post Infusion Assessment:  Patient tolerated injection without incident.    Discharge Plan:   Patient discharged in stable condition accompanied by: self. 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 Scheduled to return here in 2wks.    Chad Lara RN

## 2017-09-11 NOTE — MR AVS SNAPSHOT
After Visit Summary   9/11/2017    Demetri Mosley    MRN: [id number]           Patient Information     Date Of Birth          1980        Visit Information        Provider Department      9/11/2017 9:30 AM ROOM 10 [facility]        Today's Diagnoses     Chronic urticaria    -  1    Spasm of sphincter of Oddi        Chronic pain syndrome           Follow-ups after your visit        Your next 10 appointments already scheduled     Sep 11, 2017  9:30 AM CDT   Level 2 with ROOM 10 [facility] ([facility])    [facility]  [address]  [facility] [address]   [phone]            Sep 25, 2017  9:30 AM CDT   Level 2 with ROOM 10 [facility] ([facility])    [facility]  [address]  [facility] [address]   [phone]            Oct 09, 2017  9:30 AM CDT   Level 2 with ROOM 8 [facility] ([facility])    [facility]  [address]  [facility] [address]   [phone]            Oct 23, 2017  9:30 AM CDT   Level 2 with ROOM 6 [facility] ([facility])    [facility]  [address]  [facility] [address]   [phone]              Who to contact     If you have questions or need follow up information about today's clinic visit or your schedule please contact [facility] directly at [phone].  Normal or non-critical lab and imaging results will be communicated to you by MyChart, letter or phone within 4 business days after the clinic has received the results. If you do not hear from us within 7 days, please contact the clinic through [employer] or phone. If you have a critical or abnormal lab result, we will notify you by phone as soon as possible.  Submit refill requests through [employer] or call your pharmacy and they will  forward the refill request to us. Please allow 3 business days for your refill to be completed.          Additional Information About Your Visit        [employer] Information     THE [employer] App gives you secure access to your electronic health record. If you see a primary care provider, you can also send messages to your care team and make appointments. If you have questions, please call your primary care clinic.  If you do not have a primary care provider, please call [phone] and they will assist you.        Care EveryWhere ID     This is your Care EveryWhere ID. This could be used by other organizations to access your [place] medical records  [id number]        Your Vitals Were     Temperature Respirations Last Period             98.3  F (36.8  C) 16 11/21/2001          Blood Pressure from Last 3 Encounters:   09/11/17 (!) 134/96   08/28/17 139/87   08/14/17 (!) 151/95    Weight from Last 3 Encounters:   02/01/17 73.5 kg (162 lb 0.6 oz)   01/11/17 73.6 kg (162 lb 3.2 oz)   01/09/17 74.4 kg (164 lb)              Today, you had the following     No orders found for display         Today's Medication Changes          These changes are accurate as of: 9/11/17  8:47 AM.  If you have any questions, ask your nurse or doctor.               These medicines have changed or have updated prescriptions.        Dose/Directions    hydrochlorothiazide 25 MG tablet   Commonly known as:  HYDRODIURIL   This may have changed:  additional instructions   Used for:  Essential hypertension, benign        Dose:  25 mg   Take 1 tablet (25 mg) by mouth daily TAKE ONE TABLET BY MOUTH ONE TIME DAILY   Quantity:  90 tablet   Refills:  3       lisinopril 20 MG tablet   Commonly known as:  PRINIVIL/ZESTRIL   This may have changed:  additional instructions   Used for:  Essential hypertension, benign        Dose:  20 mg   Take 1 tablet (20 mg) by mouth daily   Quantity:  90 tablet   Refills:  3                Primary Care Provider Office Phone # Fax  #    Nikolay PHANI Baeza [phone]       [address]  [facility] [address]        Equal Access to Services     ERIK MULLINS : Hadcooper pamela nam jarvis Sopeña, wajustinda luqdav, qamonita kaalmada siomara, david mars laMarthahal choi. So [facility] [phone].    ATENCIÓN: Si habla español, tiene a carpio disposición servicios gratuitos de asistencia lingüística. Llame al [phone].    We comply with applicable federal civil rights laws and [place] laws. We do not discriminate on the basis of race, color, national origin, age, disability sex, sexual orientation or gender identity.            Thank you!     Thank you for choosing [facility]  for your care. Our goal is always to provide you with excellent care. Hearing back from our patients is one way we can continue to improve our services. Please take a few minutes to complete the written survey that you may receive in the mail after your visit with us. Thank you!             Your Updated Medication List - Protect others around you: Learn how to safely use, store and throw away your medicines at www.disposemymeds.org.          This list is accurate as of: 9/11/17  8:47 AM.  Always use your most recent med list.                   Brand Name Dispense Instructions for use Diagnosis    acetaminophen 325 MG tablet    TYLENOL    100 tablet    Take 2 tablets (650 mg) by mouth every 4 hours as needed for other (mild pain)    Portacath in place       * tocilizumab 80 MG/4ML    ACTEMRA     Reported on 3/16/2017        * ACTEMRA 400 MG/20ML Soln   Generic drug:  Tocilizumab      Hold until resumed by rheumatology. Previously was injecting very 28 days.        albuterol 108 (90 BASE) MCG/ACT Inhaler    PROAIR HFA    1 Inhaler    Inhale 2 puffs into the lungs every 4 hours as needed for shortness of breath / dyspnea    Mild intermittent asthma without complication       ALBUTEROL IN      Inhale 2 puffs into the lungs     "    B-D TB SYRINGE 27G X 1/2\" 1 ML Misc   Generic drug:  tuberculin-allergy syringes           butalbital-APAP-caffeine-codeine -57-30 MG per capsule    FIORICET WITH codeine     Take 1 capsule by mouth every 4 hours as needed for headaches or migraine        CATHFLO ACTIVASE 2 MG injection   Generic drug:  alteplase      by Injection route ONCE.        cetirizine 10 MG tablet    zyrTEC    30 tablet    Take 4 tablets (40 mg) by mouth At Bedtime        CoQ-10 100 MG Caps      Take 1 capsule by mouth every evening        cyanocobalamin 1000 MCG/ML injection    VITAMIN B12    10 mL    Inject 1 mL (1,000 mcg) into the muscle every 30 days Inject  intramuscular every 4 weeks.  May give multi-use vial.    Vitamin B 12 deficiency       EPIPEN 2-JOSE 0.3 MG/0.3ML injection 2-pack   Generic drug:  EPINEPHrine      Inject 0.3 mg into the muscle once as needed        fluticasone 50 MCG/ACT spray    FLONASE    3 Package    Spray 1-2 sprays into both nostrils daily    Allergic rhinitis       FOLIC ACID PO      Take 3 mg by mouth daily        furosemide 20 MG tablet    LASIX    30 tablet    Take 0.5-1 tablets (10-20 mg) by mouth daily as needed On long work days.    Peripheral edema       HEPARIN SODIUM (PORCINE) IJ           hydrochlorothiazide 25 MG tablet    HYDRODIURIL    90 tablet    Take 1 tablet (25 mg) by mouth daily TAKE ONE TABLET BY MOUTH ONE TIME DAILY    Essential hypertension, benign       HYDROcodone-acetaminophen 5-325 MG per tablet    NORCO    60 tablet    Take 1 tablet by mouth every 6 hours as needed for moderate to severe pain    Rheumatoid arthritis, involving unspecified site, unspecified rheumatoid factor presence (H)       hydrOXYzine 25 MG tablet    ATARAX    120 tablet    Take 1-4 tablets ( mg) by mouth every 6 hours as needed for anxiety    Situational anxiety       ipratropium - albuterol 0.5 mg/2.5 mg/3 mL 0.5-2.5 (3) MG/3ML neb solution    DUONEB    1 Box    Take 1 vial (3 mLs) by " nebulization every 4 hours as needed for shortness of breath / dyspnea or wheezing    Mild intermittent asthma without complication       lactated ringers Soln     1500 mL    Infuse 3 TIMES WEEKLY PRN during times of exacerbation of her nausea/vomiting/intractable abdominal pain.  Please page [phone] or call [phone] with questions regarding this therapy plan.  VORB per Dr. Angus Padgett, per protocol.    Dehydration       levOCARNitine 330 MG tablet    CARNITOR    90 tablet    TAKE ONE TABLET BY MOUTH THREE TIMES DAILY    Intractable cyclical vomiting with nausea       lisinopril 20 MG tablet    PRINIVIL/ZESTRIL    90 tablet    Take 1 tablet (20 mg) by mouth daily    Essential hypertension, benign       LORazepam 1 MG tablet    ATIVAN    30 tablet    Take 0.5-1 tablets (0.5-1 mg) by mouth nightly as needed for anxiety    Situational anxiety       methotrexate 50 MG/2ML injection CHEMO      Inject 0.8 mLs (20 mg) Subcutaneous once a week Hold until resumed by rheumatology. Previously receiving weekly on mondays        metoclopramide 10 MG/10ML Soln solution    REGLAN    1200 mL    Take 10 mLs (10 mg) by mouth 3 times daily (before meals)    Gastroesophageal reflux disease, esophagitis presence not specified       mometasone-formoterol 200-5 MCG/ACT oral inhaler    DULERA     Inhale 2 puffs into the lungs 2 times daily        montelukast 10 MG tablet    SINGULAIR    90 tablet    Take 1 tablet (10 mg) by mouth At Bedtime    Seasonal allergic rhinitis, Mild intermittent asthma without complication       OMALIZUMAB SC      Inject 300 mg Subcutaneous        ondansetron 2 MG/ML Soln injection    ZOFRAN    2000 mL    Inject 2 mLs (4 mg) into the vein as needed 3 TIMES WEEKLY PRN with PRN IV infusions  May repeat x 1 per individual infusion.  Please page the nurse at [phone] or call [phone] Option 4 with questions about this order.    Dehydration       ondansetron 8 MG ODT tab    ZOFRAN-ODT    90 tablet     Take 1 tablet (8 mg) by mouth every 8 hours as needed for nausea 1 tablet 3 times daily. If not covered, try solution at same dosing.    Seasonal allergic rhinitis       order for DME     1 each    Equipment being ordered: #1 pair of fitted knee high compression hose.  25-30mgHG    Peripheral edema       pantoprazole 40 MG EC tablet    PROTONIX    90 tablet    Take 1 tablet (40 mg) by mouth daily    Esophageal reflux       potassium chloride Powd      10 mEq daily        predniSONE 5 MG tablet    DELTASONE     Take 7.5 mg by mouth daily    Rheumatoid arthritis, involving unspecified site, unspecified rheumatoid factor presence (H)       prenatal multivitamin plus iron 27-0.8 MG Tabs per tablet      Take 1 tablet by mouth daily        ranitidine 150 MG capsule    ZANTAC    60 capsule    Take 1 capsule (150 mg) by mouth At Bedtime    Gastroesophageal reflux disease without esophagitis       temazepam 15 MG capsule    RESTORIL    30 capsule    Take 1 capsule (15 mg) by mouth nightly as needed for sleep Take 1 capsule by mouth at bedtime if needed for Sleep.    Shift work sleep disorder       tiZANidine 4 MG tablet    ZANAFLEX    90 tablet    Take 1 tablet (4 mg) by mouth every 6 hours as needed for muscle spasms TAKE ONE TABLET BY MOUTH EVERY SIX HOURS AS NEEDED FOR MUSCLE SPASM    Muscle spasm       * Notice:  This list has 2 medication(s) that are the same as other medications prescribed for you. Read the directions carefully, and ask your doctor or other care provider to review them with you.

## 2017-09-25 ENCOUNTER — INFUSION THERAPY VISIT (OUTPATIENT)
Dept: INFUSION THERAPY | Facility: CLINIC | Age: 37
End: 2017-09-25
Attending: ALLERGY & IMMUNOLOGY
Payer: COMMERCIAL

## 2017-09-25 VITALS — HEART RATE: 59 BPM | DIASTOLIC BLOOD PRESSURE: 94 MMHG | SYSTOLIC BLOOD PRESSURE: 134 MMHG

## 2017-09-25 DIAGNOSIS — I10 ESSENTIAL HYPERTENSION, BENIGN: ICD-10-CM

## 2017-09-25 DIAGNOSIS — L50.8 CHRONIC URTICARIA: Primary | ICD-10-CM

## 2017-09-25 DIAGNOSIS — K83.4 SPASM OF SPHINCTER OF ODDI: ICD-10-CM

## 2017-09-25 DIAGNOSIS — G89.4 CHRONIC PAIN SYNDROME: ICD-10-CM

## 2017-09-25 PROCEDURE — 25000128 H RX IP 250 OP 636: Performed by: EMERGENCY MEDICINE

## 2017-09-25 PROCEDURE — 96401 CHEMO ANTI-NEOPL SQ/IM: CPT

## 2017-09-25 RX ADMIN — OMALIZUMAB 300 MG: 202.5 INJECTION, SOLUTION SUBCUTANEOUS at 08:48

## 2017-09-25 NOTE — MR AVS SNAPSHOT
After Visit Summary   9/25/2017    Demetri Mosley    MRN: [id number]           Patient Information     Date Of Birth          1980        Visit Information        Provider Department      9/25/2017 9:30 AM ROOM 10 [facility]        Today's Diagnoses     Chronic urticaria    -  1    Spasm of sphincter of Oddi        Chronic pain syndrome        Essential hypertension, benign           Follow-ups after your visit        Your next 10 appointments already scheduled     Oct 09, 2017  9:30 AM CDT   Level 2 with ROOM 8 [facility] ([facility])    [facility]  [address]  [facility] [address]   [phone]            Oct 23, 2017  9:30 AM CDT   Level 2 with ROOM 6 [facility] ([facility])    [facility]  [address]  [facility] [address]   [phone]            Nov 06, 2017  9:00 AM CST   Level 2 with ROOM 7 [facility] ([facility])    [facility]  [address]  [facility] [address]   [phone]            Nov 20, 2017  9:00 AM CST   Level 2 with ROOM 8 [facility] ([facility])    [facility]  [address]  [facility] [address]   [phone]            Dec 04, 2017  9:00 AM CST   Level 2 with ROOM 8 [facility] ([facility])    [facility]  [address]  [facility] [address]   [phone]              Who to contact     If you have questions or need follow up information about today's clinic visit or your schedule please contact [facility] directly at [phone].  Normal or non-critical lab and imaging results will be communicated to you by MyChart, letter or phone within 4 business days after the clinic has received the results. If you do not hear  from us within 7 days, please contact the clinic through [employer] or phone. If you have a critical or abnormal lab result, we will notify you by phone as soon as possible.  Submit refill requests through [employer] or call your pharmacy and they will forward the refill request to us. Please allow 3 business days for your refill to be completed.          Additional Information About Your Visit        [employer] Information     [employer] gives you secure access to your electronic health record. If you see a primary care provider, you can also send messages to your care team and make appointments. If you have questions, please call your primary care clinic.  If you do not have a primary care provider, please call [phone] and they will assist you.        Care EveryWhere ID     This is your Care EveryWhere ID. This could be used by other organizations to access your [place] medical records  [id number]        Your Vitals Were     Pulse Last Period                59 11/21/2001           Blood Pressure from Last 3 Encounters:   09/25/17 (!) 134/94   09/11/17 (!) 134/96   08/28/17 139/87    Weight from Last 3 Encounters:   02/01/17 73.5 kg (162 lb 0.6 oz)   01/11/17 73.6 kg (162 lb 3.2 oz)   01/09/17 74.4 kg (164 lb)              Today, you had the following     No orders found for display         Today's Medication Changes          These changes are accurate as of: 9/25/17 10:19 AM.  If you have any questions, ask your nurse or doctor.               These medicines have changed or have updated prescriptions.        Dose/Directions    hydrochlorothiazide 25 MG tablet   Commonly known as:  HYDRODIURIL   This may have changed:  additional instructions   Used for:  Essential hypertension, benign        Dose:  25 mg   Take 1 tablet (25 mg) by mouth daily TAKE ONE TABLET BY MOUTH ONE TIME DAILY   Quantity:  90 tablet   Refills:  3       lisinopril 20 MG tablet   Commonly known as:  PRINIVIL/ZESTRIL   This may have changed:    -  how much to take  - additional instructions   Used for:  Essential hypertension, benign        Dose:  20 mg   Take 1 tablet (20 mg) by mouth daily   Quantity:  90 tablet   Refills:  3                Primary Care Provider Office Phone # Fax #    Nikolay Baeza [phone]       [address]  [place] [address]        Equal Access to Services     [facility]: Hadii aad ku hadasho Soomaali, waaxda luqadaha, qaybta kaalmada adeegyada, waxay idiin hayaan adeeg kheloysh laMarthaaan . So [facility] [phone].    ATENCIÓN: Si habla español, tiene a carpio disposición servicios gratuitos de asistencia lingüística. Llame al [phone].    We comply with applicable federal civil rights laws and [place] laws. We do not discriminate on the basis of race, color, national origin, age, disability sex, sexual orientation or gender identity.            Thank you!     Thank you for choosing [facility]  for your care. Our goal is always to provide you with excellent care. Hearing back from our patients is one way we can continue to improve our services. Please take a few minutes to complete the written survey that you may receive in the mail after your visit with us. Thank you!             Your Updated Medication List - Protect others around you: Learn how to safely use, store and throw away your medicines at www.disposemymeds.org.          This list is accurate as of: 9/25/17 10:19 AM.  Always use your most recent med list.                   Brand Name Dispense Instructions for use Diagnosis    acetaminophen 325 MG tablet    TYLENOL    100 tablet    Take 2 tablets (650 mg) by mouth every 4 hours as needed for other (mild pain)    Portacath in place       * tocilizumab 80 MG/4ML    ACTEMRA     Reported on 3/16/2017        * ACTEMRA 400 MG/20ML Soln   Generic drug:  Tocilizumab      Hold until resumed by rheumatology. Previously was injecting very 28 days.        albuterol 108 (90 BASE)  "MCG/ACT Inhaler    PROAIR HFA    1 Inhaler    Inhale 2 puffs into the lungs every 4 hours as needed for shortness of breath / dyspnea    Mild intermittent asthma without complication       ALBUTEROL IN      Inhale 2 puffs into the lungs        B-D TB SYRINGE 27G X 1/2\" 1 ML Misc   Generic drug:  tuberculin-allergy syringes           butalbital-APAP-caffeine-codeine -56-30 MG per capsule    FIORICET WITH codeine     Take 1 capsule by mouth every 4 hours as needed for headaches or migraine        CATHFLO ACTIVASE 2 MG injection   Generic drug:  alteplase      by Injection route ONCE.        cetirizine 10 MG tablet    zyrTEC    30 tablet    Take 4 tablets (40 mg) by mouth At Bedtime        CoQ-10 100 MG Caps      Take 1 capsule by mouth every evening        cyanocobalamin 1000 MCG/ML injection    VITAMIN B12    10 mL    Inject 1 mL (1,000 mcg) into the muscle every 30 days Inject  intramuscular every 4 weeks.  May give multi-use vial.    Vitamin B 12 deficiency       EPIPEN 2-JOSE 0.3 MG/0.3ML injection 2-pack   Generic drug:  EPINEPHrine      Inject 0.3 mg into the muscle once as needed        fluticasone 50 MCG/ACT spray    FLONASE    3 Package    Spray 1-2 sprays into both nostrils daily    Allergic rhinitis       FOLIC ACID PO      Take 3 mg by mouth daily        furosemide 20 MG tablet    LASIX    30 tablet    Take 0.5-1 tablets (10-20 mg) by mouth daily as needed On long work days.    Peripheral edema       HEPARIN SODIUM (PORCINE) IJ           hydrochlorothiazide 25 MG tablet    HYDRODIURIL    90 tablet    Take 1 tablet (25 mg) by mouth daily TAKE ONE TABLET BY MOUTH ONE TIME DAILY    Essential hypertension, benign       HYDROcodone-acetaminophen 5-325 MG per tablet    NORCO    60 tablet    Take 1 tablet by mouth every 6 hours as needed for moderate to severe pain    Rheumatoid arthritis, involving unspecified site, unspecified rheumatoid factor presence (H)       hydrOXYzine 25 MG tablet    ATARAX    120 " tablet    Take 1-4 tablets ( mg) by mouth every 6 hours as needed for anxiety    Situational anxiety       ipratropium - albuterol 0.5 mg/2.5 mg/3 mL 0.5-2.5 (3) MG/3ML neb solution    DUONEB    1 Box    Take 1 vial (3 mLs) by nebulization every 4 hours as needed for shortness of breath / dyspnea or wheezing    Mild intermittent asthma without complication       lactated ringers Soln     1500 mL    Infuse 3 TIMES WEEKLY PRN during times of exacerbation of her nausea/vomiting/intractable abdominal pain.  Please page [phone] or call [phone] with questions regarding this therapy plan.  VORB per Dr. Angus Padgett, per protocol.    Dehydration       levOCARNitine 330 MG tablet    CARNITOR    90 tablet    TAKE ONE TABLET BY MOUTH THREE TIMES DAILY    Intractable cyclical vomiting with nausea       lisinopril 20 MG tablet    PRINIVIL/ZESTRIL    90 tablet    Take 1 tablet (20 mg) by mouth daily    Essential hypertension, benign       LORazepam 1 MG tablet    ATIVAN    30 tablet    Take 0.5-1 tablets (0.5-1 mg) by mouth nightly as needed for anxiety    Situational anxiety       methotrexate 50 MG/2ML injection CHEMO      Inject 0.8 mLs (20 mg) Subcutaneous once a week Hold until resumed by rheumatology. Previously receiving weekly on mondays        metoclopramide 10 MG/10ML Soln solution    REGLAN    1200 mL    Take 10 mLs (10 mg) by mouth 3 times daily (before meals)    Gastroesophageal reflux disease, esophagitis presence not specified       mometasone-formoterol 200-5 MCG/ACT oral inhaler    DULERA     Inhale 2 puffs into the lungs 2 times daily        montelukast 10 MG tablet    SINGULAIR    90 tablet    Take 1 tablet (10 mg) by mouth At Bedtime    Seasonal allergic rhinitis, Mild intermittent asthma without complication       OMALIZUMAB SC      Inject 300 mg Subcutaneous        ondansetron 2 MG/ML Soln injection    ZOFRAN    2000 mL    Inject 2 mLs (4 mg) into the vein as needed 3 TIMES WEEKLY PRN with  PRN IV infusions  May repeat x 1 per individual infusion.  Please page the nurse at [phone] or call [phone] Option 4 with questions about this order.    Dehydration       ondansetron 8 MG ODT tab    ZOFRAN-ODT    90 tablet    Take 1 tablet (8 mg) by mouth every 8 hours as needed for nausea 1 tablet 3 times daily. If not covered, try solution at same dosing.    Seasonal allergic rhinitis       order for DME     1 each    Equipment being ordered: #1 pair of fitted knee high compression hose.  25-30mgHG    Peripheral edema       pantoprazole 40 MG EC tablet    PROTONIX    90 tablet    Take 1 tablet (40 mg) by mouth daily    Esophageal reflux       potassium chloride Powd      10 mEq daily        predniSONE 5 MG tablet    DELTASONE     Take 7.5 mg by mouth daily    Rheumatoid arthritis, involving unspecified site, unspecified rheumatoid factor presence (H)       prenatal multivitamin plus iron 27-0.8 MG Tabs per tablet      Take 1 tablet by mouth daily        ranitidine 150 MG capsule    ZANTAC    60 capsule    Take 1 capsule (150 mg) by mouth At Bedtime    Gastroesophageal reflux disease without esophagitis       temazepam 15 MG capsule    RESTORIL    30 capsule    Take 1 capsule (15 mg) by mouth nightly as needed for sleep Take 1 capsule by mouth at bedtime if needed for Sleep.    Shift work sleep disorder       tiZANidine 4 MG tablet    ZANAFLEX    90 tablet    Take 1 tablet (4 mg) by mouth every 6 hours as needed for muscle spasms TAKE ONE TABLET BY MOUTH EVERY SIX HOURS AS NEEDED FOR MUSCLE SPASM    Muscle spasm       * Notice:  This list has 2 medication(s) that are the same as other medications prescribed for you. Read the directions carefully, and ask your doctor or other care provider to review them with you.

## 2017-09-25 NOTE — PROGRESS NOTES
Infusion Nursing Note:  Demetri Mosley presents today for Xolair injection.    Patient seen by provider today: No   present during visit today: Not Applicable.    Note: N/A.    Intravenous Access:  No Intravenous access/labs at this visit.    Treatment Conditions:  Not Applicable.      Post Infusion Assessment:  Patient tolerated injection without incident.  Observed for 20 minutes after injection of Xolair.  Pt has epi pen and verbalizes understanding of how and when to use it.     Discharge Plan:   Patient discharged in stable condition accompanied by: self.  Departure Mode: Ambulatory.    Dori Adhikari RN

## 2017-10-09 ENCOUNTER — INFUSION THERAPY VISIT (OUTPATIENT)
Dept: INFUSION THERAPY | Facility: CLINIC | Age: 37
End: 2017-10-09
Attending: ALLERGY & IMMUNOLOGY
Payer: COMMERCIAL

## 2017-10-09 VITALS
SYSTOLIC BLOOD PRESSURE: 143 MMHG | TEMPERATURE: 98.7 F | RESPIRATION RATE: 16 BRPM | DIASTOLIC BLOOD PRESSURE: 82 MMHG | HEART RATE: 86 BPM

## 2017-10-09 DIAGNOSIS — L50.8 CHRONIC URTICARIA: Primary | ICD-10-CM

## 2017-10-09 DIAGNOSIS — K83.4 SPASM OF SPHINCTER OF ODDI: ICD-10-CM

## 2017-10-09 DIAGNOSIS — G89.4 CHRONIC PAIN SYNDROME: ICD-10-CM

## 2017-10-09 PROCEDURE — 96372 THER/PROPH/DIAG INJ SC/IM: CPT

## 2017-10-09 PROCEDURE — 25000128 H RX IP 250 OP 636: Performed by: EMERGENCY MEDICINE

## 2017-10-09 RX ADMIN — OMALIZUMAB 300 MG: 202.5 INJECTION, SOLUTION SUBCUTANEOUS at 08:40

## 2017-10-09 NOTE — PROGRESS NOTES
Infusion Nursing Note:  Demetri Mosley presents today for Xolair.    Patient seen by provider today: No   present during visit today: Not Applicable.    Note: N/A.    Intravenous Access:  NA    Treatment Conditions:  NA      Post Infusion Assessment:  Patient tolerated injection without incident.    Discharge Plan:   Patient discharged in stable condition accompanied by: self. Pt did not stay for observation.    Chad Lara RN

## 2017-10-09 NOTE — MR AVS SNAPSHOT
After Visit Summary   10/9/2017    Demetri Mosley    MRN: [id number]           Patient Information     Date Of Birth          1980        Visit Information        Provider Department      10/9/2017 9:30 AM ROOM 8 [facility]        Today's Diagnoses     Chronic urticaria    -  1    Spasm of sphincter of Oddi        Chronic pain syndrome           Follow-ups after your visit        Your next 10 appointments already scheduled     Oct 09, 2017  9:30 AM CDT   Level 2 with ROOM 8 [facility]fusion ([facility])    [facility]  [address]  [facility] [address]   [phone]            Oct 23, 2017  9:30 AM CDT   Level 2 with ROOM 6 [facility]fusion ([facility])    [facility]  [address]  [place] [address]   [phone]            Nov 06, 2017  9:00 AM CST   Level 2 with ROOM 7 [facility] ([facility])    [facility]  [address]  [facility] [address]   [phone]            Nov 20, 2017  9:00 AM CST   Level 2 with ROOM 8 [facility] ([facility])    [facility]  [address]  [facility] [address]   [phone]            Dec 04, 2017  9:00 AM CST   Level 2 with ROOM 8 [facility] ([facility])    [facility]  [address]  [facility] [address]   [phone]              Who to contact     If you have questions or need follow up information about today's clinic visit or your schedule please contact [facility] CANC[facility] INFUSION directly at [phone].  Normal or non-critical lab and imaging results will be communicated to you by MyChart, letter or phone within 4 business days after the clinic has received the results. If you do not hear from us within 7 days, please contact  the clinic through [employer] or phone. If you have a critical or abnormal lab result, we will notify you by phone as soon as possible.  Submit refill requests through [employer] or call your pharmacy and they will forward the refill request to us. Please allow 3 business days for your refill to be completed.          Additional Information About Your Visit        [employer] Information     [employer] gives you secure access to your electronic health record. If you see a primary care provider, you can also send messages to your care team and make appointments. If you have questions, please call your primary care clinic.  If you do not have a primary care provider, please call [phone] and they will assist you.        Care EveryWhere ID     This is your Care EveryWhere ID. This could be used by other organizations to access your [place] medical records  [id number]        Your Vitals Were     Pulse Temperature Respirations Last Period          86 98.7  F (37.1  C) 16 11/21/2001         Blood Pressure from Last 3 Encounters:   10/09/17 143/82   09/25/17 (!) 134/94   09/11/17 (!) 134/96    Weight from Last 3 Encounters:   02/01/17 73.5 kg (162 lb 0.6 oz)   01/11/17 73.6 kg (162 lb 3.2 oz)   01/09/17 74.4 kg (164 lb)              Today, you had the following     No orders found for display         Today's Medication Changes          These changes are accurate as of: 10/9/17  8:51 AM.  If you have any questions, ask your nurse or doctor.               These medicines have changed or have updated prescriptions.        Dose/Directions    hydrochlorothiazide 25 MG tablet   Commonly known as:  HYDRODIURIL   This may have changed:  additional instructions   Used for:  Essential hypertension, benign        Dose:  25 mg   Take 1 tablet (25 mg) by mouth daily TAKE ONE TABLET BY MOUTH ONE TIME DAILY   Quantity:  90 tablet   Refills:  3       lisinopril 20 MG tablet   Commonly known as:  PRINIVIL/ZESTRIL   This may have changed:    -  how much to take  - additional instructions   Used for:  Essential hypertension, benign        Dose:  20 mg   Take 1 tablet (20 mg) by mouth daily   Quantity:  90 tablet   Refills:  3                Primary Care Provider Office Phone # Fax #    Nikolay Baeza [phone]       [address]  [place] [facility] [address]        Equal Access to Services     [facility] : Hadii aad ku hadasho Soomaali, waaxda luqadaha, qaybta kaalmada adeegyada, waxay idiin hayaan adeeg kharash la'aan . So [facility] [phone].    ATENCIÓN: Si habla español, tiene a carpio disposición servicios gratuitos de asistencia lingüística. Llame al [phone].    We comply with applicable federal civil rights laws and [place] laws. We do not discriminate on the basis of race, color, national origin, age, disability, sex, sexual orientation, or gender identity.            Thank you!     Thank you for choosing [facility]  for your care. Our goal is always to provide you with excellent care. Hearing back from our patients is one way we can continue to improve our services. Please take a few minutes to complete the written survey that you may receive in the mail after your visit with us. Thank you!             Your Updated Medication List - Protect others around you: Learn how to safely use, store and throw away your medicines at www.disposemymeds.org.          This list is accurate as of: 10/9/17  8:51 AM.  Always use your most recent med list.                   Brand Name Dispense Instructions for use Diagnosis    acetaminophen 325 MG tablet    TYLENOL    100 tablet    Take 2 tablets (650 mg) by mouth every 4 hours as needed for other (mild pain)    Portacath in place       * tocilizumab 80 MG/4ML    ACTEMRA     Reported on 3/16/2017        * ACTEMRA 400 MG/20ML Soln   Generic drug:  Tocilizumab      Hold until resumed by rheumatology. Previously was injecting very 28 days.        albuterol 108 (90  "BASE) MCG/ACT Inhaler    PROAIR HFA    1 Inhaler    Inhale 2 puffs into the lungs every 4 hours as needed for shortness of breath / dyspnea    Mild intermittent asthma without complication       ALBUTEROL IN      Inhale 2 puffs into the lungs        B-D TB SYRINGE 27G X 1/2\" 1 ML Misc   Generic drug:  tuberculin-allergy syringes           butalbital-APAP-caffeine-codeine -28-30 MG per capsule    FIORICET WITH codeine     Take 1 capsule by mouth every 4 hours as needed for headaches or migraine        CATHFLO ACTIVASE 2 MG injection   Generic drug:  alteplase      by Injection route ONCE.        cetirizine 10 MG tablet    zyrTEC    30 tablet    Take 4 tablets (40 mg) by mouth At Bedtime        CoQ-10 100 MG Caps      Take 1 capsule by mouth every evening        cyanocobalamin 1000 MCG/ML injection    VITAMIN B12    10 mL    Inject 1 mL (1,000 mcg) into the muscle every 30 days Inject  intramuscular every 4 weeks.  May give multi-use vial.    Vitamin B 12 deficiency       EPIPEN 2-JOSE 0.3 MG/0.3ML injection 2-pack   Generic drug:  EPINEPHrine      Inject 0.3 mg into the muscle once as needed        fluticasone 50 MCG/ACT spray    FLONASE    3 Package    Spray 1-2 sprays into both nostrils daily    Allergic rhinitis       FOLIC ACID PO      Take 3 mg by mouth daily        furosemide 20 MG tablet    LASIX    30 tablet    Take 0.5-1 tablets (10-20 mg) by mouth daily as needed On long work days.    Peripheral edema       HEPARIN SODIUM (PORCINE) IJ           hydrochlorothiazide 25 MG tablet    HYDRODIURIL    90 tablet    Take 1 tablet (25 mg) by mouth daily TAKE ONE TABLET BY MOUTH ONE TIME DAILY    Essential hypertension, benign       HYDROcodone-acetaminophen 5-325 MG per tablet    NORCO    60 tablet    Take 1 tablet by mouth every 6 hours as needed for moderate to severe pain    Rheumatoid arthritis, involving unspecified site, unspecified rheumatoid factor presence (H)       hydrOXYzine 25 MG tablet    ATARAX    " 120 tablet    Take 1-4 tablets ( mg) by mouth every 6 hours as needed for anxiety    Situational anxiety       ipratropium - albuterol 0.5 mg/2.5 mg/3 mL 0.5-2.5 (3) MG/3ML neb solution    DUONEB    1 Box    Take 1 vial (3 mLs) by nebulization every 4 hours as needed for shortness of breath / dyspnea or wheezing    Mild intermittent asthma without complication       lactated ringers Soln     1500 mL    Infuse 3 TIMES WEEKLY PRN during times of exacerbation of her nausea/vomiting/intractable abdominal pain.  Please page [phone] or call [phone] with questions regarding this therapy plan.  VORB per Dr. Angus Padgett, per protocol.    Dehydration       levOCARNitine 330 MG tablet    CARNITOR    90 tablet    TAKE ONE TABLET BY MOUTH THREE TIMES DAILY    Intractable cyclical vomiting with nausea       lisinopril 20 MG tablet    PRINIVIL/ZESTRIL    90 tablet    Take 1 tablet (20 mg) by mouth daily    Essential hypertension, benign       LORazepam 1 MG tablet    ATIVAN    30 tablet    Take 0.5-1 tablets (0.5-1 mg) by mouth nightly as needed for anxiety    Situational anxiety       methotrexate 50 MG/2ML injection CHEMO      Inject 0.8 mLs (20 mg) Subcutaneous once a week Hold until resumed by rheumatology. Previously receiving weekly on mondays        metoclopramide 10 MG/10ML Soln solution    REGLAN    1200 mL    Take 10 mLs (10 mg) by mouth 3 times daily (before meals)    Gastroesophageal reflux disease, esophagitis presence not specified       mometasone-formoterol 200-5 MCG/ACT oral inhaler    DULERA     Inhale 2 puffs into the lungs 2 times daily        montelukast 10 MG tablet    SINGULAIR    90 tablet    Take 1 tablet (10 mg) by mouth At Bedtime    Seasonal allergic rhinitis, Mild intermittent asthma without complication       OMALIZUMAB SC      Inject 300 mg Subcutaneous        ondansetron 2 MG/ML Soln injection    ZOFRAN    2000 mL    Inject 2 mLs (4 mg) into the vein as needed 3 TIMES WEEKLY PRN  with PRN IV infusions  May repeat x 1 per individual infusion.  Please page the nurse at [phone] or call [phone] Option 4 with questions about this order.    Dehydration       ondansetron 8 MG ODT tab    ZOFRAN-ODT    90 tablet    Take 1 tablet (8 mg) by mouth every 8 hours as needed for nausea 1 tablet 3 times daily. If not covered, try solution at same dosing.    Seasonal allergic rhinitis       order for DME     1 each    Equipment being ordered: #1 pair of fitted knee high compression hose.  25-30mgHG    Peripheral edema       pantoprazole 40 MG EC tablet    PROTONIX    90 tablet    Take 1 tablet (40 mg) by mouth daily    Esophageal reflux       potassium chloride Powd      10 mEq daily        predniSONE 5 MG tablet    DELTASONE     Take 7.5 mg by mouth daily    Rheumatoid arthritis, involving unspecified site, unspecified rheumatoid factor presence (H)       prenatal multivitamin plus iron 27-0.8 MG Tabs per tablet      Take 1 tablet by mouth daily        ranitidine 150 MG capsule    ZANTAC    60 capsule    Take 1 capsule (150 mg) by mouth At Bedtime    Gastroesophageal reflux disease without esophagitis       temazepam 15 MG capsule    RESTORIL    30 capsule    Take 1 capsule (15 mg) by mouth nightly as needed for sleep Take 1 capsule by mouth at bedtime if needed for Sleep.    Shift work sleep disorder       tiZANidine 4 MG tablet    ZANAFLEX    90 tablet    Take 1 tablet (4 mg) by mouth every 6 hours as needed for muscle spasms TAKE ONE TABLET BY MOUTH EVERY SIX HOURS AS NEEDED FOR MUSCLE SPASM    Muscle spasm       * Notice:  This list has 2 medication(s) that are the same as other medications prescribed for you. Read the directions carefully, and ask your doctor or other care provider to review them with you.

## 2017-11-06 ENCOUNTER — INFUSION THERAPY VISIT (OUTPATIENT)
Dept: INFUSION THERAPY | Facility: CLINIC | Age: 37
End: 2017-11-06
Attending: ALLERGY & IMMUNOLOGY
Payer: COMMERCIAL

## 2017-11-06 VITALS
HEART RATE: 72 BPM | SYSTOLIC BLOOD PRESSURE: 101 MMHG | DIASTOLIC BLOOD PRESSURE: 69 MMHG | RESPIRATION RATE: 16 BRPM | TEMPERATURE: 96.6 F

## 2017-11-06 DIAGNOSIS — G89.4 CHRONIC PAIN SYNDROME: ICD-10-CM

## 2017-11-06 DIAGNOSIS — L50.8 CHRONIC URTICARIA: Primary | ICD-10-CM

## 2017-11-06 DIAGNOSIS — K83.4 SPASM OF SPHINCTER OF ODDI: ICD-10-CM

## 2017-11-06 PROCEDURE — 96372 THER/PROPH/DIAG INJ SC/IM: CPT

## 2017-11-06 PROCEDURE — 25000128 H RX IP 250 OP 636: Performed by: EMERGENCY MEDICINE

## 2017-11-06 RX ADMIN — OMALIZUMAB 300 MG: 202.5 INJECTION, SOLUTION SUBCUTANEOUS at 08:45

## 2017-11-06 NOTE — MR AVS SNAPSHOT
After Visit Summary   11/6/2017    Demetri Mosley    MRN: [id number]           Patient Information     Date Of Birth          1980        Visit Information        Provider Department      11/6/2017 9:00 AM ROOM 7 [facility]        Today's Diagnoses     Chronic urticaria    -  1    Spasm of sphincter of Oddi        Chronic pain syndrome           Follow-ups after your visit        Your next 10 appointments already scheduled     Nov 20, 2017  9:00 AM CST   Level 2 with ROOM 8 [facility] ([facility])    [facility]  [address]  [facility] [address]   [phone]            Dec 04, 2017  9:00 AM CST   Level 2 with ROOM 8 [facility] ([facility])    [facility]  [address]  [facility] [address]   [phone]              Who to contact     If you have questions or need follow up information about today's clinic visit or your schedule please contact [facility] directly at [phone].  Normal or non-critical lab and imaging results will be communicated to you by Symphonyhart, letter or phone within 4 business days after the clinic has received the results. If you do not hear from us within 7 days, please contact the clinic through Symphonyhart or phone. If you have a critical or abnormal lab result, we will notify you by phone as soon as possible.  Submit refill requests through Arkivum or call your pharmacy and they will forward the refill request to us. Please allow 3 business days for your refill to be completed.          Additional Information About Your Visit        MyChart Information     Arkivum gives you secure access to your electronic health record. If you see a primary care provider, you can also send messages to your care team and make appointments. If you have questions, please call your primary care clinic.  If you do not have a primary  care provider, please call [phone] and they will assist you.        Care EveryWhere ID     This is your Care EveryWhere ID. This could be used by other organizations to access your [place] medical records  [id number]        Your Vitals Were     Pulse Temperature Respirations Last Period          72 96.6  F (35.9  C) 16 11/21/2001         Blood Pressure from Last 3 Encounters:   11/06/17 101/69   10/09/17 143/82   09/25/17 (!) 134/94    Weight from Last 3 Encounters:   02/01/17 73.5 kg (162 lb 0.6 oz)   01/11/17 73.6 kg (162 lb 3.2 oz)   01/09/17 74.4 kg (164 lb)              Today, you had the following     No orders found for display         Today's Medication Changes          These changes are accurate as of: 11/6/17  9:13 AM.  If you have any questions, ask your nurse or doctor.               These medicines have changed or have updated prescriptions.        Dose/Directions    hydrochlorothiazide 25 MG tablet   Commonly known as:  HYDRODIURIL   This may have changed:  additional instructions   Used for:  Essential hypertension, benign        Dose:  25 mg   Take 1 tablet (25 mg) by mouth daily TAKE ONE TABLET BY MOUTH ONE TIME DAILY   Quantity:  90 tablet   Refills:  3       lisinopril 20 MG tablet   Commonly known as:  PRINIVIL/ZESTRIL   This may have changed:    - how much to take  - additional instructions   Used for:  Essential hypertension, benign        Dose:  20 mg   Take 1 tablet (20 mg) by mouth daily   Quantity:  90 tablet   Refills:  3                Primary Care Provider Office Phone # Fax #    Nikolay Baeza [phone]       [address] [place] [address] [place] [address]        Equal Access to Services     [facility]: Hadii pamela Vickers, waaxda luqdav, qaybta kaaldavid reyna. So [facility] [phone].    ATENCIÓN: Si habla español, tiene a carpio disposición servicios gratuitos de asistencia lingüística.  "Radha houston [phone].    We comply with applicable federal civil rights laws and [place] laws. We do not discriminate on the basis of race, color, national origin, age, disability, sex, sexual orientation, or gender identity.            Thank you!     Thank you for choosing [facility]  for your care. Our goal is always to provide you with excellent care. Hearing back from our patients is one way we can continue to improve our services. Please take a few minutes to complete the written survey that you may receive in the mail after your visit with us. Thank you!             Your Updated Medication List - Protect others around you: Learn how to safely use, store and throw away your medicines at www.disposemymeds.org.          This list is accurate as of: 11/6/17  9:13 AM.  Always use your most recent med list.                   Brand Name Dispense Instructions for use Diagnosis    acetaminophen 325 MG tablet    TYLENOL    100 tablet    Take 2 tablets (650 mg) by mouth every 4 hours as needed for other (mild pain)    Portacath in place       * tocilizumab 80 MG/4ML    ACTEMRA     Reported on 3/16/2017        * ACTEMRA 400 MG/20ML Soln   Generic drug:  Tocilizumab      Hold until resumed by rheumatology. Previously was injecting very 28 days.        albuterol 108 (90 BASE) MCG/ACT Inhaler    PROAIR HFA    1 Inhaler    Inhale 2 puffs into the lungs every 4 hours as needed for shortness of breath / dyspnea    Mild intermittent asthma without complication       ALBUTEROL IN      Inhale 2 puffs into the lungs        B-D TB SYRINGE 27G X 1/2\" 1 ML Misc   Generic drug:  tuberculin-allergy syringes           butalbital-APAP-caffeine-codeine -16-30 MG per capsule    FIORICET WITH codeine     Take 1 capsule by mouth every 4 hours as needed for headaches or migraine        CATHFLO ACTIVASE 2 MG injection   Generic drug:  alteplase      by Injection route ONCE.        cetirizine 10 MG tablet    zyrTEC    30 " tablet    Take 4 tablets (40 mg) by mouth At Bedtime        CoQ-10 100 MG Caps      Take 1 capsule by mouth every evening        cyanocobalamin 1000 MCG/ML injection    VITAMIN B12    10 mL    Inject 1 mL (1,000 mcg) into the muscle every 30 days Inject  intramuscular every 4 weeks.  May give multi-use vial.    Vitamin B 12 deficiency       EPIPEN 2-JOSE 0.3 MG/0.3ML injection 2-pack   Generic drug:  EPINEPHrine      Inject 0.3 mg into the muscle once as needed        fluticasone 50 MCG/ACT spray    FLONASE    3 Package    Spray 1-2 sprays into both nostrils daily    Allergic rhinitis       FOLIC ACID PO      Take 3 mg by mouth daily        furosemide 20 MG tablet    LASIX    30 tablet    Take 0.5-1 tablets (10-20 mg) by mouth daily as needed On long work days.    Peripheral edema       HEPARIN SODIUM (PORCINE) IJ           hydrochlorothiazide 25 MG tablet    HYDRODIURIL    90 tablet    Take 1 tablet (25 mg) by mouth daily TAKE ONE TABLET BY MOUTH ONE TIME DAILY    Essential hypertension, benign       HYDROcodone-acetaminophen 5-325 MG per tablet    NORCO    60 tablet    Take 1 tablet by mouth every 6 hours as needed for moderate to severe pain    Rheumatoid arthritis, involving unspecified site, unspecified rheumatoid factor presence (H)       hydrOXYzine 25 MG tablet    ATARAX    120 tablet    Take 1-4 tablets ( mg) by mouth every 6 hours as needed for anxiety    Situational anxiety       ipratropium - albuterol 0.5 mg/2.5 mg/3 mL 0.5-2.5 (3) MG/3ML neb solution    DUONEB    1 Box    Take 1 vial (3 mLs) by nebulization every 4 hours as needed for shortness of breath / dyspnea or wheezing    Mild intermittent asthma without complication       lactated ringers Soln     1500 mL    Infuse 3 TIMES WEEKLY PRN during times of exacerbation of her nausea/vomiting/intractable abdominal pain.  Please page [phone] or call [phone] with questions regarding this therapy plan.  VORB per Dr. Angus Padgett, per  protocol.    Dehydration       levOCARNitine 330 MG tablet    CARNITOR    90 tablet    TAKE ONE TABLET BY MOUTH THREE TIMES DAILY    Intractable cyclical vomiting with nausea       lisinopril 20 MG tablet    PRINIVIL/ZESTRIL    90 tablet    Take 1 tablet (20 mg) by mouth daily    Essential hypertension, benign       LORazepam 1 MG tablet    ATIVAN    30 tablet    Take 0.5-1 tablets (0.5-1 mg) by mouth nightly as needed for anxiety    Situational anxiety       methotrexate 50 MG/2ML injection CHEMO      Inject 0.8 mLs (20 mg) Subcutaneous once a week Hold until resumed by rheumatology. Previously receiving weekly on mondays        metoclopramide 10 MG/10ML Soln solution    REGLAN    1200 mL    Take 10 mLs (10 mg) by mouth 3 times daily (before meals)    Gastroesophageal reflux disease, esophagitis presence not specified       mometasone-formoterol 200-5 MCG/ACT oral inhaler    DULERA     Inhale 2 puffs into the lungs 2 times daily        montelukast 10 MG tablet    SINGULAIR    90 tablet    Take 1 tablet (10 mg) by mouth At Bedtime    Seasonal allergic rhinitis, Mild intermittent asthma without complication       OMALIZUMAB SC      Inject 300 mg Subcutaneous        ondansetron 2 MG/ML Soln injection    ZOFRAN    2000 mL    Inject 2 mLs (4 mg) into the vein as needed 3 TIMES WEEKLY PRN with PRN IV infusions  May repeat x 1 per individual infusion.  Please page the nurse at [phone] or call [phone] Option 4 with questions about this order.    Dehydration       ondansetron 8 MG ODT tab    ZOFRAN-ODT    90 tablet    Take 1 tablet (8 mg) by mouth every 8 hours as needed for nausea 1 tablet 3 times daily. If not covered, try solution at same dosing.    Seasonal allergic rhinitis       order for DME     1 each    Equipment being ordered: #1 pair of fitted knee high compression hose.  25-30mgHG    Peripheral edema       pantoprazole 40 MG EC tablet    PROTONIX    90 tablet    Take 1 tablet (40 mg) by mouth daily     Esophageal reflux       potassium chloride Powd      10 mEq daily        predniSONE 5 MG tablet    DELTASONE     Take 7.5 mg by mouth daily    Rheumatoid arthritis, involving unspecified site, unspecified rheumatoid factor presence (H)       prenatal multivitamin plus iron 27-0.8 MG Tabs per tablet      Take 1 tablet by mouth daily        ranitidine 150 MG capsule    ZANTAC    60 capsule    Take 1 capsule (150 mg) by mouth At Bedtime    Gastroesophageal reflux disease without esophagitis       temazepam 15 MG capsule    RESTORIL    30 capsule    Take 1 capsule (15 mg) by mouth nightly as needed for sleep Take 1 capsule by mouth at bedtime if needed for Sleep.    Shift work sleep disorder       tiZANidine 4 MG tablet    ZANAFLEX    90 tablet    Take 1 tablet (4 mg) by mouth every 6 hours as needed for muscle spasms TAKE ONE TABLET BY MOUTH EVERY SIX HOURS AS NEEDED FOR MUSCLE SPASM    Muscle spasm       * Notice:  This list has 2 medication(s) that are the same as other medications prescribed for you. Read the directions carefully, and ask your doctor or other care provider to review them with you.

## 2017-11-06 NOTE — PROGRESS NOTES
Infusion Nursing Note:  Demetri Mosley presents today for Xolair.    Patient seen by provider today: No   present during visit today: Not Applicable.    Note: N/A.    Intravenous Access:  NA    Treatment Conditions:  Not Applicable.      Post Infusion Assessment:  Patient tolerated injection without incident.    Discharge Plan:   Patient discharged in stable condition accompanied by: self. Pt stayed for 15min post injection, has Epi pen, left for work.    Chad Lara RN

## 2017-11-20 ENCOUNTER — INFUSION THERAPY VISIT (OUTPATIENT)
Dept: INFUSION THERAPY | Facility: CLINIC | Age: 37
End: 2017-11-20
Attending: ALLERGY & IMMUNOLOGY
Payer: COMMERCIAL

## 2017-11-20 VITALS — HEART RATE: 75 BPM | DIASTOLIC BLOOD PRESSURE: 71 MMHG | SYSTOLIC BLOOD PRESSURE: 104 MMHG | TEMPERATURE: 97.5 F

## 2017-11-20 DIAGNOSIS — G89.4 CHRONIC PAIN SYNDROME: ICD-10-CM

## 2017-11-20 DIAGNOSIS — L50.8 CHRONIC URTICARIA: Primary | ICD-10-CM

## 2017-11-20 DIAGNOSIS — K83.4 SPASM OF SPHINCTER OF ODDI: ICD-10-CM

## 2017-11-20 PROCEDURE — 25000128 H RX IP 250 OP 636: Performed by: EMERGENCY MEDICINE

## 2017-11-20 PROCEDURE — 96372 THER/PROPH/DIAG INJ SC/IM: CPT

## 2017-11-20 RX ADMIN — OMALIZUMAB 300 MG: 202.5 INJECTION, SOLUTION SUBCUTANEOUS at 08:41

## 2017-11-20 NOTE — MR AVS SNAPSHOT
After Visit Summary   11/20/2017    Demetri Mosley    MRN: [id number]           Patient Information     Date Of Birth          1980        Visit Information        Provider Department      11/20/2017 9:00 AM ROOM 8 [facility]        Today's Diagnoses     Chronic urticaria    -  1    Spasm of sphincter of Oddi        Chronic pain syndrome           Follow-ups after your visit        Your next 10 appointments already scheduled     Dec 04, 2017  9:00 AM CST   Level 2 with ROOM 8 [facility] ([facility])    [facility]  [address]  [facility] [address]   [phone]            Dec 18, 2017  9:00 AM CST   Level 2 with ROOM 7 [facility] ([facility])    [facility]  [address]  [facility] [address]   [phone]            Jamir 15, 2018  9:00 AM CST   Level 2 with ROOM 1 [facility] ([facility])    [facility]  [address]  [facility] [address]   [phone]            Jan 29, 2018  9:00 AM CST   Level 2 with ROOM 4 [facility] ([facility])    [facility]  [address]  [facility] [address]   [phone]              Who to contact     If you have questions or need follow up information about today's clinic visit or your schedule please contact [facility] directly at [phone].  Normal or non-critical lab and imaging results will be communicated to you by MyChart, letter or phone within 4 business days after the clinic has received the results. If you do not hear from us within 7 days, please contact the clinic through Teracenthart or phone. If you have a critical or abnormal lab result, we will notify you by phone as soon as possible.  Submit refill requests through Arthena or call your pharmacy and they will  forward the refill request to us. Please allow 3 business days for your refill to be completed.          Additional Information About Your Visit        The TechMaphart Information     Adayana gives you secure access to your electronic health record. If you see a primary care provider, you can also send messages to your care team and make appointments. If you have questions, please call your primary care clinic.  If you do not have a primary care provider, please call [phone] and they will assist you.        Care EveryWhere ID     This is your Care EveryWhere ID. This could be used by other organizations to access your [place] medical records  [id number]        Your Vitals Were     Pulse Temperature Last Period             75 97.5  F (36.4  C) 11/21/2001          Blood Pressure from Last 3 Encounters:   11/20/17 104/71   11/06/17 101/69   10/09/17 143/82    Weight from Last 3 Encounters:   02/01/17 73.5 kg (162 lb 0.6 oz)   01/11/17 73.6 kg (162 lb 3.2 oz)   01/09/17 74.4 kg (164 lb)              Today, you had the following     No orders found for display         Today's Medication Changes          These changes are accurate as of: 11/20/17  9:46 AM.  If you have any questions, ask your nurse or doctor.               These medicines have changed or have updated prescriptions.        Dose/Directions    hydrochlorothiazide 25 MG tablet   Commonly known as:  HYDRODIURIL   This may have changed:  additional instructions   Used for:  Essential hypertension, benign        Dose:  25 mg   Take 1 tablet (25 mg) by mouth daily TAKE ONE TABLET BY MOUTH ONE TIME DAILY   Quantity:  90 tablet   Refills:  3       lisinopril 20 MG tablet   Commonly known as:  PRINIVIL/ZESTRIL   This may have changed:    - how much to take  - additional instructions   Used for:  Essential hypertension, benign        Dose:  20 mg   Take 1 tablet (20 mg) by mouth daily   Quantity:  90 tablet   Refills:  3                Primary Care Provider Office  Phone # Fax #    Nikolay Baeza [phone]       [address]  [facility] [address]        Equal Access to Services     ERIK MULLINS : Hadii pamela nam christiano Sobooali, waaxda luqadaha, qaybta kaalmada ademoisés, david ayon sreeish mars laMarthahal choi. So [facility] [place] [phone].    ATENCIÓN: Si habla español, tiene a carpio disposición servicios gratuitos de asistencia lingüística. Llame al [phone].    We comply with applicable federal civil rights laws and [place] laws. We do not discriminate on the basis of race, color, national origin, age, disability, sex, sexual orientation, or gender identity.            Thank you!     Thank you for choosing [facility]  for your care. Our goal is always to provide you with excellent care. Hearing back from our patients is one way we can continue to improve our services. Please take a few minutes to complete the written survey that you may receive in the mail after your visit with us. Thank you!             Your Updated Medication List - Protect others around you: Learn how to safely use, store and throw away your medicines at www.disposemymeds.org.          This list is accurate as of: 11/20/17  9:46 AM.  Always use your most recent med list.                   Brand Name Dispense Instructions for use Diagnosis    acetaminophen 325 MG tablet    TYLENOL    100 tablet    Take 2 tablets (650 mg) by mouth every 4 hours as needed for other (mild pain)    Portacath in place       * tocilizumab 80 MG/4ML    ACTEMRA     Reported on 3/16/2017        * ACTEMRA 400 MG/20ML Soln   Generic drug:  Tocilizumab      Hold until resumed by rheumatology. Previously was injecting very 28 days.        albuterol 108 (90 BASE) MCG/ACT Inhaler    PROAIR HFA    1 Inhaler    Inhale 2 puffs into the lungs every 4 hours as needed for shortness of breath / dyspnea    Mild intermittent asthma without complication       ALBUTEROL IN      Inhale 2 puffs  "into the lungs        B-D TB SYRINGE 27G X 1/2\" 1 ML Misc   Generic drug:  tuberculin-allergy syringes           butalbital-APAP-caffeine-codeine -49-30 MG per capsule    FIORICET WITH codeine     Take 1 capsule by mouth every 4 hours as needed for headaches or migraine        CATHFLO ACTIVASE 2 MG injection   Generic drug:  alteplase      by Injection route ONCE.        cetirizine 10 MG tablet    zyrTEC    30 tablet    Take 4 tablets (40 mg) by mouth At Bedtime        CoQ-10 100 MG Caps      Take 1 capsule by mouth every evening        cyanocobalamin 1000 MCG/ML injection    VITAMIN B12    10 mL    Inject 1 mL (1,000 mcg) into the muscle every 30 days Inject  intramuscular every 4 weeks.  May give multi-use vial.    Vitamin B 12 deficiency       EPIPEN 2-JOSE 0.3 MG/0.3ML injection 2-pack   Generic drug:  EPINEPHrine      Inject 0.3 mg into the muscle once as needed        fluticasone 50 MCG/ACT spray    FLONASE    3 Package    Spray 1-2 sprays into both nostrils daily    Allergic rhinitis       FOLIC ACID PO      Take 3 mg by mouth daily        furosemide 20 MG tablet    LASIX    30 tablet    Take 0.5-1 tablets (10-20 mg) by mouth daily as needed On long work days.    Peripheral edema       HEPARIN SODIUM (PORCINE) IJ           hydrochlorothiazide 25 MG tablet    HYDRODIURIL    90 tablet    Take 1 tablet (25 mg) by mouth daily TAKE ONE TABLET BY MOUTH ONE TIME DAILY    Essential hypertension, benign       HYDROcodone-acetaminophen 5-325 MG per tablet    NORCO    60 tablet    Take 1 tablet by mouth every 6 hours as needed for moderate to severe pain    Rheumatoid arthritis, involving unspecified site, unspecified rheumatoid factor presence (H)       hydrOXYzine 25 MG tablet    ATARAX    120 tablet    Take 1-4 tablets ( mg) by mouth every 6 hours as needed for anxiety    Situational anxiety       ipratropium - albuterol 0.5 mg/2.5 mg/3 mL 0.5-2.5 (3) MG/3ML neb solution    DUONEB    1 Box    Take 1 vial " (3 mLs) by nebulization every 4 hours as needed for shortness of breath / dyspnea or wheezing    Mild intermittent asthma without complication       lactated ringers Soln     1500 mL    Infuse 3 TIMES WEEKLY PRN during times of exacerbation of her nausea/vomiting/intractable abdominal pain.  Please page [phone] or call [phone] with questions regarding this therapy plan.  VORB per Dr. Angus Padgett, per protocol.    Dehydration       levOCARNitine 330 MG tablet    CARNITOR    90 tablet    TAKE ONE TABLET BY MOUTH THREE TIMES DAILY    Intractable cyclical vomiting with nausea       lisinopril 20 MG tablet    PRINIVIL/ZESTRIL    90 tablet    Take 1 tablet (20 mg) by mouth daily    Essential hypertension, benign       LORazepam 1 MG tablet    ATIVAN    30 tablet    Take 0.5-1 tablets (0.5-1 mg) by mouth nightly as needed for anxiety    Situational anxiety       methotrexate 50 MG/2ML injection CHEMO      Inject 0.8 mLs (20 mg) Subcutaneous once a week Hold until resumed by rheumatology. Previously receiving weekly on mondays        metoclopramide 10 MG/10ML Soln solution    REGLAN    1200 mL    Take 10 mLs (10 mg) by mouth 3 times daily (before meals)    Gastroesophageal reflux disease, esophagitis presence not specified       mometasone-formoterol 200-5 MCG/ACT oral inhaler    DULERA     Inhale 2 puffs into the lungs 2 times daily        montelukast 10 MG tablet    SINGULAIR    90 tablet    Take 1 tablet (10 mg) by mouth At Bedtime    Seasonal allergic rhinitis, Mild intermittent asthma without complication       OMALIZUMAB SC      Inject 300 mg Subcutaneous        ondansetron 2 MG/ML Soln injection    ZOFRAN    2000 mL    Inject 2 mLs (4 mg) into the vein as needed 3 TIMES WEEKLY PRN with PRN IV infusions  May repeat x 1 per individual infusion.  Please page the nurse at [phone] or call [phone] Option 4 with questions about this order.    Dehydration       ondansetron 8 MG ODT tab    ZOFRAN-ODT     90 tablet    Take 1 tablet (8 mg) by mouth every 8 hours as needed for nausea 1 tablet 3 times daily. If not covered, try solution at same dosing.    Seasonal allergic rhinitis       order for DME     1 each    Equipment being ordered: #1 pair of fitted knee high compression hose.  25-30mgHG    Peripheral edema       pantoprazole 40 MG EC tablet    PROTONIX    90 tablet    Take 1 tablet (40 mg) by mouth daily    Esophageal reflux       potassium chloride Powd      10 mEq daily        predniSONE 5 MG tablet    DELTASONE     Take 7.5 mg by mouth daily    Rheumatoid arthritis, involving unspecified site, unspecified rheumatoid factor presence (H)       prenatal multivitamin plus iron 27-0.8 MG Tabs per tablet      Take 1 tablet by mouth daily        ranitidine 150 MG capsule    ZANTAC    60 capsule    Take 1 capsule (150 mg) by mouth At Bedtime    Gastroesophageal reflux disease without esophagitis       temazepam 15 MG capsule    RESTORIL    30 capsule    Take 1 capsule (15 mg) by mouth nightly as needed for sleep Take 1 capsule by mouth at bedtime if needed for Sleep.    Shift work sleep disorder       tiZANidine 4 MG tablet    ZANAFLEX    90 tablet    Take 1 tablet (4 mg) by mouth every 6 hours as needed for muscle spasms TAKE ONE TABLET BY MOUTH EVERY SIX HOURS AS NEEDED FOR MUSCLE SPASM    Muscle spasm       * Notice:  This list has 2 medication(s) that are the same as other medications prescribed for you. Read the directions carefully, and ask your doctor or other care provider to review them with you.

## 2017-11-20 NOTE — PROGRESS NOTES
Infusion Nursing Note:  Demetri Mosley presents today for Xolair.    Patient seen by provider today: No   present during visit today: Not Applicable.    Note: N/A.    Intravenous Access:  No IV access.    Treatment Conditions:  Not Applicable.      Post Infusion Assessment:  Patient tolerated injections x2 in left arm without incident.  Pt was observed for 30 minutes following injections.    Discharge Plan:   Patient discharged in stable condition accompanied by: self.  Departure Mode: Ambulatory.    Dori Adhikari RN

## 2018-07-20 ENCOUNTER — OFFICE VISIT (OUTPATIENT)
Dept: URGENT CARE | Facility: URGENT CARE | Age: 38
End: 2018-07-20
Payer: COMMERCIAL

## 2018-07-20 VITALS
OXYGEN SATURATION: 98 % | DIASTOLIC BLOOD PRESSURE: 84 MMHG | HEART RATE: 87 BPM | TEMPERATURE: 98.1 F | SYSTOLIC BLOOD PRESSURE: 118 MMHG

## 2018-07-20 DIAGNOSIS — M06.9 FLARE OF RHEUMATOID ARTHRITIS (H): Primary | ICD-10-CM

## 2018-07-20 DIAGNOSIS — Z98.890 S/P BREAST RECONSTRUCTION, BILATERAL: ICD-10-CM

## 2018-07-20 PROCEDURE — 99213 OFFICE O/P EST LOW 20 MIN: CPT | Performed by: FAMILY MEDICINE

## 2018-07-20 ASSESSMENT — PAIN SCALES - GENERAL: PAINLEVEL: MILD PAIN (3)

## 2018-07-20 NOTE — MR AVS SNAPSHOT
After Visit Summary   7/20/2018    Demetri Mosley    MRN: [id number]           Patient Information     Date Of Birth          1980        Visit Information        Provider Department      7/20/2018 6:15 PM Cheri Quan MD [facility]        Today's Diagnoses     Flare of rheumatoid arthritis (H)    -  1    S/P breast reconstruction, bilateral           Follow-ups after your visit        Who to contact     If you have questions or need follow up information about today's clinic visit or your schedule please contact [facility] directly at [phone].  Normal or non-critical lab and imaging results will be communicated to you by Ucha.sehart, letter or phone within 4 business days after the clinic has received the results. If you do not hear from us within 7 days, please contact the clinic through Ucha.sehart or phone. If you have a critical or abnormal lab result, we will notify you by phone as soon as possible.  Submit refill requests through OneRiot or call your pharmacy and they will forward the refill request to us. Please allow 3 business days for your refill to be completed.          Additional Information About Your Visit        MyChart Information     OneRiot gives you secure access to your electronic health record. If you see a primary care provider, you can also send messages to your care team and make appointments. If you have questions, please call your primary care clinic.  If you do not have a primary care provider, please call [phone] and they will assist you.        Care EveryWhere ID     This is your Care EveryWhere ID. This could be used by other organizations to access your [place] medical records  [id number]        Your Vitals Were     Pulse Temperature Last Period Pulse Oximetry          87 98.1  F (36.7  C) (Oral) 11/21/2001 98%         Blood Pressure from Last 3 Encounters:   07/20/18 118/84   11/20/17 104/71   11/06/17 101/69    Weight  from Last 3 Encounters:   02/01/17 162 lb 0.6 oz (73.5 kg)   01/11/17 162 lb 3.2 oz (73.6 kg)   01/09/17 164 lb (74.4 kg)              Today, you had the following     No orders found for display         Today's Medication Changes          These changes are accurate as of 7/20/18  9:28 PM.  If you have any questions, ask your nurse or doctor.               These medicines have changed or have updated prescriptions.        Dose/Directions    hydrochlorothiazide 25 MG tablet   Commonly known as:  HYDRODIURIL   This may have changed:  additional instructions   Used for:  Essential hypertension, benign        Dose:  25 mg   Take 1 tablet (25 mg) by mouth daily TAKE ONE TABLET BY MOUTH ONE TIME DAILY   Quantity:  90 tablet   Refills:  3       lisinopril 20 MG tablet   Commonly known as:  PRINIVIL/ZESTRIL   This may have changed:    - how much to take  - additional instructions   Used for:  Essential hypertension, benign        Dose:  20 mg   Take 1 tablet (20 mg) by mouth daily   Quantity:  90 tablet   Refills:  3                Primary Care Provider Office Phone # Fax #    Nikolay Baeza [phone]       [address] [place]  [facility] [address]        Equal Access to Services     ERIK MULLINS AH: Hadii pamela nam hadasho Soomaali, waaxda luqadaha, qaybta kaalmada adeegyada, david choi. So [facility] [phone].    ATENCIÓN: Si habla español, tiene a carpio disposición servicios gratuitos de asistencia lingüística. Llame al [phone].    We comply with applicable federal civil rights laws and [place] laws. We do not discriminate on the basis of race, color, national origin, age, disability, sex, sexual orientation, or gender identity.            Thank you!     Thank you for choosing [facility]  for your care. Our goal is always to provide you with excellent care. Hearing back from our patients is one way we can continue to improve our services.  "Please take a few minutes to complete the written survey that you may receive in the mail after your visit with us. Thank you!             Your Updated Medication List - Protect others around you: Learn how to safely use, store and throw away your medicines at www.disposemymeds.org.          This list is accurate as of 7/20/18  9:28 PM.  Always use your most recent med list.                   Brand Name Dispense Instructions for use Diagnosis    acetaminophen 325 MG tablet    TYLENOL    100 tablet    Take 2 tablets (650 mg) by mouth every 4 hours as needed for other (mild pain)    Portacath in place       * tocilizumab 80 MG/4ML    ACTEMRA     Reported on 3/16/2017        * ACTEMRA 400 MG/20ML Soln   Generic drug:  Tocilizumab      Hold until resumed by rheumatology. Previously was injecting very 28 days.        albuterol 108 (90 Base) MCG/ACT Inhaler    PROAIR HFA    1 Inhaler    Inhale 2 puffs into the lungs every 4 hours as needed for shortness of breath / dyspnea    Mild intermittent asthma without complication       ALBUTEROL IN      Inhale 2 puffs into the lungs        B-D TB SYRINGE 27G X 1/2\" 1 ML Misc   Generic drug:  tuberculin-allergy syringes           butalbital-APAP-caffeine-codeine -94-30 MG per capsule    FIORICET WITH codeine     Take 1 capsule by mouth every 4 hours as needed for headaches or migraine        CATHFLO ACTIVASE 2 MG injection   Generic drug:  alteplase      by Injection route ONCE.        cetirizine 10 MG tablet    zyrTEC    30 tablet    Take 4 tablets (40 mg) by mouth At Bedtime        CoQ-10 100 MG Caps      Take 1 capsule by mouth every evening        cyanocobalamin 1000 MCG/ML injection    VITAMIN B12    10 mL    Inject 1 mL (1,000 mcg) into the muscle every 30 days Inject  intramuscular every 4 weeks.  May give multi-use vial.    Vitamin B 12 deficiency       EPIPEN 2-JOSE 0.3 MG/0.3ML injection 2-pack   Generic drug:  EPINEPHrine      Inject 0.3 mg into the muscle once as " needed        fluticasone 50 MCG/ACT spray    FLONASE    3 Package    Spray 1-2 sprays into both nostrils daily    Allergic rhinitis       FOLIC ACID PO      Take 3 mg by mouth daily        furosemide 20 MG tablet    LASIX    30 tablet    Take 0.5-1 tablets (10-20 mg) by mouth daily as needed On long work days.    Peripheral edema       HEPARIN SODIUM (PORCINE) IJ           hydrochlorothiazide 25 MG tablet    HYDRODIURIL    90 tablet    Take 1 tablet (25 mg) by mouth daily TAKE ONE TABLET BY MOUTH ONE TIME DAILY    Essential hypertension, benign       HYDROcodone-acetaminophen 5-325 MG per tablet    NORCO    60 tablet    Take 1 tablet by mouth every 6 hours as needed for moderate to severe pain    Rheumatoid arthritis, involving unspecified site, unspecified rheumatoid factor presence (H)       hydrOXYzine 25 MG tablet    ATARAX    120 tablet    Take 1-4 tablets ( mg) by mouth every 6 hours as needed for anxiety    Situational anxiety       ipratropium - albuterol 0.5 mg/2.5 mg/3 mL 0.5-2.5 (3) MG/3ML neb solution    DUONEB    1 Box    Take 1 vial (3 mLs) by nebulization every 4 hours as needed for shortness of breath / dyspnea or wheezing    Mild intermittent asthma without complication       lactated ringers Soln     1500 mL    Infuse 3 TIMES WEEKLY PRN during times of exacerbation of her nausea/vomiting/intractable abdominal pain.  Please page [phone] or call [phone] with questions regarding this therapy plan.  VORB per Dr. Angus Padgett, per protocol.    Dehydration       levOCARNitine 330 MG tablet    CARNITOR    90 tablet    TAKE ONE TABLET BY MOUTH THREE TIMES DAILY    Intractable cyclical vomiting with nausea       lisinopril 20 MG tablet    PRINIVIL/ZESTRIL    90 tablet    Take 1 tablet (20 mg) by mouth daily    Essential hypertension, benign       LORazepam 1 MG tablet    ATIVAN    30 tablet    Take 0.5-1 tablets (0.5-1 mg) by mouth nightly as needed for anxiety    Situational anxiety        methotrexate 50 MG/2ML injection CHEMO      Inject 0.8 mLs (20 mg) Subcutaneous once a week Hold until resumed by rheumatology. Previously receiving weekly on mondays        metoclopramide 10 MG/10ML Soln solution    REGLAN    1200 mL    Take 10 mLs (10 mg) by mouth 3 times daily (before meals)    Gastroesophageal reflux disease, esophagitis presence not specified       mometasone-formoterol 200-5 MCG/ACT oral inhaler    DULERA     Inhale 2 puffs into the lungs 2 times daily        montelukast 10 MG tablet    SINGULAIR    90 tablet    Take 1 tablet (10 mg) by mouth At Bedtime    Seasonal allergic rhinitis, Mild intermittent asthma without complication       OMALIZUMAB SC      Inject 300 mg Subcutaneous        ondansetron 2 MG/ML Soln injection    ZOFRAN    2000 mL    Inject 2 mLs (4 mg) into the vein as needed 3 TIMES WEEKLY PRN with PRN IV infusions  May repeat x 1 per individual infusion.  Please page the nurse at [phone] or call [phone] Option 4 with questions about this order.    Dehydration       ondansetron 8 MG ODT tab    ZOFRAN-ODT    90 tablet    Take 1 tablet (8 mg) by mouth every 8 hours as needed for nausea 1 tablet 3 times daily. If not covered, try solution at same dosing.    Seasonal allergic rhinitis       order for DME     1 each    Equipment being ordered: #1 pair of fitted knee high compression hose.  25-30mgHG    Peripheral edema       pantoprazole 40 MG EC tablet    PROTONIX    90 tablet    Take 1 tablet (40 mg) by mouth daily    Esophageal reflux       potassium chloride Powd      10 mEq daily        predniSONE 5 MG tablet    DELTASONE     Take 7.5 mg by mouth daily    Rheumatoid arthritis, involving unspecified site, unspecified rheumatoid factor presence (H)       prenatal multivitamin plus iron 27-0.8 MG Tabs per tablet      Take 1 tablet by mouth daily        ranitidine 150 MG capsule    ZANTAC    60 capsule    Take 1 capsule (150 mg) by mouth At Bedtime    Gastroesophageal  reflux disease without esophagitis       temazepam 15 MG capsule    RESTORIL    30 capsule    Take 1 capsule (15 mg) by mouth nightly as needed for sleep Take 1 capsule by mouth at bedtime if needed for Sleep.    Shift work sleep disorder       tiZANidine 4 MG tablet    ZANAFLEX    90 tablet    Take 1 tablet (4 mg) by mouth every 6 hours as needed for muscle spasms TAKE ONE TABLET BY MOUTH EVERY SIX HOURS AS NEEDED FOR MUSCLE SPASM    Muscle spasm       * Notice:  This list has 2 medication(s) that are the same as other medications prescribed for you. Read the directions carefully, and ask your doctor or other care provider to review them with you.

## 2018-07-21 NOTE — NURSING NOTE
"Chief Complaint   Patient presents with     Swelling     C/o bilat ring finger swelling x 2 days. S/p breast reconstruction surg from 7/17/18. No other sx.        Initial /84  Pulse 87  Temp 98.1  F (36.7  C) (Oral)  LMP 11/21/2001  SpO2 98% Estimated body mass index is 30.62 kg/(m^2) as calculated from the following:    Height as of 2/1/17: 5' 1\" (1.549 m).    Weight as of 2/1/17: 162 lb 0.6 oz (73.5 kg)..  BP completed using cuff size: abeba Enriquez CMA    "

## 2018-07-21 NOTE — PROGRESS NOTES
SUBJECTIVE:  Chief Complaint   Patient presents with     Swelling     C/o bilat ring finger swelling x 2 days. S/p breast reconstruction surg from 7/17/18. No other sx.      Demetri Mosley is a 38 year old female who presents with a chief complaint of bilateral finger redness, swelling pain and tenderness.  Symptoms began 2 day(s) ago, are moderate and gradual onset, still present and constant  Context:  She has rheumatoid arthritis and was stopped on her methotrexate 1 month ago and her Omulizumab was stopped 2 weeks ago in preparation to have breast reconstruction surgery- done 3 days ago-  She still has drains and sutures in  She called her rheumatologist and was told to go to  for evaluation of her finger swelling  She says the pain from her breast surgery is far greater than her finger pain-  Has used some cool packs on her fingers     Past Medical History:   Diagnosis Date     Allergic rhinitis due to animal dander      Anemia      Arthritis     rheumatoid     Chronic abdominal pain      Cyclic vomiting syndrome      Diagnostic skin and sensitization tests (aka ALLERGENS) 2/19/15 IgE tests pos. for: cat/dog/DM/T/G/RW, NEG  for fish and shellfish.     DVT (deep venous thrombosis) (H) 2005    s/p long hospitalization - anticoagulated x 6 months     Endometriosis 2009    total hysterectomy     Gastro-oesophageal reflux disease      House dust mite allergy     2/19/15 IgE tests pos. for: cat/dog/DM/T/G/RW, NEG  for fish and shellfish.     Migraines      parotid mass 2012    benign, left, removed     PONV (postoperative nausea and vomiting)      Pyelonephritis due to Escherichia coli 5/15/2016     Uncomplicated asthma      Patient Active Problem List   Diagnosis     H/O personality disorder     Attention deficit disorder     B-complex deficiency     Chronic pain syndrome     Persistent vomiting     Rheumatoid arthritis (H)     Spasm of sphincter of Oddi     Enthesopathy of hip region     Cyclic vomiting  "syndrome     Essential hypertension, benign     Urticaria     Intermittent asthma     Gastroesophageal reflux disease without esophagitis     Anxiety     Neck pain     CARDIOVASCULAR SCREENING; LDL GOAL LESS THAN 130     Shift work sleep disorder     Vitamin D deficiency     Portacath in place     House dust mite allergy     Allergic rhinitis due to animal dander     Seasonal allergic rhinitis     Diagnostic skin and sensitization tests (aka ALLERGENS)     Mastocytosis     TOS (thoracic outlet syndrome)     Primary hypercoagulable state [289.81]     Vomiting     Brachial neuritis or radiculitis     Pain of left hand     Mechanical problems with limbs     Hand weakness     Finger stiffness, left     Anemia, iron deficiency     Pyelonephritis due to Escherichia coli     Bacteremia     Encounter for blood transfusion     Fever, intermittent     History of recurrent urinary tract infection     Chronic urticaria     Chronic gastroesophageal reflux disease     GERD (gastroesophageal reflux disease)       ALLERGIES:  Propranolol; Compazine; Fluoxetine; Gabapentin; Lyrica; Metoclopramide; Pregabalin; Prochlorperazine; Reglan; Vancomycin; Wheat; Wheat bran; Adhesive tape; Contrast dye; Diatrizoate; Dye fdc red [red dye]; Latex; Liquid adhesive; No clinical screening - see comments; and Penicillins      Current Outpatient Prescriptions on File Prior to Visit:  acetaminophen (TYLENOL) 325 MG tablet Take 2 tablets (650 mg) by mouth every 4 hours as needed for other (mild pain)   albuterol (ALBUTEROL) 108 (90 BASE) MCG/ACT inhaler Inhale 2 puffs into the lungs every 4 hours as needed for shortness of breath / dyspnea   ALBUTEROL IN Inhale 2 puffs into the lungs   alteplase (CATHFLO ACTIVASE) 2 MG injection by Injection route ONCE.   B-D TB SYRINGE 27G X 1/2\" 1 ML MISC    butalbital-APAP-caffeine-codeine (FIORICET WITH CODEINE) -14-30 MG per capsule Take 1 capsule by mouth every 4 hours as needed for headaches or migraine "   cetirizine (ZYRTEC) 10 MG tablet Take 4 tablets (40 mg) by mouth At Bedtime   Coenzyme Q10 (COQ-10) 100 MG CAPS Take 1 capsule by mouth every evening    cyanocobalamin (VITAMIN B12) 1000 MCG/ML injection Inject 1 mL (1,000 mcg) into the muscle every 30 days Inject  intramuscular every 4 weeks.  May give multi-use vial.   EPIPEN 2-JOSE 0.3 MG/0.3ML injection Inject 0.3 mg into the muscle once as needed    fluticasone (FLONASE) 50 MCG/ACT nasal spray Spray 1-2 sprays into both nostrils daily   FOLIC ACID PO Take 3 mg by mouth daily   furosemide (LASIX) 20 MG tablet Take 0.5-1 tablets (10-20 mg) by mouth daily as needed On long work days.   HEPARIN SODIUM, PORCINE, IJ    hydrochlorothiazide (HYDRODIURIL) 25 MG tablet Take 1 tablet (25 mg) by mouth daily TAKE ONE TABLET BY MOUTH ONE TIME DAILY (Patient taking differently: Take 25 mg by mouth daily )   HYDROcodone-acetaminophen (NORCO) 5-325 MG per tablet Take 1 tablet by mouth every 6 hours as needed for moderate to severe pain   hydrOXYzine (ATARAX) 25 MG tablet Take 1-4 tablets ( mg) by mouth every 6 hours as needed for anxiety   ipratropium - albuterol 0.5 mg/2.5 mg/3 mL (DUONEB) 0.5-2.5 (3) MG/3ML nebulization Take 1 vial (3 mLs) by nebulization every 4 hours as needed for shortness of breath / dyspnea or wheezing   lactated ringers SOLN Infuse 3 TIMES WEEKLY PRN during times of exacerbation of her nausea/vomiting/intractable abdominal pain.  Please page [phone] or call [phone] with questions regarding this therapy plan.  VORB per Dr. Angus Padgett, per protocol. (Patient not taking: Reported on 9/25/2017)   levOCARNitine (CARNITOR) 330 MG tablet TAKE ONE TABLET BY MOUTH THREE TIMES DAILY (Patient not taking: Reported on 3/16/2017)   lisinopril (PRINIVIL,ZESTRIL) 20 MG tablet Take 1 tablet (20 mg) by mouth daily (Patient taking differently: Take 40 mg by mouth daily Currently on hold while tocilizumab is on hold per rheumatologist)   LORazepam  (ATIVAN) 1 MG tablet Take 0.5-1 tablets (0.5-1 mg) by mouth nightly as needed for anxiety   methotrexate 50 MG/2ML injection Inject 0.8 mLs (20 mg) Subcutaneous once a week Hold until resumed by rheumatology. Previously receiving weekly on mondays   metoclopramide (REGLAN) 10 MG/10ML SOLN solution Take 10 mLs (10 mg) by mouth 3 times daily (before meals)   mometasone-formoterol (DULERA) 200-5 MCG/ACT oral inhaler Inhale 2 puffs into the lungs 2 times daily   montelukast (SINGULAIR) 10 MG tablet Take 1 tablet (10 mg) by mouth At Bedtime   OMALIZUMAB SC Inject 300 mg Subcutaneous   ondansetron (ZOFRAN) 2 MG/ML SOLN injection Inject 2 mLs (4 mg) into the vein as needed 3 TIMES WEEKLY PRN with PRN IV infusions  May repeat x 1 per individual infusion.  Please page the nurse at [phone] or call [phone] Option 4 with questions about this order.   ondansetron (ZOFRAN-ODT) 8 MG disintegrating tablet Take 1 tablet (8 mg) by mouth every 8 hours as needed for nausea 1 tablet 3 times daily. If not covered, try solution at same dosing.   ORDER FOR DME Equipment being ordered: #1 pair of fitted knee high compression hose.  25-30mgHG (Patient not taking: Reported on 3/16/2017)   pantoprazole (PROTONIX) 40 MG enteric coated tablet Take 1 tablet (40 mg) by mouth daily (Patient not taking: Reported on 3/16/2017)   potassium chloride POWD 10 mEq daily    predniSONE (DELTASONE) 5 MG tablet Take 7.5 mg by mouth daily    Prenatal Vit-Fe Fumarate-FA (PRENATAL MULTIVITAMIN  PLUS IRON) 27-0.8 MG TABS Take 1 tablet by mouth daily   ranitidine (ZANTAC) 150 MG capsule Take 1 capsule (150 mg) by mouth At Bedtime   temazepam (RESTORIL) 15 MG capsule Take 1 capsule (15 mg) by mouth nightly as needed for sleep Take 1 capsule by mouth at bedtime if needed for Sleep.   tiZANidine (ZANAFLEX) 4 MG tablet Take 1 tablet (4 mg) by mouth every 6 hours as needed for muscle spasms TAKE ONE TABLET BY MOUTH EVERY SIX HOURS AS NEEDED FOR MUSCLE SPASM    Tocilizumab (ACTEMRA) 400 MG/20ML SOLN Hold until resumed by rheumatology. Previously was injecting very 28 days.   tocilizumab (ACTEMRA) 80 MG/4ML Reported on 3/16/2017     No current facility-administered medications on file prior to visit.     Social History   Substance Use Topics     Smoking status: Former Smoker     Packs/day: 0.25     Years: 5.00     Types: Cigarettes     Quit date: 5/30/2010     Smokeless tobacco: Never Used     Alcohol use Yes      Comment: social - not that often.  Maybe one glass/week.       Family History   Problem Relation Age of Onset     Breast Cancer Mother      Cardiovascular Mother      Depression Mother      Lipids Mother      C.A.D. Father      Neurologic Disorder Father      Cardiovascular Father      Other - See Comments Son      Autism        ROS:  CONSTITUTIONAL:NEGATIVE for fever, chills,    INTEGUMENTARY/SKIN: NEGATIVE for worrisome rashes,    EYES: NEGATIVE for vision changes or irritation  ENT/MOUTH: NEGATIVE for ear, mouth and throat problems  RESP:NEGATIVE for significant cough or SOB    EXAM:   /84  Pulse 87  Temp 98.1  F (36.7  C) (Oral)  LMP 11/21/2001  SpO2 98%  M/S Exam:bilateral  Hands, the fingers have erythema and mild swelling with some stiffness with finger movement    GENERAL APPEARANCE: healthy, alert and no distress  CHEST:  Chest binder in place to support her surgical site/ compress the area  EXTREMITIES: peripheral pulses normal  SKIN: no suspicious lesions or rashes  NEURO: Normal strength and tone, sensory exam grossly normal, mentation intact and speech normal       ASSESSMENT:  Flare of rheumatoid arthritis (H)   Do not advise using immunosuppressives at this time as she is healing from her surgical wounds.  Advise using acetaminophen/ ibuprofen,  Post- op narcotic, topical medications until drains out and healed.  Surgeon should advise of when she may re-start her immunosuppressive medications-  For now symptomatic relief    S/P breast  reconstruction, bilateral   Continue at home management as directed by her surgeon

## 2018-08-21 ENCOUNTER — NURSE TRIAGE (OUTPATIENT)
Dept: NURSING | Facility: CLINIC | Age: 38
End: 2018-08-21

## 2018-08-21 NOTE — TELEPHONE ENCOUNTER
"Demetri has a rash on her face, mostly her cheeks, that looks mild. She however has severe burning pain.  The pain has been ongoing for about a week and a half.  She stated she can't get even water on her face without the pain intensifying.  I instructed she be seen per the guideline. She's trying to get into see a dermatologist and the earliest she can be seen that's in network is February.  She's going to make an appointment for February.    Reason for Disposition    [1] SEVERE pain (e.g., excruciating) AND [2] not improved after 2 hours of pain medicine    Additional Information    Negative: Shock suspected (e.g., cold/pale/clammy skin, too weak to stand, low BP, rapid pulse)    Negative: [1] Similar pain previously AND [2] it was from \"heart attack\"    Negative: [1] Similar pain previously AND [2] it was from \"angina\" AND [3] not relieved by nitroglycerin    Negative: Sounds like a life-threatening emergency to the triager    Negative: Difficulty breathing or unusual sweating (e.g., sweating without exertion)    Negative: Can't close the mouth fully (i.e., \"mouth is locked open\", patient will have difficulty talking)    Negative: [1] Fever AND [2] localized red rash    Negative: [1] Fever AND [2] area of face is swollen    Negative: Patient sounds very sick or weak to the triager    Protocols used: FACE PAIN-ADULT-  Ema SOLANO RN [facility]       "

## 2019-01-03 ENCOUNTER — TELEPHONE (OUTPATIENT)
Dept: SURGERY | Facility: CLINIC | Age: 39
End: 2019-01-03

## 2019-01-03 DIAGNOSIS — K21.9 GASTROESOPHAGEAL REFLUX DISEASE, ESOPHAGITIS PRESENCE NOT SPECIFIED: Primary | ICD-10-CM

## 2019-01-03 NOTE — TELEPHONE ENCOUNTER
I tried to reach patient to discuss pain she was having, had talked to the call center about possibly needing a repeat Nissen.   She had a Nissen fundoplication by Dr. Brito in 2017.    I left VM telling her to be evaluated first by her primary to make sure it was GERD related pain, and gave her the scheduling # to call to arrange an appointment, as needed.   I also said she would need some preliminary testing done before we would see her again.

## 2019-02-06 ENCOUNTER — OFFICE VISIT (OUTPATIENT)
Dept: SURGERY | Facility: CLINIC | Age: 39
End: 2019-02-06
Attending: THORACIC SURGERY (CARDIOTHORACIC VASCULAR SURGERY)
Payer: COMMERCIAL

## 2019-02-06 ENCOUNTER — ANCILLARY PROCEDURE (OUTPATIENT)
Dept: GENERAL RADIOLOGY | Facility: CLINIC | Age: 39
End: 2019-02-06
Payer: COMMERCIAL

## 2019-02-06 ENCOUNTER — TELEPHONE (OUTPATIENT)
Dept: GASTROENTEROLOGY | Facility: CLINIC | Age: 39
End: 2019-02-06

## 2019-02-06 VITALS
HEIGHT: 61 IN | DIASTOLIC BLOOD PRESSURE: 80 MMHG | HEART RATE: 110 BPM | BODY MASS INDEX: 30.81 KG/M2 | SYSTOLIC BLOOD PRESSURE: 111 MMHG | TEMPERATURE: 98.5 F | OXYGEN SATURATION: 100 % | WEIGHT: 163.2 LBS

## 2019-02-06 DIAGNOSIS — Z98.890 HISTORY OF NISSEN FUNDOPLICATION: ICD-10-CM

## 2019-02-06 DIAGNOSIS — K21.9 GERD (GASTROESOPHAGEAL REFLUX DISEASE): Primary | ICD-10-CM

## 2019-02-06 DIAGNOSIS — K21.9 GASTROESOPHAGEAL REFLUX DISEASE, ESOPHAGITIS PRESENCE NOT SPECIFIED: ICD-10-CM

## 2019-02-06 PROCEDURE — G0463 HOSPITAL OUTPT CLINIC VISIT: HCPCS | Mod: ZF

## 2019-02-06 RX ORDER — METHYLPREDNISOLONE 32 MG/1
TABLET ORAL
Qty: 2 TABLET | Refills: 0 | Status: SHIPPED | OUTPATIENT
Start: 2019-02-06 | End: 2022-12-14

## 2019-02-06 ASSESSMENT — PAIN SCALES - GENERAL: PAINLEVEL: NO PAIN (0)

## 2019-02-06 ASSESSMENT — MIFFLIN-ST. JEOR: SCORE: 1362.65

## 2019-02-06 NOTE — LETTER
2/6/2019       RE: Demetri Mosley  [address] [facility] [address]     Dear Colleague,    Thank you for referring your patient, Demetri Mosley, to the [facility]. Please see a copy of my visit note below.    THORACIC SURGERY FOLLOW UP VISIT     Dear Dr. Baeza,  I saw Ms. Mosley in follow-up today in place of Dr Brito. The clinical summary follows:    GLADYS Fernandes is a 38 year-old female with history of Nissen fundoplication for hiatal hernia and GERD, now with dysphagia and esophageal spasm. She describes the pain as sharp and stabbing, worse at night, sometimes associated with swallowing pills. Her main symptoms prior to her operation were nausea, emesis, heartburn, regurgitation, and nocturnal aspiration, which have all resolved since her surgery.       She had extensive preoperative workup (HRM, pH-study, EGD, esophagram) by Dr Padgett prior to surgery however, the results are not available at this time.       PREOP DIAGNOSIS   GERD and hiatal hernia.       PROCEDURE   12/23/2016: Laparoscopic Nissen Fundoplication, EGD  2/1/2017: EGD. Scope was advanced without resistance through esophagus, GEJ and pylorus. No evidence of stenosis, able to advance a 51 Fr savary dilator.        COMPLICATIONS  None     INTERVAL STUDIES  Esophagram 2/5/19: Barium passes through esophagus and stomach, Nissen wrap intact. Pill gets stuck near aortic arch (similar to previous esophagram in 1/23/17), but clears with additional sips of liquids.           ETOH none  TOB former infrequent smoker  BMI 30     PMH  Rheumatoid arthritis   Hypertension   BRCA carrier   Asthma  Migraines  GERD  DVT (2009)  Anemia    PSH  Bilateral prophylactic mastectomy with reconstruction  Laparoscopic Nissen fundoplication (12/23/2016)  Hysterectomy  Cholecystectomy    Meds  Albuterol   Methotrexate 20 mg weekly  Omalizumab  Tocilizumab monthly  Prednisone 7.5 mg daily  Hydrochlorothiazide         From a  personal perspective, Ms. Dennison lives in [place], [place] with her spouse Oscar. She is studying to get a masters in public health and has two adult children.     IMPRESSION (K21.9) GERD (gastroesophageal reflux disease)  (primary encounter diagnosis)  C(Z98.305) History of Nissen fundoplication    38 year-old female 2 years after hiatal hernia repair and Nissen fundoplication, now with dysphagia and esophageal spasm. I would like to repeat a high resolution manometry to evaluate for esophageal dysmotility disorders. CT scan and EGD did not show mechanical problems (malignancy, dysphagia lusoria, etc). Will also obtain an EGD and 24-hr pH Bravo study.      PLAN  -Necessary tests: HRM-Impedance, 24 hr Bravo pH study, EGD.   -RTC with results.   -Will try to obtain records from preoperative workup.   -Contact pharmacy to change all pills to liquid forms or crushed medications.   -Esophageal spasm diary to record symptoms and exacerbating factors.      They had all their questions answered and were in agreement with the plan.  I appreciate the opportunity to participate in the care of your patient and will keep you updated.      Noah Brito MD

## 2019-02-06 NOTE — PROGRESS NOTES
THORACIC SURGERY FOLLOW UP VISIT     Dear Dr. Baeza,  I saw Ms. Mosley in follow-up today in place of Dr Brito. The clinical summary follows:    GLADYS Fernandes is a 38 year-old female with history of Nissen fundoplication for hiatal hernia and GERD, now with dysphagia and esophageal spasm. She describes the pain as sharp and stabbing, worse at night, sometimes associated with swallowing pills. Her main symptoms prior to her operation were nausea, emesis, heartburn, regurgitation, and nocturnal aspiration, which have all resolved since her surgery.       She had extensive preoperative workup (HRM, pH-study, EGD, esophagram) by Dr Padgett prior to surgery however, the results are not available at this time.       PREOP DIAGNOSIS   GERD and hiatal hernia.       PROCEDURE   12/23/2016: Laparoscopic Nissen Fundoplication, EGD  2/1/2017: EGD. Scope was advanced without resistance through esophagus, GEJ and pylorus. No evidence of stenosis, able to advance a 51 Fr savary dilator.        COMPLICATIONS  None     INTERVAL STUDIES  Esophagram 2/5/19: Barium passes through esophagus and stomach, Nissen wrap intact. Pill gets stuck near aortic arch (similar to previous esophagram in 1/23/17), but clears with additional sips of liquids.           ETOH none  TOB former infrequent smoker  BMI 30     PMH  Rheumatoid arthritis   Hypertension   BRCA carrier   Asthma  Migraines  GERD  DVT (2009)  Anemia    PSH  Bilateral prophylactic mastectomy with reconstruction  Laparoscopic Nissen fundoplication (12/23/2016)  Hysterectomy  Cholecystectomy    Meds  Albuterol   Methotrexate 20 mg weekly  Omalizumab  Tocilizumab monthly  Prednisone 7.5 mg daily  Hydrochlorothiazide         From a personal perspective, Ms. Dennison lives in [place], [place] with her spouse Oscar. She is studying to get a masters in public health and has two adult children.     IMPRESSION (K21.9) GERD (gastroesophageal reflux disease)  (primary encounter  diagnosis)  C(Z98.890) History of Nissen fundoplication    38 year-old female 2 years after hiatal hernia repair and Nissen fundoplication, now with dysphagia and esophageal spasm. I would like to repeat a high resolution manometry to evaluate for esophageal dysmotility disorders. CT scan and EGD did not show mechanical problems (malignancy, dysphagia lusoria, etc). Will also obtain an EGD and 24-hr pH Bravo study.      PLAN  -Necessary tests: HRM-Impedance, 24 hr Bravo pH study, EGD.   -RTC with results.   -Will try to obtain records from preoperative workup.   -Contact pharmacy to change all pills to liquid forms or crushed medications.   -Esophageal spasm diary to record symptoms and exacerbating factors.      They had all their questions answered and were in agreement with the plan.  I appreciate the opportunity to participate in the care of your patient and will keep you updated.  Sincerely,

## 2019-02-06 NOTE — NURSING NOTE
"Oncology Rooming Note    February 6, 2019 12:10 PM   Demetri Mosley is a 38 year old female who presents for:    Chief Complaint   Patient presents with     Oncology Clinic Visit     [facility] RETURN-GERD     Initial Vitals: /80   Pulse 110   Temp 98.5  F (36.9  C) (Oral)   Ht 1.549 m (5' 1\")   Wt 74 kg (163 lb 3.2 oz)   LMP 11/21/2001   SpO2 100%   BMI 30.84 kg/m   Estimated body mass index is 30.84 kg/m  as calculated from the following:    Height as of this encounter: 1.549 m (5' 1\").    Weight as of this encounter: 74 kg (163 lb 3.2 oz). Body surface area is 1.78 meters squared.  No Pain (0) Comment: Data Unavailable   Patient's last menstrual period was 11/21/2001.  Allergies reviewed: Yes  Medications reviewed: Yes    Medications: Medication refills not needed today.  Pharmacy name entered into Lessno:    [facility]  [facility] [address]  WRITTEN PRESCRIPTION REQUESTED  [facility] [place], [place] - [address]  [facility] [address] [facility]  [id number] [place] [facility] [address]  [address]IV[address] [place], [place] - [address] [facility]  [id number] - [place], [place] - [address]   [id number] [place], [place] - [address] [facility] [address]    Clinical concerns: No new concerns today  Dr. Brito was NOT notified.    10 minutes for nursing intake (face to face time)     Paulina Guerra              "

## 2019-02-07 ENCOUNTER — HOSPITAL ENCOUNTER (OUTPATIENT)
Facility: CLINIC | Age: 39
End: 2019-02-07
Attending: INTERNAL MEDICINE | Admitting: INTERNAL MEDICINE
Payer: COMMERCIAL

## 2019-02-22 ENCOUNTER — TELEPHONE (OUTPATIENT)
Dept: SURGERY | Facility: CLINIC | Age: 39
End: 2019-02-22

## 2019-02-22 NOTE — TELEPHONE ENCOUNTER
Patient saw Dr. Segura on 2/6 for follow up after a hernia repair and Nissen presenting with dysphagia and esophageal spasm.     Dr. Segura recommended several diagnostic tests. However patient reported [place] at the end of the month as she can no longer afford the premiums and she says she does not qualify for any state programs.     She was tearful on the phone as she is worried what she should do since she still has dysphagia and will soon have no health coverage.     Called Alesha as Griselda is unavailable. No answer - [facility] asking Alesha to call her back regarding any medical recommendations.     Also gave her Karishma Booth's LICSW, phone number to check into possible emergency medical coverage.

## 2019-02-25 ENCOUNTER — PATIENT OUTREACH (OUTPATIENT)
Dept: GASTROENTEROLOGY | Facility: CLINIC | Age: 39
End: 2019-02-25

## 2019-02-25 NOTE — PROGRESS NOTES
Social Work Intervention  [facility]    Data/Intervention:    Patient Name:  Demetri Mosley  /Age:  1980 (38 year old)    Visit Type: telephone  Referral Source: GI Clinic  Reason for Referral:  Insurance issues    Collaborated With:    -lauri Nice financial counselor    Patient Concerns/Issues:   Pt reports that she has been on [employer] [place] however, her son who is disabled moved into a group home and a dtr is not living in their home and so when she tried to renew this year, her household size is just 2 with her spouse. With spouse's income, it puts them over the [place] allowed amount (and [place] [employer]). She reports that she met with a [facility] navigator and was told that her monthly premium would be $500. And deductible is $13,000. And she doesn't qualify for tax credits. She states she was also told that it's so high cost because of the cost of the medications she takes    Intervention/Education/Resources Provided:  Reviewed the income guidelines on the m[employer]ure website and based upon what she indicated for income, they should qualify for tax credits that can apply toward the premium. I have not ever been aware that there are considerations for high cost care/treatments thru [employer]. Pre-existing conditions are not considered.  Reviewed with [employer] counselor who indicated that they are not certified navigators for the private health plans on [facility], just for the state health care programs.   I encouraged her to make an appt with [facility] to meet with a Navigator to make sure that she is receiving the proper information on what's available to her. Provided phone # and she will contact them.     Assessment/Plan:  Pt at risk of being uninsured due to costs for health insurance provided by an [facility] navigator. Encouraged her to meet with another navigator and to contact me with results. She plans to do that asap.     Provided  patient/family with contact information and availability.    AMRIK Strauss, [facility]    [facility]  [phone]

## 2019-03-13 ENCOUNTER — TELEPHONE (OUTPATIENT)
Dept: GASTROENTEROLOGY | Facility: CLINIC | Age: 39
End: 2019-03-13

## 2019-03-13 NOTE — TELEPHONE ENCOUNTER
Pt states she contacted Dr. Brito's office at the end of February to say that she would need to cancel this appointment owing lack of health insurance.  She will call to reschedule when she again gets coverage.     Ana Paula Vasquez, RN  [facility]     Additional Information regarding appointment:    Patient scheduled for:  [x] EGD  [] Colonoscopy  [] EUS  [] Flex Sig   [] Other:     Indication for procedure. [] Screening   [x] GERD (gastroesophageal reflux disease)    Procedure Provider:  Igor      Referring Provider. Daryl    Original procedure date/time: Thurs; 3/21/19; 0920    Original Facility location:   [x][address], 1st Floor, [address]  [address] [facility], 5th floor     Anticoagulants or blood thinners: [x]None [] ASA 81mg [] Warfarin  [] Warfarin + Lovenox bridge [] Plavix [] Effient [] Eliquis  [] Xarelto  [] Brilinta [] NSAIDS  [] Other    LAST anticoagulant dose: Date/Time:   INR:   Electronic implanted devices: [x] No  [] IPG  []  ICD  []  LVAD  []   H&P / Pre op physical completed: [x] N/A, [] Complete, Date , [] Scheduled, Date , [] No,

## 2019-04-11 ENCOUNTER — HOSPITAL ENCOUNTER (OUTPATIENT)
Facility: CLINIC | Age: 39
End: 2019-04-11
Attending: INTERNAL MEDICINE | Admitting: INTERNAL MEDICINE
Payer: MEDICAID

## 2019-05-06 ENCOUNTER — TELEPHONE (OUTPATIENT)
Dept: GASTROENTEROLOGY | Facility: CLINIC | Age: 39
End: 2019-05-06

## 2019-05-06 NOTE — TELEPHONE ENCOUNTER
"Patient scheduled for Esophageal Manometry     Indication for procedure. GERD    Referring Provider. Dr. Brito    ? no    Arrival time verified? 9:30 am    Facility location verified? [address]; 1st floor    Instructions given regarding prep and procedure    Prep Type npo 4 hours prior to exam    5'1\" 163 lb  Has been off Protonix since February '19. Tenatively scheduled for a Bravo test. Will notify MD of patients concerns over having the Bravo test.         "

## 2019-05-08 ENCOUNTER — HOSPITAL ENCOUNTER (OUTPATIENT)
Facility: CLINIC | Age: 39
Discharge: HOME | End: 2019-05-08
Attending: INTERNAL MEDICINE | Admitting: INTERNAL MEDICINE
Payer: MEDICAID

## 2019-05-08 PROCEDURE — 91037 ESOPH IMPED FUNCTION TEST: CPT | Performed by: INTERNAL MEDICINE

## 2019-05-08 NOTE — OR NURSING
Patient here for Esophageal Motility study.     Procedure explained to patient and consent signed.    Motility catheter placed via R nares to  51   Cm    Normal saline swallows given per protocol.    Patient tolerated swallows    Catheter removed.    Patient discharged to home

## 2019-05-08 NOTE — DISCHARGE INSTRUCTIONS
1. Resume normal diet.  2. You may a bloody or runny nose and/or sore throat after procedure.  3. Any questions or problems, call [phone] Monday-Friday 7-4:30, after hours call [phone] and ask for GI fellow on call.

## 2019-05-09 ENCOUNTER — TELEPHONE (OUTPATIENT)
Dept: GASTROENTEROLOGY | Facility: CLINIC | Age: 39
End: 2019-05-09

## 2019-05-13 ENCOUNTER — TELEPHONE (OUTPATIENT)
Dept: ONCOLOGY | Facility: CLINIC | Age: 39
End: 2019-05-13

## 2019-05-13 NOTE — TELEPHONE ENCOUNTER
Demetri calls in wanting to connect with the thoracic team. She states that when she came to clinic back in February she did not see Dr. Brito, and has been awaiting a call from him. She would like an explanation for the ordered testing, because it will cost a lot out of pocket and would like to be sure it's all necessary before moving forward. She also notes that her symptoms have worsened since she was seen in clinic.    Will route to care team for follow up.    Jaclyn Powell RN

## 2019-05-14 ENCOUNTER — TELEPHONE (OUTPATIENT)
Dept: SURGERY | Facility: CLINIC | Age: 39
End: 2019-05-14

## 2019-05-14 NOTE — TELEPHONE ENCOUNTER
Call to Demetri who has questions about necessary testing needed to work up her esophageal symptoms. She had the esophageal manometry last week however, the pH testing will cost her $1000 (she has to pay out of pocket). She is not having any reflux. She wants to know if this is absolutely necessary before she spends this money.    Her symptoms are getting worse and she is very concerned and wants to get things moving as far as a treatment plan.

## 2019-05-16 ENCOUNTER — TELEPHONE (OUTPATIENT)
Dept: GASTROENTEROLOGY | Facility: CLINIC | Age: 39
End: 2019-05-16

## 2019-05-16 NOTE — TELEPHONE ENCOUNTER
: [x] N/A   [] Yes:  Language /  ID:       Patient scheduled for:  [x] EGD  [] Colonoscopy  [] EUS  [] Flex Sig   [x] Other: BRAVO    Indication for procedure. [] Screening   [x] GERD (gastroesophageal reflux disease)     Sedation Type: [x] Conscious Sedation   [] MAC    Procedure Provider:  Ruiz      Referring Provider. Nikolay Gregory    Arrival time verified: Thurs; 5/23/19; 1345    Facility location verified:   [x][address], 1st Floor, [address]  [address] [facility], 5th floor       Prep Type:   []Golytely eRx: ;  [] MoviPrep: , [] MiraLax: , [] Other:   [x]NPO /p [place], No solid food /p 2200 the night before    Anticoagulants or blood thinners: [x]None [] ASA 81mg  - may continue [] Warfarin  [] Warfarin + Lovenox bridge [] Plavix [] Effient [] Eliquis [] Xarelto  [] Brilinta [] NSAIDS  [] Other    LAST anticoagulant dose: Date/Time:   INR:     Electronic implanted devices: [x] No  [] IPG  []  ICD  []  LVAD  []     H&P / Pre op physical completed: [x] N/A, [] Complete, Date , [] Scheduled, Date , [] No,     Additional Information: Pt is seeing Dr. Brito Wed. 5/22/19 and may cancel following appointment.  She does not want this procedure and does not feel that it is necessary.  She did not wish to reschedule or postpone her procedure at this time.  Dr. Melchor appraised of this information via Inbox msg.    Pt has port and would like accessed for visit.     _______________________________________________      Instructions given: [x] Rec d & Read   [] Reviewed         [] Resent via [employer]     [] Resent via eMail (see below)     Pre procedure teaching completed: [x] Yes - Reviewed, [] No,     [x] No questions regarding Sedation as ordered, [x] Fentanyl has caused marked PONV in past.     Transportation from procedure & responsible adult to be with patient following procedure for a minimum of 6 hrs (Conscious Sedation) 24 hrs (MAC): [x] Yes   - confirmed will have post-procedure companionship as required, [] Pending , [] No     Ana Paula Vasquez RN  [facility]

## 2019-05-17 NOTE — TELEPHONE ENCOUNTER
Call to Demetri to let her know that Dr. Segura reviewed her manometry and her motility is actually good and no spasms were noted during the examination.    Dr. Segura would like her to have the 24 hour pH to evaluate for the presence of acid and/or bile reflux. If she does have this then Dr. Segura could prescribe biochelating agents to see if this resolved her symptoms.    She does NOT want to have to have her Nissen taken down because she has had absolutely no reflux since she had surgery and does not want to go back to how things were before the Nissen.    Her esophageal issues must be addressed prior to her being able to have her mastectomy reconstruction done as her plastic surgeon will not do this procedure unless she is able to take in enough protein to recover from reconstructive surgery.    Mary is scheduled to see Dr. Brito in clinic next week and will discuss all of her options with him at that time.

## 2019-05-21 NOTE — PROGRESS NOTES
THORACIC SURGERY NEW PATIENT VISIT     Dear Dr. Baeza,    I saw Ms. Mosley today in consultation at your request for the evaluation and treatment of dysphagia post Nissen fundoplication.      HPI  Matteo Fernandes is a 38 year-old female with history of Nissen fundoplication for hiatal hernia and GERD, now with dysphagia and retrosternal pain. She describes the pain as sharp and stabbing, worse at night, sometimes associated with swallowing pills. Her main symptoms prior to her operation were nausea, emesis, heartburn, regurgitation, and nocturnal aspiration, which have all resolved since her surgery. She mentions that her dysphagia feels like food getting stuck higher up in her esophagus, no aspiration events.      She is now s/p HRIM and returns to discuss results and next steps. She is currently pursuing mastectomy reconstruction and needs to take in enough protein to recover from surgery per her plastic surgeon in [place].      PREOP DIAGNOSIS   GERD and hiatal hernia.     PROCEDURE   12/23/2016: Laparoscopic Nissen Fundoplication, EGD (Dr. Parrish)  2/1/2017: EGD. Scope was advanced without resistance through esophagus, GEJ and pylorus. No evidence of stenosis, able to advance a 51 Fr savary dilator.      COMPLICATIONS  None     INTERVAL STUDIES  Manometry 5/8/2019  Normal study    Esophagram 2/5/19: Barium passes through esophagus and stomach, Nissen wrap intact. Pill gets stuck near aortic arch (similar to previous esophagram in 1/23/17), but clears with additional sips of liquids.           Albumin (5/22/2019): 4  Prealbumin (5/22/2019): 28    ETOH none  TOB former infrequent smoker  BMI 30     PMH  Rheumatoid arthritis   Hypertension   BRCA carrier   Asthma  Migraines  GERD  DVT (2009)  Anemia    PSH  Bilateral prophylactic mastectomy with reconstruction  Laparoscopic Nissen fundoplication (12/23/2016)  Hysterectomy  Cholecystectomy    Meds  Albuterol   Methotrexate 20 mg  weekly  Omalizumab  Tocilizumab monthly  Prednisone 7.5 mg daily  Hydrochlorothiazide       From a personal perspective, Ms. Dennison lives in [place], [place] with her spouse Oscar. She is studying to get a masters in public health and has two adult children.     IMPRESSION   (Z98.890) History of Nissen fundoplication  (primary encounter diagnosis)  (R13.10) Esophageal dysphagia    38 year-old female 2 years S/P hiatal hernia repair and Nissen fundoplication  Chronic dysphagia    Plan  I spent a total of 45 minutes with Mrs. Mosley, more than half were spent in counseling, coordination  Of care, and face-to-face time. I explained the plan as follows:    1) EGD with dilation in OR  2) Video swallow: to evaluate for Zenker's diverticulum  3) PAC         She had all her questions answered and was in agreement with the plan.  I appreciate the opportunity to participate in the care of your patient and will keep you updated.  Sincerely,

## 2019-05-22 ENCOUNTER — OFFICE VISIT (OUTPATIENT)
Dept: SURGERY | Facility: CLINIC | Age: 39
End: 2019-05-22
Attending: THORACIC SURGERY (CARDIOTHORACIC VASCULAR SURGERY)
Payer: MEDICAID

## 2019-05-22 VITALS
HEIGHT: 61 IN | DIASTOLIC BLOOD PRESSURE: 109 MMHG | WEIGHT: 163.1 LBS | BODY MASS INDEX: 30.79 KG/M2 | HEART RATE: 92 BPM | SYSTOLIC BLOOD PRESSURE: 145 MMHG | OXYGEN SATURATION: 99 % | TEMPERATURE: 98.5 F

## 2019-05-22 DIAGNOSIS — Z98.890 HISTORY OF NISSEN FUNDOPLICATION: Primary | ICD-10-CM

## 2019-05-22 DIAGNOSIS — R13.19 ESOPHAGEAL DYSPHAGIA: ICD-10-CM

## 2019-05-22 LAB
ALBUMIN SERPL-MCNC: 4 G/DL (ref 3.4–5)
PREALB SERPL IA-MCNC: 28 MG/DL (ref 15–45)

## 2019-05-22 PROCEDURE — G0463 HOSPITAL OUTPT CLINIC VISIT: HCPCS | Mod: ZF

## 2019-05-22 PROCEDURE — 82040 ASSAY OF SERUM ALBUMIN: CPT | Performed by: CLINICAL NURSE SPECIALIST

## 2019-05-22 PROCEDURE — 84134 ASSAY OF PREALBUMIN: CPT | Performed by: CLINICAL NURSE SPECIALIST

## 2019-05-22 ASSESSMENT — MIFFLIN-ST. JEOR: SCORE: 1357.2

## 2019-05-22 NOTE — NURSING NOTE
"Oncology Rooming Note    May 22, 2019 10:14 AM   Demetri Mosley is a 39 year old female who presents for:    Chief Complaint   Patient presents with     Oncology Clinic Visit     RETURN VISIT; CHRONIC GASTROESOPHAGEAL REFLUX DISEASE; VITALS COMPLETED BY [facility]      Initial Vitals: BP (!) 145/109   Pulse 92   Temp 98.5  F (36.9  C) (Oral)   Ht 1.549 m (5' 1\")   Wt 74 kg (163 lb 1.6 oz)   LMP 11/21/2001   SpO2 99%   BMI 30.82 kg/m   Estimated body mass index is 30.82 kg/m  as calculated from the following:    Height as of this encounter: 1.549 m (5' 1\").    Weight as of this encounter: 74 kg (163 lb 1.6 oz). Body surface area is 1.78 meters squared.  Data Unavailable Comment: Data Unavailable   Patient's last menstrual period was 11/21/2001.  Allergies reviewed: Yes  Medications reviewed: Yes    Medications: Medication refills not needed today.  Pharmacy name entered into [employer]:    [facility]  [facility] [address] [facility]  WRITTEN PRESCRIPTION REQUESTED  [facility] [address] [facility]  [facility] [address] [facility]  [facility] [id number] [place] [facility] [address]  OLIVIA MORALES [address] [place], [place] - [address] [facility]  [id number] - [place], [place] - [address]  [id number] [place], [place] - [address] [facility] [address]    Clinical concerns: Patient complains of the \"choking feeling\" in her upper chest.  Dr. Brito was notified.      Paulina Guerra              "

## 2019-05-22 NOTE — LETTER
5/22/2019      RE: Demetri Mosley  [address] [facility] [address]       THORACIC SURGERY NEW PATIENT VISIT     Dear Dr. Baeza,    I saw Ms. Mosley today in consultation at your request for the evaluation and treatment of dysphagia post Nissen fundoplication.      HPI  Matteo Fernandes is a 38 year-old female with history of Nissen fundoplication for hiatal hernia and GERD, now with dysphagia and retrosternal pain. She describes the pain as sharp and stabbing, worse at night, sometimes associated with swallowing pills. Her main symptoms prior to her operation were nausea, emesis, heartburn, regurgitation, and nocturnal aspiration, which have all resolved since her surgery. She mentions that her dysphagia feels like food getting stuck higher up in her esophagus, no aspiration events.      She is now s/p HRIM and returns to discuss results and next steps. She is currently pursuing mastectomy reconstruction and needs to take in enough protein to recover from surgery per her plastic surgeon in [place].      PREOP DIAGNOSIS   GERD and hiatal hernia.     PROCEDURE   12/23/2016: Laparoscopic Nissen Fundoplication, EGD (Dr. Parrish)  2/1/2017: EGD. Scope was advanced without resistance through esophagus, GEJ and pylorus. No evidence of stenosis, able to advance a 51 Fr savary dilator.      COMPLICATIONS  None     INTERVAL STUDIES  Manometry 5/8/2019  Normal study    Esophagram 2/5/19: Barium passes through esophagus and stomach, Nissen wrap intact. Pill gets stuck near aortic arch (similar to previous esophagram in 1/23/17), but clears with additional sips of liquids.           Albumin (5/22/2019): 4  Prealbumin (5/22/2019): 28    ETOH none  TOB former infrequent smoker  BMI 30     PMH  Rheumatoid arthritis   Hypertension   BRCA carrier   Asthma  Migraines  GERD  DVT (2009)  Anemia    PSH  Bilateral prophylactic mastectomy with reconstruction  Laparoscopic Nissen fundoplication  (12/23/2016)  Hysterectomy  Cholecystectomy    Meds  Albuterol   Methotrexate 20 mg weekly  Omalizumab  Tocilizumab monthly  Prednisone 7.5 mg daily  Hydrochlorothiazide       From a personal perspective, Ms. Dennison lives in [place], [place] with her spouse Oscar. She is studying to get a masters in public health and has two adult children.     IMPRESSION   (Z98.890) History of Nissen fundoplication  (primary encounter diagnosis)  (R13.10) Esophageal dysphagia    38 year-old female 2 years S/P hiatal hernia repair and Nissen fundoplication  Chronic dysphagia    Plan  I spent a total of 45 minutes with Mrs. Mosley, more than half were spent in counseling, coordination  Of care, and face-to-face time. I explained the plan as follows:    1) EGD with dilation in OR  2) Video swallow: to evaluate for Zenker's diverticulum  3) PAC         She had all her questions answered and was in agreement with the plan.  I appreciate the opportunity to participate in the care of your patient and will keep you updated.  Sincerely,      Noah Brito MD

## 2019-05-31 ENCOUNTER — TELEPHONE (OUTPATIENT)
Dept: SURGERY | Facility: CLINIC | Age: 39
End: 2019-05-31

## 2019-05-31 NOTE — TELEPHONE ENCOUNTER
Called patient to schedule procedure with Dr. Noah Brito, there was no answer.  Left message with my direct line [phone].

## 2019-06-07 ENCOUNTER — HOSPITAL ENCOUNTER (OUTPATIENT)
Dept: GENERAL RADIOLOGY | Facility: CLINIC | Age: 39
End: 2019-06-07
Attending: THORACIC SURGERY (CARDIOTHORACIC VASCULAR SURGERY)
Payer: COMMERCIAL

## 2019-06-07 ENCOUNTER — HOSPITAL ENCOUNTER (OUTPATIENT)
Dept: SPEECH THERAPY | Facility: CLINIC | Age: 39
Discharge: HOME | End: 2019-06-07
Attending: CLINICAL NURSE SPECIALIST | Admitting: CLINICAL NURSE SPECIALIST
Payer: COMMERCIAL

## 2019-06-07 DIAGNOSIS — Z98.890 HISTORY OF NISSEN FUNDOPLICATION: ICD-10-CM

## 2019-06-07 PROCEDURE — 92611 MOTION FLUOROSCOPY/SWALLOW: CPT | Mod: GN | Performed by: SPEECH-LANGUAGE PATHOLOGIST

## 2019-06-07 PROCEDURE — 74230 X-RAY XM SWLNG FUNCJ C+: CPT

## 2019-06-07 NOTE — PROGRESS NOTES
Impression:  Oral and pharyngeal stages of swallow are normal.  Swallow is timely and pharynx clears well for all consistencies.  No dysphagia noted on exam today.         Video Swallow Study  06/07/19    General Information   Type Of Visit Initial   Start Of Care Date 06/07/19   Referring Physician BERLIN Sloan   Orders Evaluate And Treat   Orders Comment Video Swallow Study   Medical Diagnosis Dysphagia; Post Nissen Fundoplication   Onset Of Illness/injury Or Date Of Surgery 05/22/19  (order date)   Precautions/limitations No Known Precautions/limitations   Hearing WFL for exam   Pertinent History of Current Problem/OT: Additional Occupational Profile Info Pt is referred for a video swallow study due to daily coughing on food/water.  Pt also states she can cough 'for no reason' such as when she is driving and food/liquid is not present.  PMH includes Nissen, seophageal dilation in 2017.  She feels occasional pain on left side of throat described as if a fishbone is stuck.  This has been present since Feb 2019.  Pt is frustrated with sensation, coughing, and trouble when swallowing.   Respiratory Status Room air   Prior Level Of Function Comment Pt eats a regular consistency diet.   Patient Role/employment History Employed   Living Environment [facility]   General Observations Pt alert and cooperative.  Able to state medical history.   Patient/family Goals To find out sourse of dysphagia symptoms.   Pain Assessment   Pain Reported Yes   Pain Scale 4/10  (at times )   Pain Comments Left side throat- described like a fishbone is stuck   Fall Risk Screen   Fall screen completed by SLP   Have you fallen 2 or more times in the past year? No   Have you fallen and had an injury in the past year? No   Is patient a fall risk? No   Clinical Swallow Evaluation   Oral Musculature generally intact   Structural Abnormalities none present   Dentition present and adequate   Mucosal Quality good   Mandibular Strength  and Mobility intact   Oral Labial Strength and Mobility WFL   Lingual Strength and Mobility WFL   Velar Elevation intact   Buccal Strength and Mobility intact   Laryngeal Function Cough;Throat clear;Swallow;Voicing initiated   VFSS Eval: Radiology   Radiologist Dr. Todd   Views Taken left lateral;A/P   Physical Location of Procedure [facility]   VFSS Eval: Thin Liquid Texture Trial   Mode of Presentation, Thin Liquid cup;straw;self-fed   Order of Presentation 1,2   Preparatory Phase WFL   Oral Phase, Thin Liquid WFL   Pharyngeal Phase, Thin Liquid WFL   Rosenbek's Penetration Aspiration Scale: Thin Liquid Trial Results 1 - no aspiration, contrast does not enter airway   Diagnostic Statement [facility]   VFSS Eval: Nectar Thick Liquid Texture Trial   Mode of Presentation, Nectar cup;self-fed   Order of Presentation 3   Preparatory Phase WFL   Oral Phase, Nectar WFL   Pharyngeal Phase, [place] WFL   Rosenbek's Penetration Aspiration Scale: Nectar-Thick Liquid Trial Results 1 - no aspiration, contrast does not enter airway   Diagnostic Statement [facility]   VFSS Eval: Pudding Thick Liquid Texture Trial   Mode of Presentation, Pudding spoon;self-fed   Order of Presentation 4   Preparatory Phase WFL   Oral Phase, Pudding WFL   Pharyngeal Phase, Pudding WFL   Rosenbek's Penetration Aspiration Scale: Pudding-Thick Liquid Trial Results 1 - no aspiration, contrast does not enter airway   Diagnostic Statement [facility]   VFSS Eval: Solid Food Texture Trial   Mode of Presentation, Solid self-fed   Order of Presentation 5,6   Preparatory Phase WFL   Oral Phase, Solid WFL   Pharyngeal Phase, Solid WFL   Rosenbek's Penetration Aspiration Scale: Solid Food Trial Results 1 - no aspiration, contrast does not enter airway   Diagnostic Statement [facility]   Swallow Compensations   Swallow Compensations No compensations were used   Educational Assessment   Barriers to Learning No barriers   Esophageal Phase of Swallow   Patient reports or  presents with symptoms of esophageal dysphagia Yes   Esophageal comments Pt had an esophagram 2/5/19 which was normal.   Swallow Eval: Clinical Impressions   Skilled Criteria for Therapy Intervention No problems identified which require skilled intervention   Functional Assessment Scale (FAS) 7   Dysphagia Outcome Severity Scale (CINTHIA) Level 7 - CINTHIA   Diet texture recommendations Regular diet   Clinical Impression Comments Oral and pharyngeal stages of swallow are normal.  Swallow is timely and pharynx clears well.  No dysphagia noted on exam today.     Total Session Time   SLP Eval: VideoFluoroscopic Swallow function Minutes (60422) 20

## 2019-06-14 NOTE — TELEPHONE ENCOUNTER
Called patient to schedule procedure with Dr. Noah Brito, there was no answer.  Left message with my direct line [phone].

## 2019-06-28 ENCOUNTER — TELEPHONE (OUTPATIENT)
Dept: SURGERY | Facility: CLINIC | Age: 39
End: 2019-06-28

## 2019-06-28 NOTE — TELEPHONE ENCOUNTER
Called patient to schedule procedure with Dr. Noah Brito, there was no answer.  Left message with my direct line [phone].

## 2019-09-29 ENCOUNTER — HEALTH MAINTENANCE LETTER (OUTPATIENT)
Age: 39
End: 2019-09-29

## 2021-01-14 ENCOUNTER — HEALTH MAINTENANCE LETTER (OUTPATIENT)
Age: 41
End: 2021-01-14

## 2021-03-31 ENCOUNTER — DOCUMENTATION ONLY (OUTPATIENT)
Dept: OTHER | Facility: CLINIC | Age: 41
End: 2021-03-31

## 2021-09-27 DIAGNOSIS — K21.9 GASTROESOPHAGEAL REFLUX DISEASE WITHOUT ESOPHAGITIS: Primary | ICD-10-CM

## 2021-10-24 ENCOUNTER — HEALTH MAINTENANCE LETTER (OUTPATIENT)
Age: 41
End: 2021-10-24

## 2021-11-21 ENCOUNTER — APPOINTMENT (OUTPATIENT)
Dept: URGENT CARE | Facility: CLINIC | Age: 41
End: 2021-11-21
Payer: COMMERCIAL

## 2022-01-13 ENCOUNTER — ANCILLARY PROCEDURE (OUTPATIENT)
Dept: GENERAL RADIOLOGY | Facility: CLINIC | Age: 42
End: 2022-01-13
Attending: CLINICAL NURSE SPECIALIST
Payer: COMMERCIAL

## 2022-01-13 DIAGNOSIS — K21.9 GASTROESOPHAGEAL REFLUX DISEASE WITHOUT ESOPHAGITIS: ICD-10-CM

## 2022-01-13 PROCEDURE — 74240 X-RAY XM UPR GI TRC 1CNTRST: CPT | Mod: GC | Performed by: RADIOLOGY

## 2022-02-13 ENCOUNTER — HEALTH MAINTENANCE LETTER (OUTPATIENT)
Age: 42
End: 2022-02-13

## 2022-03-02 DIAGNOSIS — R13.10 DYSPHAGIA: Primary | ICD-10-CM

## 2022-03-03 NOTE — TELEPHONE ENCOUNTER
Patient called saying she noticed a bulging on left lateral abdomen after moving something.   She said it is reducible and only mildly uncomfortable.   She wondered about seeing someone sooner than 1/20.         I offered her appointment Wed. 1/11 with Griselda Jerez.   I asked Rm Becerra PA-C, if any imaging would be helpful and he said no.   I tried to changed her UGI xray to the 11th, as well, but no time was available so she will still come 1/20 for imaging and then go home (cancelled appointment with me on that date).   I told her that other members of thoracic team will be around on Wednesday to take a look, if needed.   It may be an incisional hernia but will assess at appointment.   [FreeTextEntry1] : *Above discussed w Dr. Murrell\par \par -RTC per results of lab work

## 2022-03-08 NOTE — TELEPHONE ENCOUNTER
REFERRAL INFORMATION:    Referring Provider: EDEL De Paz     Referring Clinic:  [facility]     Reason for Visit/Diagnosis: Dysphagia      FUTURE VISIT INFORMATION:    Appointment Date: 2022    Appointment Time: 7:40 AM      NOTES STATUS DETAILS   OFFICE NOTE from Referring Provider Internal 19 Office visit with Dr. Noah Brito     OFFICE NOTE from Other Specialist Internal 19 Office visit with Dr. Zurdo Frias ([facility])     17 Office visit with EDEL Sloan ([facility])     16, 16, 16 Office visit with Dr. Isaias Blount (General Surgery)     12/16/15 Office visit with Dr. Angus Padgett ([facility])      HOSPITAL DISCHARGE SUMMARY/  ED VISITS Care Everywhere 19 ([facility])    OPERATIVE REPORT Received  Esophageal Motility Study: 19, 16  Reflux Monitorin16     MEDICATION LIST Internal         ENDOSCOPY  Internal Capsule: 16  EGD: 16   COLONOSCOPY Internal 16   ERCP N/A    EUS Internal 13   STOOL TESTING N/A    PERTINENT LABS Internal/ Care Everywhere    PATHOLOGY REPORTS (RELATED) Internal 16   IMAGING (CT, MRI, EGD, MRCP, Small Bowel Follow Through/SBT, MR/CT Enterography) Internal/ Care Everywhere XR Esophagram: 2022, 19  XR Video Swallow: 19  NM Gastric Emptying: 3/2/16    [facility]:  - CT Neck: 19     Action 5.23.22 MJ   Action Taken Requested CT neck 19     Action 5.24.22 MJ   Action Taken Pulled image into PACS

## 2022-04-06 ENCOUNTER — VIRTUAL VISIT (OUTPATIENT)
Dept: URGENT CARE | Facility: CLINIC | Age: 42
End: 2022-04-06
Payer: COMMERCIAL

## 2022-04-06 ENCOUNTER — HOME INFUSION (PRE-WILLOW HOME INFUSION) (OUTPATIENT)
Dept: PHARMACY | Facility: CLINIC | Age: 42
End: 2022-04-06

## 2022-04-06 DIAGNOSIS — J45.20 INTERMITTENT ASTHMA WITHOUT COMPLICATION, UNSPECIFIED ASTHMA SEVERITY: ICD-10-CM

## 2022-04-06 DIAGNOSIS — U07.1 INFECTION DUE TO 2019 NOVEL CORONAVIRUS: Primary | ICD-10-CM

## 2022-04-06 DIAGNOSIS — I10 ESSENTIAL HYPERTENSION, BENIGN: ICD-10-CM

## 2022-04-06 DIAGNOSIS — M06.9 RHEUMATOID ARTHRITIS, INVOLVING UNSPECIFIED SITE, UNSPECIFIED WHETHER RHEUMATOID FACTOR PRESENT (H): ICD-10-CM

## 2022-04-06 PROCEDURE — 99203 OFFICE O/P NEW LOW 30 MIN: CPT | Mod: 95 | Performed by: PHYSICIAN ASSISTANT

## 2022-04-06 NOTE — PROGRESS NOTES
Assessment:     COVID-19 positive patient.  Encounter for consideration of medication intervention.    Patient does qualify for a prescription.   Full discussion with patient including medication options, risks and benefits.   Potential drug interactions reviewed with patient.      Plan:  Treatment planned -      See patient instructions      Ana Hart PA-C  Virtual Urgent Care          Subjective      Demetri  is a 42 year old  who has a confirmed new positive COVID-19 diagnosis.    She has been identified as high risk for complications of this infection, and is being evaluated via a billable Video visit:     Video-Visit Details    Type of service:  Video Visit    Video Visit Start Time:9:32 AM    Video End Time:9:44 AM    Originating Location (pt. Location): Home    Distant Location (provider location):  [facility]     Platform used for Video Visit: Aliya    Concern for COVID-19  Exactly how many days ago did these symptoms start? 1 day ago     Are any of the following symptoms significant for you?  New or worsening difficulty breathing? Yes    Please describe what kind of difficulty you are having breathing:Mild dyspnea (able to do ADLs without difficulty, mild shortness of breath with activities such as climbing one or two flights of stairs or walking briskly)    Worsening cough? Yes, I am coughing up mucus.    Fever or chills? Yes, I felt feverish or had chills.    Headache: YES    Sore throat: YES    Chest pain: no    Diarrhea: no    Body aches? YES    What treatments has patient tried? Acetaminophen, Nonsteroidals, Cough syrup and albuterol inhaler   Does patient live in a nursing home, group home, or shelter? no  Does patient have a way to get food/medications during quarantined? Yes, I have a friend or family member who can help me.      MASSBP Score 4/6/2022   Age Greater than or equal to 65 years 0   BMI greater than or equal to 35 kg/m2 0   Has Diabetes Mellitus 0   Has  "Chronic Kidney Disease 0   Has Cardiovascular Disease and 55 years or older 0   Has Chronic Respiratory Disease and 55 years or older 0   Has Hypertension and 55 years or older 0   Is Immunocompromised 4   Is Pregnant 0   Member of BIPOC community (Black/, /, ,  or , or  or [place] Native)  2   MASSBP Score 6   Has the patient had a positive COVID test outside our system?  Yes   What day did symptoms start?  4/5/2022             Constitutional, HEENT, cardiovascular, pulmonary, gi and gu systems are negative, except as otherwise noted.    Objective    Vitals:  No vitals were obtained today due to virtual visit.  Estimated body mass index is 30.82 kg/m  as calculated from the following:    Height as of 5/22/19: 1.549 m (5' 1\").    Weight as of 5/22/19: 74 kg (163 lb 1.6 oz).   GENERAL: alert, no distress and fatigued  EYES: Eyes grossly normal to inspection.  No discharge or erythema, or obvious scleral/conjunctival abnormalities.  RESP: No audible wheeze, cough, or visible cyanosis.  No visible retractions or increased work of breathing.    SKIN: Visible skin clear. No significant rash, abnormal pigmentation or lesions.  NEURO: Cranial nerves grossly intact.  Mentation and speech appropriate for age.  PSYCH: Mentation appears normal, affect normal/bright, judgement and insight intact, normal speech and appearance well-groomed.  GFR Estimate   Date Value Ref Range Status   12/23/2016 78 >60 mL/min/1.7m2 Final     Comment:     Non  GFR Calc                "

## 2022-05-23 ENCOUNTER — TELEPHONE (OUTPATIENT)
Dept: PHARMACY | Facility: CLINIC | Age: 42
End: 2022-05-23
Payer: COMMERCIAL

## 2022-05-23 NOTE — TELEPHONE ENCOUNTER
Patient at outside PCP office, will write orders for [facility]. Gave patient scheduling line, unsure on where patient should fax number- she will call to schedule and obtain fax #.

## 2022-05-23 NOTE — TELEPHONE ENCOUNTER
[facility] Call Center    Phone Message    May a detailed message be left on voicemail: yes     Reason for Call: Other: Patient calling former patient of Dr. Orr dealing with not having any antibodies for covid after having 4 vaccinations. Patient is unsure what to do or who to see, Her PCP told her that she has to go to a clinic at [place] get the medication for antibodies but she is unsure where to get that at. Niall call to discuss thank you.       Action Taken: Message routed to:  [facility] ([facility] inf. dis    Travel Screening: Not Applicable

## 2022-05-24 ENCOUNTER — PRE VISIT (OUTPATIENT)
Dept: GASTROENTEROLOGY | Facility: CLINIC | Age: 42
End: 2022-05-24

## 2022-05-24 ENCOUNTER — VIRTUAL VISIT (OUTPATIENT)
Dept: GASTROENTEROLOGY | Facility: CLINIC | Age: 42
End: 2022-05-24
Attending: CLINICAL NURSE SPECIALIST
Payer: COMMERCIAL

## 2022-05-24 DIAGNOSIS — Z98.890 HISTORY OF NISSEN FUNDOPLICATION: ICD-10-CM

## 2022-05-24 DIAGNOSIS — R09.A2 GLOBUS SENSATION: Primary | ICD-10-CM

## 2022-05-24 DIAGNOSIS — R13.19 ESOPHAGEAL DYSPHAGIA: ICD-10-CM

## 2022-05-24 DIAGNOSIS — G89.4 CHRONIC PAIN SYNDROME: ICD-10-CM

## 2022-05-24 PROCEDURE — 99205 OFFICE O/P NEW HI 60 MIN: CPT | Mod: 95 | Performed by: INTERNAL MEDICINE

## 2022-05-24 RX ORDER — FAMOTIDINE 10 MG
10 TABLET ORAL DAILY
COMMUNITY

## 2022-05-24 NOTE — LETTER
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 Johns with a chief complaint of globus, and dysphagia.     The patient was seen in video telemedicine consultation regarding her symptoms of globus and dysphagia.  This has been a chronic ongoing problem for the past 4 to 5 years.  Is unclear what the underlying etiology is however it could be related to a neuropathic process.  Another possible explanation is that she has some degree of reflux despite her history of Nissen fundoplication.  I think it is less likely that she has something such as eosinophilic esophagitis however this is in the differential.    In order to evaluate her symptoms we will plan to perform an upper endoscopy with biopsy of the distal and  proximal esophagus.  If there is any narrowing a gentle dilation will be performed.  Following endoscopy have asked that she begin taking PPI twice daily to determine if reflux is playing a role.  She has noted that there is an improvement in her symptoms following stellate ganglion injections which provide some degree of evidence that there is a neuropathic process at play.  She was asked to continue to monitor her response to these injections.  Depending on the findings on endoscopy as well as response to PPI twice daily she may benefit from neuromodulation.  Unfortunately, TCAs were too sedating and she requires a high degree of function for her career making this a suboptimal choice.  Other neuromodulators may be trialed going forward.    1. Globus sensation    2. Esophageal dysphagia    3. Chronic pain syndrome    4. History of Nissen fundoplication       Orders Placed This Encounter   Procedures     Adult Gastro Ref - Procedure Only      Plan:  1. We will plan to perform an upper endoscopy with biopsy for eosinophilic esophagitis and possibly other structural etiologies to explain your symptoms. If there is a narrowing a dilation can be performed.  2. Continue to monitor your symptoms post stellate ganglion injection for resolution  3. Following the upper endoscopy please begin taking high dose PPI twice daily to see if this helps with your symptoms  4. 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    The risks and benefits of my recommendations, as well as other treatment options were discussed with the patient and any available family today. All questions were answered.     o Follow up: As planned above. Today, I personally spent  39 minutes in direct face to face time with the patient, of which greater than 50% of the time was spent in patient education and counseling as described above. Approximately 15 minutes were spent on indirect care associated with the patient's consultation including but not limited to review of: patient medical records to date, clinic visits, hospital records, lab results, imaging studies, procedural documentation, and coordinating care with other providers. The findings from this review are summarized in the above note. All of the above accounted for a cumulative time of 40 minutes and was performed on the date of service.     The patient verbalized understanding of the plan and was appreciative for the time spent and information provided during the office visit.     Author:   Tejas Orourke DO   of Medicine  Director, Esophageal Disorders Program  Division of Gastroenterology, Hepatology, and Nutrition  [facility]     Documentation assisted by voice recognition and documentation system.

## 2022-05-24 NOTE — PROGRESS NOTES
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 - 7/16/15:               Controlled Substance Agreement - Scan on 7/24/2015 12:17 PM : CONTROLLED MEDICATION AGREEMENT  07/16/2015 (below)            Pain Clinic evaluation in the past: No    DIRE Total Score(s):  No flowsheet data found.    Last [facility] website verification:  none   [email or web address]           B-complex deficiency 09/27/2011     Priority: Medium     Does self monthly B12 injections at home        Assessment and Plan   Assessment:    Demetri Mosley is a 42 year old female with allergies, GI issues forever, RA, 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 Johns with a chief complaint of globus, and dysphagia.     The patient was seen in video telemedicine consultation regarding her symptoms of globus and dysphagia.  This has been a chronic ongoing problem for the past 4 to 5 years.  Is unclear what the underlying etiology is however it could be related to a neuropathic process.  Another possible explanation is that she has some degree of  reflux despite her history of Nissen fundoplication.  I think it is less likely that she has something such as eosinophilic esophagitis however this is in the differential.    In order to evaluate her symptoms we will plan to perform an upper endoscopy with biopsy of the distal and proximal esophagus.  If there is any narrowing a gentle dilation will be performed.  Following endoscopy have asked that she begin taking PPI twice daily to determine if reflux is playing a role.  She has noted that there is an improvement in her symptoms following stellate ganglion injections which provide some degree of evidence that there is a neuropathic process at play.  She was asked to continue to monitor her response to these injections.  Depending on the findings on endoscopy as well as response to PPI twice daily she may benefit from neuromodulation.  Unfortunately, TCAs were too sedating and she requires a high degree of function for her career making this a suboptimal choice.  Other neuromodulators may be trialed going forward.    1. Globus sensation    2. Esophageal dysphagia    3. Chronic pain syndrome    4. History of Nissen fundoplication       Orders Placed This Encounter   Procedures     Adult Gastro Ref - Procedure Only      Plan:  1. We will plan to perform an upper endoscopy with biopsy for eosinophilic esophagitis and possibly other structural etiologies to explain your symptoms. If there is a narrowing a dilation can be performed.  2. Continue to monitor your symptoms post stellate ganglion injection for resolution  3. Following the upper endoscopy please begin taking high dose PPI twice daily to see if this helps with your symptoms  4. 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    The risks and benefits of my recommendations, as well as other treatment options were discussed with the patient and any available family today. All questions were answered.     o Follow up: As planned above. Today, I personally spent 39 minutes in direct face to face time with the patient, of which greater than 50% of the time was spent in patient education and counseling as described above. Approximately 15 minutes were spent on indirect care associated with the patient's consultation including but not limited to review of: patient medical records to date, clinic visits, hospital records, lab results, imaging studies, procedural documentation, and coordinating care with other providers. The findings from this review are summarized in the above note. All of the above accounted for a cumulative time of 40 minutes and was performed on the date of service.     The patient verbalized understanding of the plan and was appreciative for the time spent and information provided during the office visit.     Author:   Tejas Orourke DO   of Medicine  Director, Esophageal Disorders Program  Division of Gastroenterology, Hepatology, and Nutrition  [facility]     Documentation assisted by voice recognition and documentation system.

## 2022-05-24 NOTE — PATIENT INSTRUCTIONS
It was a pleasure taking care of you today.  I've included a brief summary of our discussion and care plan from today's visit below.  Please review this information with your primary care provider.  _______________________________________________________________________    My recommendations are summarized as follows:    We will plan to perform an upper endoscopy with biopsy for eosinophilic esophagitis and possibly other structural etiologies to explain your symptoms. If there is a narrowing a dilation can be performed.  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  If you were seen through the [facility] please feel free to reach out to Kiana at [phone]   --    Return to GI Clinic in 4 months to review your progress.    _______________________________________________________________________    Who do I call with any questions after my visit?  Please be in touch if there are any further questions that arise following today's visit.  There are multiple ways to contact your gastroenterology care team.      During business hours, you may reach a Gastroenterology nurse at [phone] and choose option 3.       To schedule or reschedule an appointment, please call [phone].     You can always send a secure message  through Sova.  Sova messages are answered by your nurse or doctor typically within 24 hours.  Please allow extra time on weekends and holidays.      For urgent/emergent questions after business hours, you may reach the on-call GI Fellow by contacting the [facility]  at [phone].     How will I get the results of any tests ordered?    You will receive all of your results.  If you have signed up for [employer], any tests ordered at your visit will be available to you after your physician reviews them.  Typically this takes 1-2 weeks.  If there are urgent results that require a change in your care plan, your physician or nurse will call you to discuss the next steps.      What is Sova?  Sova is a secure way for you to access all of your healthcare records from the [facility].  It is a web based computer program, so you can sign on to it from any location.  It also allows you to send secure messages to your care team.  I recommend signing up for Sova access if you have not already done so and are comfortable with using a computer.      How to I schedule a follow-up visit?  If you did not schedule a follow-up visit today, please call [phone] to schedule a follow-up office visit.      If you feel you received exceptional care and are interested in supporting the clinical and research goals of Dr. Orourke or the Division of Gastroenterology, Hepatology, and Nutrition please contact [email or web address] from the [facility] to discuss opportunities to donate.    Sincerely,    Tejas Orourke DO   of Medicine  Director, Esophageal Disorders Program  Division of Gastroenterology, Hepatology, and Nutrition  [facility]

## 2022-05-25 ENCOUNTER — TELEPHONE (OUTPATIENT)
Dept: GASTROENTEROLOGY | Facility: CLINIC | Age: 42
End: 2022-05-25
Payer: COMMERCIAL

## 2022-05-25 PROBLEM — Z98.890 HISTORY OF NISSEN FUNDOPLICATION: Status: ACTIVE | Noted: 2022-05-25

## 2022-05-25 PROBLEM — R13.19 ESOPHAGEAL DYSPHAGIA: Status: ACTIVE | Noted: 2022-05-25

## 2022-05-25 PROBLEM — R09.A2 GLOBUS SENSATION: Status: ACTIVE | Noted: 2022-05-25

## 2022-05-25 NOTE — TELEPHONE ENCOUNTER
Screening Questions  BlueKIND OF PREP RedLOCATION [review exclusion criteria] GreenSEDATION TYPE  1. Are you active on mychart? y    2. Ordering/Referring Provider: haylee    3. What insurance is in the chart? medica     4. Do you have a legal guardian or medical Power of ?  Are you able to give consent for your medical care? n   (Sedation review/consideration needed)    3.   BMI 30.8 [BMI OVER 40-EXTENDED PREP]  If greater than 40 review exclusion criteria [PAC APPT IF @ [facility]]      4. Have you had a positive covid test in the last 90 days? yes  End of march    5.  Respiratory Screening :  [If yes to any of the following HOSPITAL setting only]     Do you use daily home oxygen? n  Do you have mod to severe Obstructive Sleep Apnea? n [OKAY @ [facility] SH PH RI]   Do you have Pulmonary Hypertension? n   Do you have UNCONTROLLED asthma? n      6.   Have you had a heart or lung transplant? n      7.   Are you currently on dialysis? n [ If yes, G-PREP & HOSPITAL setting only]     8.   Do you have chronic kidney disease? n[ If yes, G-PREP ]    9.   Have you had a stroke or Transient ischemic attack (TIA - aka  mini stroke ) within 6 months?  n(If yes, please review exclusion criteria)    10.   In the past 6 months, have you had any heart related issues including cardiomyopathy or heart attack? nn          If yes, did it require cardiac stenting or other implantable device? n      11.   Do you have any implantable devices in your body (pacemaker, defib, LVAD)? Port L chest (If yes, please review exclusion criteria)    12.   Do you take nitroglycerin? n           If yes, how often? n  (if yes, HOSPITAL setting ONLY)    13.   Are you currently taking any blood thinners? n           [IF YES, INFORM PATIENT TO FOLLOW UP W/ ORDERING PROVIDER FOR BRIDGING INSTRUCTIONS]     14.   Do you have a diagnosis of diabetes? n - on metformin[ If yes, G-PREP ]    15.   [FEMALES] Are you currently pregnant? n    If yes, how many  weeks? n    16.   Are you taking any prescription pain medications on a routine schedule?  n [ If yes, EXTENDED PREP.] [If yes, MAC]    17.   Do you have any chemical dependencies such as alcohol, street drugs, or methadone?  n [If yes, MAC]    18.   Do you have any history of post-traumatic stress syndrome, severe anxiety or history of psychosis?  n [If yes, MAC]    19.   Do you transfer independently?  y    20.  On a regular basis do you go 3-5 days between bowel movements? n [ If yes, EXTENDED PREP.]    21.   Preferred LOCAL Pharmacy for Pre Prescription        [facility] - [place], [place] - [address]        Scheduling Details      Caller :  Demetri   (Please ask for phone number if not scheduled by patient)    Type of Procedure Scheduled: Upper Endoscopy [EGD]  Which Colonoscopy Prep was Sent?:     Surgeon: haylee  Date of Procedure: 08/08  [place] [facility]    Sedation Type: mac    Conscious Sedation- Needs  for 6 hours after the procedure  MAC/General-Needs  for 24 hours after procedure    Pre-op Required at [facility] [place], [place] and OR for MAC sedation: n  (advise patient they will need a pre-op prior to procedure -)      Informed patient they will need an adult  y  Cannot take any type of public or medical transportation alone    [facility]Pro[facility] or Externally: an    Confirmed Nurse will call to complete assessment y    Additional comments:

## 2022-06-01 ENCOUNTER — INFUSION THERAPY VISIT (OUTPATIENT)
Dept: NURSING | Facility: CLINIC | Age: 42
End: 2022-06-01
Payer: COMMERCIAL

## 2022-06-01 DIAGNOSIS — Z29.89 PROPHYLACTIC IMMUNOTHERAPY: Primary | ICD-10-CM

## 2022-06-01 PROCEDURE — 96372 THER/PROPH/DIAG INJ SC/IM: CPT | Performed by: FAMILY MEDICINE

## 2022-06-01 NOTE — PROGRESS NOTES
EVUSHELD Administration Note:  Demetri Mosley presents today for EVUSHELD.   present during visit today: Not Applicable.    Consent:   Evusheld Consent   Confirmed patient received the Emergency Use Authorization Fact Sheet for Patients, Parents and Caregivers prior to receiving medication. We discussed the following risks and benefits of receiving EVUSHELD, as well as alternative treatments and the patient wished to proceed with EVUSHELD.     Providers believe the benefits of Evusheld outweigh the risks for all moderately to severely immunocompromised patients.      Benefits:   Evusheld is a synthetic antibody that provides protection against COVID-19 for 6 months and is recommended for patients that have a weakened immune system that may not be able to make antibodies themselves.     Risks:    There is a very small risk of allergic reactions and you should notify us if you have any symptoms of an allergic reaction after the injection. There were also some extremely rare cardiac events reported in the initial trials in people that already had heart disease, but experts do not think these were caused by the medication. We will observe you for any chest pain or trouble breathing after the injections and you can reach out to your provider if any of these symptoms develop.     Alternatives:   Vaccines to prevent COVID-19 are approved or available under Emergency Use Authorization. Use of EVUSHELD does not replace vaccination against COVID-19. You can continue to mask and isolate to avoid infections. It is your choice to receive or not receive EVUSHELD. Should you decide not to receive EVUSHELD, it will not change your standard medical care.     Patient does consent to the injection.        Post Injection Assessment:   Patient tolerated injection without incident.  Patient observed for 60 minutes post injection per protocol.      Patient was observed in the room for a minimum of 10 minutes after injection  per standard, then remained in the buidling for a total 60 minute observation period.         Discharge Plan:    Patient and/or family verbalized understanding of discharge instructions and all questions answered.  Patient discharged in stable condition accompanied by: zunilda Méndez RN BSN  [facility]

## 2022-07-21 ENCOUNTER — TELEPHONE (OUTPATIENT)
Dept: SURGERY | Facility: CLINIC | Age: 42
End: 2022-07-21

## 2022-07-21 NOTE — TELEPHONE ENCOUNTER
Patient is scheduled for a consult with Dr. Bowden tomorrow, 7/22/22 at 8:30 am.      Attempted to reach patient to confirm reason for visit and to ensure patient is aware of the location of her appointment.    Left a generic voicemail for patient to return a call to the clinic.  Will send [employer] message as well.

## 2022-08-02 ENCOUNTER — TELEPHONE (OUTPATIENT)
Dept: GASTROENTEROLOGY | Facility: CLINIC | Age: 42
End: 2022-08-02

## 2022-08-02 ENCOUNTER — PATIENT OUTREACH (OUTPATIENT)
Dept: GASTROENTEROLOGY | Facility: CLINIC | Age: 42
End: 2022-08-02

## 2022-08-02 DIAGNOSIS — Z01.812 PRE-PROCEDURE LAB EXAM: Primary | ICD-10-CM

## 2022-08-02 NOTE — PROGRESS NOTES
Rec'd voicemail from pt with questions about taking medications prior to her endoscopy.       Left message for pt with recommendations for Tums and gaviscon for symptom relief as needed. Recommended to avoid foods that may cause symptoms including avoiding eating too close to when she wants to lay down. Also recommended to avoid any acid reducing medication one day prior to procedure and limit use of tums/gaviscon as much as she can.

## 2022-08-02 NOTE — TELEPHONE ENCOUNTER
Attempted to contact patient regarding upcoming EGD procedure on 8.8.2022 for pre assessment questions. Pt is unable to talk at this time.  [employer] message was sent.    Discuss at home rapid antigen COVID test 1-2 days prior to procedure.    Arrival time: 0700    Facility location: [facility]    Sedation type: MAC    Indication for procedure: Dysphagia, globus    Ilene Flores RN

## 2022-08-03 NOTE — TELEPHONE ENCOUNTER
Pre assessment questions completed for upcoming EGD procedure scheduled on 8.8.2022    Discussed at home rapid antigen COVID test 1-2 days prior to procedure.    Reviewed procedural arrival time 0700 and facility location [facility].    Designated  policy reviewed. Instructed to have someone stay 24 hours post procedure.     Anticoagulation/blood thinners? no    Electronic implanted devices? no    Reviewed EGD prep instructions with patient.    Patient verbalized understanding and had no questions or concerns at this time.    Ilene Flores RN

## 2022-08-08 ENCOUNTER — ANESTHESIA (OUTPATIENT)
Dept: SURGERY | Facility: AMBULATORY SURGERY CENTER | Age: 42
End: 2022-08-08
Payer: COMMERCIAL

## 2022-08-08 ENCOUNTER — ANESTHESIA EVENT (OUTPATIENT)
Dept: SURGERY | Facility: AMBULATORY SURGERY CENTER | Age: 42
End: 2022-08-08
Payer: COMMERCIAL

## 2022-08-08 ENCOUNTER — HOSPITAL ENCOUNTER (OUTPATIENT)
Facility: AMBULATORY SURGERY CENTER | Age: 42
Discharge: HOME | End: 2022-08-08
Attending: INTERNAL MEDICINE
Payer: COMMERCIAL

## 2022-08-08 VITALS
SYSTOLIC BLOOD PRESSURE: 102 MMHG | WEIGHT: 163 LBS | RESPIRATION RATE: 16 BRPM | OXYGEN SATURATION: 99 % | BODY MASS INDEX: 30.78 KG/M2 | TEMPERATURE: 97.4 F | DIASTOLIC BLOOD PRESSURE: 59 MMHG | HEIGHT: 61 IN | HEART RATE: 98 BPM

## 2022-08-08 VITALS — HEART RATE: 92 BPM

## 2022-08-08 DIAGNOSIS — R09.A2 GLOBUS SENSATION: Primary | ICD-10-CM

## 2022-08-08 DIAGNOSIS — R13.19 ESOPHAGEAL DYSPHAGIA: ICD-10-CM

## 2022-08-08 LAB — UPPER GI ENDOSCOPY: NORMAL

## 2022-08-08 PROCEDURE — 88305 TISSUE EXAM BY PATHOLOGIST: CPT | Mod: TC | Performed by: INTERNAL MEDICINE

## 2022-08-08 PROCEDURE — 88305 TISSUE EXAM BY PATHOLOGIST: CPT | Mod: 26 | Performed by: PATHOLOGY

## 2022-08-08 PROCEDURE — 43239 EGD BIOPSY SINGLE/MULTIPLE: CPT

## 2022-08-08 RX ORDER — NALOXONE HYDROCHLORIDE 0.4 MG/ML
0.4 INJECTION, SOLUTION INTRAMUSCULAR; INTRAVENOUS; SUBCUTANEOUS
Status: DISCONTINUED | OUTPATIENT
Start: 2022-08-08 | End: 2022-08-09 | Stop reason: HOSPADM

## 2022-08-08 RX ORDER — PROPOFOL 10 MG/ML
INJECTION, EMULSION INTRAVENOUS CONTINUOUS PRN
Status: DISCONTINUED | OUTPATIENT
Start: 2022-08-08 | End: 2022-08-08

## 2022-08-08 RX ORDER — ONDANSETRON 2 MG/ML
4 INJECTION INTRAMUSCULAR; INTRAVENOUS EVERY 6 HOURS PRN
Status: DISCONTINUED | OUTPATIENT
Start: 2022-08-08 | End: 2022-08-09 | Stop reason: HOSPADM

## 2022-08-08 RX ORDER — LIDOCAINE HYDROCHLORIDE 20 MG/ML
INJECTION, SOLUTION INFILTRATION; PERINEURAL PRN
Status: DISCONTINUED | OUTPATIENT
Start: 2022-08-08 | End: 2022-08-08

## 2022-08-08 RX ORDER — GLYCOPYRROLATE 0.2 MG/ML
INJECTION, SOLUTION INTRAMUSCULAR; INTRAVENOUS PRN
Status: DISCONTINUED | OUTPATIENT
Start: 2022-08-08 | End: 2022-08-08

## 2022-08-08 RX ORDER — ONDANSETRON 4 MG/1
4 TABLET, ORALLY DISINTEGRATING ORAL EVERY 6 HOURS PRN
Status: DISCONTINUED | OUTPATIENT
Start: 2022-08-08 | End: 2022-08-09 | Stop reason: HOSPADM

## 2022-08-08 RX ORDER — SODIUM CHLORIDE, SODIUM LACTATE, POTASSIUM CHLORIDE, CALCIUM CHLORIDE 600; 310; 30; 20 MG/100ML; MG/100ML; MG/100ML; MG/100ML
INJECTION, SOLUTION INTRAVENOUS CONTINUOUS PRN
Status: DISCONTINUED | OUTPATIENT
Start: 2022-08-08 | End: 2022-08-08

## 2022-08-08 RX ORDER — NALOXONE HYDROCHLORIDE 0.4 MG/ML
0.2 INJECTION, SOLUTION INTRAMUSCULAR; INTRAVENOUS; SUBCUTANEOUS
Status: DISCONTINUED | OUTPATIENT
Start: 2022-08-08 | End: 2022-08-09 | Stop reason: HOSPADM

## 2022-08-08 RX ORDER — LOSARTAN POTASSIUM 50 MG/1
50 TABLET ORAL DAILY
COMMUNITY
End: 2024-04-08

## 2022-08-08 RX ORDER — PANTOPRAZOLE SODIUM 40 MG/1
40 TABLET, DELAYED RELEASE ORAL DAILY
Qty: 90 TABLET | Refills: 3 | Status: SHIPPED | OUTPATIENT
Start: 2022-08-08 | End: 2023-08-08

## 2022-08-08 RX ORDER — HEPARIN SODIUM,PORCINE 10 UNIT/ML
5-10 VIAL (ML) INTRAVENOUS
Status: DISCONTINUED | OUTPATIENT
Start: 2022-08-08 | End: 2022-08-09 | Stop reason: HOSPADM

## 2022-08-08 RX ORDER — LIDOCAINE 40 MG/G
CREAM TOPICAL
Status: DISCONTINUED | OUTPATIENT
Start: 2022-08-08 | End: 2022-08-08 | Stop reason: HOSPADM

## 2022-08-08 RX ORDER — PROPOFOL 10 MG/ML
INJECTION, EMULSION INTRAVENOUS PRN
Status: DISCONTINUED | OUTPATIENT
Start: 2022-08-08 | End: 2022-08-08

## 2022-08-08 RX ORDER — HEPARIN SODIUM,PORCINE 10 UNIT/ML
5-10 VIAL (ML) INTRAVENOUS EVERY 24 HOURS
Status: DISCONTINUED | OUTPATIENT
Start: 2022-08-08 | End: 2022-08-09 | Stop reason: HOSPADM

## 2022-08-08 RX ORDER — PROCHLORPERAZINE MALEATE 10 MG
10 TABLET ORAL EVERY 6 HOURS PRN
Status: DISCONTINUED | OUTPATIENT
Start: 2022-08-08 | End: 2022-08-09 | Stop reason: HOSPADM

## 2022-08-08 RX ORDER — HEPARIN SODIUM (PORCINE) LOCK FLUSH IV SOLN 100 UNIT/ML 100 UNIT/ML
5-10 SOLUTION INTRAVENOUS
Status: DISCONTINUED | OUTPATIENT
Start: 2022-08-08 | End: 2022-08-09 | Stop reason: HOSPADM

## 2022-08-08 RX ORDER — ONDANSETRON 2 MG/ML
4 INJECTION INTRAMUSCULAR; INTRAVENOUS
Status: DISCONTINUED | OUTPATIENT
Start: 2022-08-08 | End: 2022-08-08 | Stop reason: HOSPADM

## 2022-08-08 RX ORDER — FLUMAZENIL 0.1 MG/ML
0.2 INJECTION, SOLUTION INTRAVENOUS
Status: ACTIVE | OUTPATIENT
Start: 2022-08-08 | End: 2022-08-08

## 2022-08-08 RX ADMIN — PROPOFOL 20 MG: 10 INJECTION, EMULSION INTRAVENOUS at 08:31

## 2022-08-08 RX ADMIN — GLYCOPYRROLATE 0.2 MG: 0.2 INJECTION, SOLUTION INTRAMUSCULAR; INTRAVENOUS at 08:29

## 2022-08-08 RX ADMIN — PROPOFOL 40 MG: 10 INJECTION, EMULSION INTRAVENOUS at 08:28

## 2022-08-08 RX ADMIN — PROPOFOL 200 MCG/KG/MIN: 10 INJECTION, EMULSION INTRAVENOUS at 08:29

## 2022-08-08 RX ADMIN — PROPOFOL 40 MG: 10 INJECTION, EMULSION INTRAVENOUS at 08:33

## 2022-08-08 RX ADMIN — SODIUM CHLORIDE, SODIUM LACTATE, POTASSIUM CHLORIDE, CALCIUM CHLORIDE: 600; 310; 30; 20 INJECTION, SOLUTION INTRAVENOUS at 08:25

## 2022-08-08 RX ADMIN — LIDOCAINE HYDROCHLORIDE 80 MG: 20 INJECTION, SOLUTION INFILTRATION; PERINEURAL at 08:28

## 2022-08-08 NOTE — ANESTHESIA POSTPROCEDURE EVALUATION
Patient: Demetri Mosley    Procedure: Procedure(s):  ESOPHAGOGASTRODUODENOSCOPY, WITH BIOPSY       Anesthesia Type:  MAC    Note:  Disposition: Outpatient   Postop Pain Control: Uneventful            Sign Out: Well controlled pain   PONV: No   Neuro/Psych: Uneventful            Sign Out: Acceptable/Baseline neuro status   Airway/Respiratory: Uneventful            Sign Out: Acceptable/Baseline resp. status   CV/Hemodynamics: Uneventful            Sign Out: Acceptable CV status; No obvious hypovolemia; No obvious fluid overload   Other NRE: NONE   DID A NON-ROUTINE EVENT OCCUR? No           Last vitals:  Vitals Value Taken Time   /59 08/08/22 0900   Temp 36.3  C (97.4  F) 08/08/22 0900   Pulse 98 08/08/22 0900   Resp 16 08/08/22 0900   SpO2 99 % 08/08/22 0900       Electronically Signed By: Cole Negron MD  August 8, 2022  10:30 AM

## 2022-08-08 NOTE — ANESTHESIA CARE TRANSFER NOTE
Patient: Demetri Mosley    Procedure: Procedure(s):  ESOPHAGOGASTRODUODENOSCOPY, WITH BIOPSY       Diagnosis: Dysphagia, unspecified type [R13.10]  Globus sensation [R19.8]  Diagnosis Additional Information: No value filed.    Anesthesia Type:   MAC     Note:    Oropharynx: oropharynx clear of all foreign objects  Level of Consciousness: awake  Oxygen Supplementation: room air    Independent Airway: airway patency satisfactory and stable  Dentition: dentition unchanged  Vital Signs Stable: post-procedure vital signs reviewed and stable  Report to RN Given: handoff report given  Patient transferred to: Phase II  Comments: Vital signs per nursing documentation.   Stable at transfer.   Handoff Report: Identifed the Patient, Identified the Reponsible Provider, Reviewed the pertinent medical history, Discussed the surgical course, Reviewed Intra-OP anesthesia mangement and issues during anesthesia, Set expectations for post-procedure period and Allowed opportunity for questions and acknowledgement of understanding      Vitals:  Vitals Value Taken Time   BP     Temp     Pulse     Resp     SpO2         Electronically Signed By: EDEL Moon CRNA  August 8, 2022  8:47 AM

## 2022-08-08 NOTE — H&P
Demetri Mosley  [id number]  female  42 year old      Reason for procedure/surgery: egd, dysphagia    Patient Active Problem List   Diagnosis     H/O personality disorder     Attention deficit disorder     B-complex deficiency     Chronic pain syndrome     Persistent vomiting     Rheumatoid arthritis (H)     Spasm of sphincter of Oddi     Enthesopathy of hip region     Cyclic vomiting syndrome     Essential hypertension, benign     Urticaria     Intermittent asthma     Gastroesophageal reflux disease without esophagitis     Anxiety     Neck pain     CARDIOVASCULAR SCREENING; LDL GOAL LESS THAN 130     Shift work sleep disorder     Vitamin D deficiency     Portacath in place     House dust mite allergy     Allergic rhinitis due to animal dander     Seasonal allergic rhinitis     Diagnostic skin and sensitization tests (aka ALLERGENS)     Mastocytosis     TOS (thoracic outlet syndrome)     Primary hypercoagulable state [289.81]     Vomiting     Brachial neuritis or radiculitis     Pain of left hand     Mechanical problems with limbs     Hand weakness     Finger stiffness, left     Anemia, iron deficiency     Pyelonephritis due to Escherichia coli     Bacteremia     Encounter for blood transfusion     Fever, intermittent     History of recurrent urinary tract infection     Chronic urticaria     Chronic gastroesophageal reflux disease     GERD (gastroesophageal reflux disease)     Esophageal dysphagia     Globus sensation     History of Nissen fundoplication       Past Surgical History:    Past Surgical History:   Procedure Laterality Date     CHOLECYSTECTOMY       COLONOSCOPY N/A 6/2/2016    Procedure: COLONOSCOPY;  Surgeon: Edvin Summers MD;  Location:  GI     ENT SURGERY      T&A     ESOPHAGOSCOPY, GASTROSCOPY, DUODENOSCOPY (EGD), COMBINED N/A 6/2/2016    Procedure: COMBINED ESOPHAGOSCOPY, GASTROSCOPY, DUODENOSCOPY (EGD), BIOPSY SINGLE OR MULTIPLE;  Surgeon: Edvin Summers MD;  Location:  GI      ESOPHAGOSCOPY, GASTROSCOPY, DUODENOSCOPY (EGD), DILATATION, COMBINED N/A 2/1/2017    Procedure: COMBINED ESOPHAGOSCOPY, GASTROSCOPY, DUODENOSCOPY (EGD), DILATATION;  Surgeon: Corry Franco MD;  Location:  OR     GI SURGERY      ERCP     GYN SURGERY      hysterectomy     HC CAPSULE ENDOSCOPY N/A 9/13/2016    Procedure: CAPSULE/PILL CAM ENDOSCOPY;  Surgeon: Iraj Choe MD;  Location:  GI     HC ESOPH/GAS REFLUX TEST W NASAL IMPED >1 HR N/A 2/17/2016    Procedure: ESOPHAGEAL IMPEDENCE FUNCTION TEST WITH 24 HOUR PH GREATER THAN 1 HOUR;  Surgeon: Charan Ozuna MD;  Location:  GI     HC UGI ENDOSCOPY W EUS  5/30/2013    Procedure: COMBINED ENDOSCOPIC ULTRASOUND, ESOPHAGOSCOPY, GASTROSCOPY, DUODENOSCOPY (EGD);  Surgeon: Juan Yu MD;  Location:  GI     HEAD & NECK SURGERY      tumor removed     HYSTERECTOMY, PAP NO LONGER INDICATED       INJECT BLOCK MEDIAL BRANCH CERVICAL/THORACIC/LUMBAR Bilateral 3/25/2015    Procedure: INJECT BLOCK MEDIAL BRANCH CERVICAL / THORACIC / LUMBAR;  Surgeon: Malvin Rivera MD;  Location:  OR     INSERT PORT VASCULAR ACCESS N/A 9/12/2016    Procedure: INSERT PORT VASCULAR ACCESS;  Surgeon: Juani Casiano MD;  Location:  OR     LAPAROSCOPIC NISSEN FUNDOPLICATION N/A 12/23/2016    Procedure: LAPAROSCOPIC NISSEN FUNDOPLICATION;  Surgeon: Noah Brito MD;  Location:  OR     ORTHOPEDIC SURGERY       RESECT FIRST RIB Left 5/1/2015    Procedure: RESECT FIRST RIB;  Surgeon: Tien Yang MD;  Location:  OR       Past Medical History:   Past Medical History:   Diagnosis Date     Allergic rhinitis due to animal dander      Anemia      Arthritis     rheumatoid     Chronic abdominal pain      Cyclic vomiting syndrome      Diagnostic skin and sensitization tests (aka ALLERGENS) 2/19/15 IgE tests pos. for: cat/dog/DM/T/G/RW, NEG  for fish and shellfish.     DVT (deep venous thrombosis) (H) 2005    s/p long hospitalization -  "anticoagulated x 6 months     Endometriosis 2009    total hysterectomy     Gastro-oesophageal reflux disease      House dust mite allergy     2/19/15 IgE tests pos. for: cat/dog/DM/T/G/RW, NEG  for fish and shellfish.     Migraines      parotid mass 2012    benign, left, removed     PONV (postoperative nausea and vomiting)      Pyelonephritis due to Escherichia coli 5/15/2016     Uncomplicated asthma        Social History:   Social History     Tobacco Use     Smoking status: Former Smoker     Packs/day: 0.25     Years: 5.00     Pack years: 1.25     Types: Cigarettes     Quit date: 2010     Years since quittin.2     Smokeless tobacco: Never Used   Substance Use Topics     Alcohol use: Yes     Comment: social - not that often.  Maybe one glass/week.       Family History:   Family History   Problem Relation Age of Onset     Breast Cancer Mother      Cardiovascular Mother      Depression Mother      Lipids Mother      C.A.D. Father      Neurologic Disorder Father      Cardiovascular Father      Other - See Comments Son         Autism        Allergies:   Allergies   Allergen Reactions     Propranolol Shortness Of Breath            Compazine Hives     Severe muscle cramping and bad anxiety     Fluoxetine Other (See Comments)     Seizures     Gabapentin Swelling     Swelling of hands and feet     Lyrica Swelling     Metoclopramide Other (See Comments)     Very irritable.  Very irritable.     Pregabalin Swelling     Prochlorperazine Other (See Comments)     Severe muscle cramping and bad anxiety     Reglan Other (See Comments)     Very irritable.     Vancomycin Other (See Comments)     Able to tolerate with pre-medication (diphenhydramine) and slow rate.  \"crissy Syndrome\"     Wheat Diarrhea     Wheat Bran GI Disturbance     Adhesive Tape Rash     Skin irritation     Contrast Dye Rash     Ct contrast dye  Rash over entire trunk     Diatrizoate Rash     CT contrast  Ct contrast dye  Rash over entire trunk     Dye " Fdc Red [Red Dye] Rash     FD&C RED #40     Latex Hives and Rash     Liquid Adhesive Rash     Other [No Clinical Screening - See Comments] Rash     Diatrizoate meglumine     Penicillins Rash       Active Medications:   Current Outpatient Medications   Medication Sig Dispense Refill     albuterol (ALBUTEROL) 108 (90 BASE) MCG/ACT inhaler Inhale 2 puffs into the lungs every 4 hours as needed for shortness of breath / dyspnea 1 Inhaler 5     alteplase (CATHFLO ACTIVASE) 2 MG injection by Injection route ONCE.       cetirizine (ZYRTEC) 10 MG tablet Take 4 tablets (40 mg) by mouth At Bedtime 30 tablet      cyanocobalamin (VITAMIN B12) 1000 MCG/ML injection Inject 1 mL (1,000 mcg) into the muscle every 30 days Inject  intramuscular every 4 weeks.  May give multi-use vial. 10 mL 5     EPIPEN 2-JOSE 0.3 MG/0.3ML injection Inject 0.3 mg into the muscle once as needed        famotidine (PEPCID) 10 MG tablet Take 10 mg by mouth daily       FOLIC ACID PO Take 3 mg by mouth daily       HEPARIN SODIUM, PORCINE, IJ        hydrochlorothiazide (HYDRODIURIL) 25 MG tablet Take 1 tablet (25 mg) by mouth daily TAKE ONE TABLET BY MOUTH ONE TIME DAILY (Patient taking differently: Take 25 mg by mouth daily) 90 tablet 3     hydrOXYzine (ATARAX) 25 MG tablet Take 1-4 tablets ( mg) by mouth every 6 hours as needed for anxiety 120 tablet 1     ipratropium - albuterol 0.5 mg/2.5 mg/3 mL (DUONEB) 0.5-2.5 (3) MG/3ML nebulization Take 1 vial (3 mLs) by nebulization every 4 hours as needed for shortness of breath / dyspnea or wheezing 1 Box 11     lisinopril (PRINIVIL,ZESTRIL) 20 MG tablet Take 1 tablet (20 mg) by mouth daily (Patient taking differently: Take 10 mg by mouth daily Currently on hold while tocilizumab is on hold per rheumatologist) 90 tablet 3     methotrexate 50 MG/2ML injection Inject 0.8 mLs (20 mg) Subcutaneous once a week Hold until resumed by rheumatology. Previously receiving weekly on mondays       methylPREDNISolone  "(MEDROL) 32 MG tablet Please take 1 tablet 16 hours and 2 hours before your CT scan 2 tablet 0     mometasone-formoterol (DULERA) 200-5 MCG/ACT oral inhaler Inhale 2 puffs into the lungs 2 times daily       montelukast (SINGULAIR) 10 MG tablet Take 1 tablet (10 mg) by mouth At Bedtime 90 tablet 3     OMALIZUMAB SC Inject 300 mg Subcutaneous       ondansetron (ZOFRAN) 2 MG/ML SOLN injection Inject 2 mLs (4 mg) into the vein as needed 3 TIMES WEEKLY PRN with PRN IV infusions  May repeat x 1 per individual infusion.  Please page the nurse at [phone] or call [phone] Option 4 with questions about this order. 2000 mL 20     ondansetron (ZOFRAN-ODT) 8 MG disintegrating tablet Take 1 tablet (8 mg) by mouth every 8 hours as needed for nausea 1 tablet 3 times daily. If not covered, try solution at same dosing. 90 tablet 3     predniSONE (DELTASONE) 5 MG tablet Take 7.5 mg by mouth daily        Prenatal Vit-Fe Fumarate-FA (PRENATAL MULTIVITAMIN  PLUS IRON) 27-0.8 MG TABS Take 1 tablet by mouth daily       tiZANidine (ZANAFLEX) 4 MG tablet Take 1 tablet (4 mg) by mouth every 6 hours as needed for muscle spasms TAKE ONE TABLET BY MOUTH EVERY SIX HOURS AS NEEDED FOR MUSCLE SPASM 90 tablet 3     tocilizumab (ACTEMRA) 80 MG/4ML Reported on 3/16/2017         Systemic Review:   CONSTITUTIONAL: NEGATIVE for fever, chills, change in weight  ENT/MOUTH: NEGATIVE for ear, mouth and throat problems  RESP: NEGATIVE for significant cough or SOB  CV: NEGATIVE for chest pain, palpitations or peripheral edema    Physical Examination:   Vital Signs: /78   Pulse 76   Temp 97.7  F (36.5  C) (Temporal)   Resp 18   Ht 1.549 m (5' 1\")   Wt 73.9 kg (163 lb)   LMP 11/21/2001   SpO2 98%   BMI 30.80 kg/m    GENERAL: healthy, alert and no distress  NECK: no adenopathy, no asymmetry, masses, or scars  RESP: lungs clear to auscultation - no rales, rhonchi or wheezes  CV: regular rate and rhythm, normal S1 S2, no S3 or S4, no murmur, " click or rub, no peripheral edema and peripheral pulses strong  ABDOMEN: soft, nontender, no hepatosplenomegaly, no masses and bowel sounds normal  MS: no gross musculoskeletal defects noted, no edema    Plan: Appropriate to proceed as scheduled.      Tejas Orourke DO  8/8/2022    PCP:  Nikolay Baeza

## 2022-08-08 NOTE — ANESTHESIA PREPROCEDURE EVALUATION
Anesthesia Pre-Procedure Evaluation    Patient: Demetri Mosley   MRN: [id number] : 1980        Procedure : Procedure(s):  ESOPHAGOGASTRODUODENOSCOPY (EGD)          Past Medical History:   Diagnosis Date     Allergic rhinitis due to animal dander      Anemia      Arthritis     rheumatoid     Chronic abdominal pain      Cyclic vomiting syndrome      Diagnostic skin and sensitization tests (aka ALLERGENS) 2/19/15 IgE tests pos. for: cat/dog/DM/T/G/RW, NEG  for fish and shellfish.     DVT (deep venous thrombosis) (H)     s/p long hospitalization - anticoagulated x 6 months     Endometriosis 2009    total hysterectomy     Gastro-oesophageal reflux disease      House dust mite allergy     2/19/15 IgE tests pos. for: cat/dog/DM/T/G/RW, NEG  for fish and shellfish.     Migraines      parotid mass     benign, left, removed     PONV (postoperative nausea and vomiting)      Pyelonephritis due to Escherichia coli 5/15/2016     Uncomplicated asthma       Past Surgical History:   Procedure Laterality Date     CHOLECYSTECTOMY       COLONOSCOPY N/A 2016    Procedure: COLONOSCOPY;  Surgeon: Edvin Summers MD;  Location:  GI     ENT SURGERY      T&A     ESOPHAGOSCOPY, GASTROSCOPY, DUODENOSCOPY (EGD), COMBINED N/A 2016    Procedure: COMBINED ESOPHAGOSCOPY, GASTROSCOPY, DUODENOSCOPY (EGD), BIOPSY SINGLE OR MULTIPLE;  Surgeon: Edvin Summers MD;  Location:  GI     ESOPHAGOSCOPY, GASTROSCOPY, DUODENOSCOPY (EGD), DILATATION, COMBINED N/A 2017    Procedure: COMBINED ESOPHAGOSCOPY, GASTROSCOPY, DUODENOSCOPY (EGD), DILATATION;  Surgeon: Corry Franco MD;  Location:  OR     GI SURGERY      ERCP     GYN SURGERY      hysterectomy     HC CAPSULE ENDOSCOPY N/A 2016    Procedure: CAPSULE/PILL CAM ENDOSCOPY;  Surgeon: Iraj Choe MD;  Location:  GI     HC ESOPH/GAS REFLUX TEST W NASAL IMPED >1 HR N/A 2016    Procedure: ESOPHAGEAL IMPEDENCE FUNCTION TEST WITH 24 HOUR PH  "GREATER THAN 1 HOUR;  Surgeon: Charan Ozuna MD;  Location:  GI     HC UGI ENDOSCOPY W EUS  5/30/2013    Procedure: COMBINED ENDOSCOPIC ULTRASOUND, ESOPHAGOSCOPY, GASTROSCOPY, DUODENOSCOPY (EGD);  Surgeon: Juan Yu MD;  Location:  GI     HEAD & NECK SURGERY      tumor removed     HYSTERECTOMY, PAP NO LONGER INDICATED       INJECT BLOCK MEDIAL BRANCH CERVICAL/THORACIC/LUMBAR Bilateral 3/25/2015    Procedure: INJECT BLOCK MEDIAL BRANCH CERVICAL / THORACIC / LUMBAR;  Surgeon: Malvin Rivera MD;  Location:  OR     INSERT PORT VASCULAR ACCESS N/A 9/12/2016    Procedure: INSERT PORT VASCULAR ACCESS;  Surgeon: uJani Casiano MD;  Location:  OR     LAPAROSCOPIC NISSEN FUNDOPLICATION N/A 12/23/2016    Procedure: LAPAROSCOPIC NISSEN FUNDOPLICATION;  Surgeon: Noah Brito MD;  Location:  OR     ORTHOPEDIC SURGERY       RESECT FIRST RIB Left 5/1/2015    Procedure: RESECT FIRST RIB;  Surgeon: Tien Yang MD;  Location: [facility]      Allergies   Allergen Reactions     Propranolol Shortness Of Breath            Compazine Hives     Severe muscle cramping and bad anxiety     Fluoxetine Other (See Comments)     Seizures     Gabapentin Swelling     Swelling of hands and feet     Lyrica Swelling     Metoclopramide Other (See Comments)     Very irritable.  Very irritable.     Pregabalin Swelling     Prochlorperazine Other (See Comments)     Severe muscle cramping and bad anxiety     Reglan Other (See Comments)     Very irritable.     Vancomycin Other (See Comments)     Able to tolerate with pre-medication (diphenhydramine) and slow rate.  \"crissy Syndrome\"     Wheat Diarrhea     Wheat Bran GI Disturbance     Adhesive Tape Rash     Skin irritation     Contrast Dye Rash     Ct contrast dye  Rash over entire trunk     Diatrizoate Rash     CT contrast  Ct contrast dye  Rash over entire trunk     Dye Fdc Red [Red Dye] Rash     FD&C RED #40     Latex Hives and Rash     Liquid Adhesive Rash "     Other [No Clinical Screening - See Comments] Rash     Diatrizoate meglumine     Penicillins Rash      Social History     Tobacco Use     Smoking status: Former Smoker     Packs/day: 0.25     Years: 5.00     Pack years: 1.25     Types: Cigarettes     Quit date: 2010     Years since quittin.2     Smokeless tobacco: Never Used   Substance Use Topics     Alcohol use: Yes     Comment: social - not that often.  Maybe one glass/week.      Wt Readings from Last 1 Encounters:   22 73.9 kg (163 lb)        Anesthesia Evaluation   Pt has had prior anesthetic.     History of anesthetic complications  - PONV.  nausea with fentanyl.    ROS/MED HX  ENT/Pulmonary:     (+) Intermittent, asthma     Neurologic:       Cardiovascular:     (+) hypertension-----    METS/Exercise Tolerance:     Hematologic:       Musculoskeletal: Comment: RA      GI/Hepatic:     (+) GERD,     Renal/Genitourinary:       Endo:       Psychiatric/Substance Use:     (+) psychiatric history anxiety     Infectious Disease:       Malignancy:       Other:            Physical Exam    Airway  airway exam normal           Respiratory Devices and Support         Dental  no notable dental history         Cardiovascular   cardiovascular exam normal          Pulmonary   pulmonary exam normal                OUTSIDE LABS:  CBC:   Lab Results   Component Value Date    WBC 7.3 2016    WBC 4.1 11/10/2016    HGB 14.3 2016    HGB 14.3 2016    HCT 43.4 2016    HCT 40.9 11/10/2016     2016     2016     BMP:   Lab Results   Component Value Date     2016     2016    POTASSIUM 3.7 2016    POTASSIUM 3.8 2016    CHLORIDE 104 2016    CHLORIDE 108 2016    CO2 25 2016    CO2 24 2016    BUN 13 2016    BUN 9 2016    CR 0.83 2016    CR 0.79 2016    GLC 83 2016    GLC 88 2016     COAGS:   Lab Results   Component Value Date    PTT  25 09/12/2016    INR 1.13 09/12/2016     POC:   Lab Results   Component Value Date    HCG Negative 05/31/2016     HEPATIC:   Lab Results   Component Value Date    ALBUMIN 4.0 05/22/2019    PROTTOTAL 6.9 11/09/2016    ALT 26 11/09/2016    AST 20 11/09/2016    ALKPHOS 60 11/09/2016    BILITOTAL 0.8 11/09/2016     OTHER:   Lab Results   Component Value Date    LACT 0.3 (L) 08/13/2016    A1C 5.2 05/01/2015    OLE 9.3 12/21/2016    PHOS 3.6 05/25/2016    MAG 1.8 05/16/2016    LIPASE 114 08/12/2016    TSH 0.60 05/15/2016    CRP 4.8 08/13/2016    SED 8 06/24/2016       Anesthesia Plan    ASA Status:  2   NPO Status:  NPO Appropriate    Anesthesia Type: MAC.     - Reason for MAC: straight local not clinically adequate   Induction: Intravenous.   Maintenance: TIVA.        Consents    Anesthesia Plan(s) and associated risks, benefits, and realistic alternatives discussed. Questions answered and patient/representative(s) expressed understanding.    - Discussed:     - Discussed with:  Patient      - Extended Intubation/Ventilatory Support Discussed: No.      - Patient is DNR/DNI Status: No    Use of blood products discussed: No .     Postoperative Care       PONV prophylaxis: Background Propofol Infusion     Comments:           H&P reviewed: Unable to attach H&P to encounter due to EHR limitations. H&P Update: appropriate H&P reviewed, patient examined. No interval changes since H&P (within 30 days).         Cole Negron MD

## 2022-08-09 LAB
PATH REPORT.COMMENTS IMP SPEC: NORMAL
PATH REPORT.COMMENTS IMP SPEC: NORMAL
PATH REPORT.FINAL DX SPEC: NORMAL
PATH REPORT.GROSS SPEC: NORMAL
PATH REPORT.MICROSCOPIC SPEC OTHER STN: NORMAL
PATH REPORT.RELEVANT HX SPEC: NORMAL
PHOTO IMAGE: NORMAL

## 2022-08-19 ENCOUNTER — TELEPHONE (OUTPATIENT)
Dept: SURGERY | Facility: CLINIC | Age: 42
End: 2022-08-19

## 2022-08-19 ENCOUNTER — OFFICE VISIT (OUTPATIENT)
Dept: SURGERY | Facility: CLINIC | Age: 42
End: 2022-08-19
Payer: COMMERCIAL

## 2022-08-19 VITALS — WEIGHT: 160.4 LBS | HEIGHT: 61 IN | BODY MASS INDEX: 30.29 KG/M2

## 2022-08-19 DIAGNOSIS — K42.9 UMBILICAL HERNIA WITHOUT OBSTRUCTION AND WITHOUT GANGRENE: Primary | ICD-10-CM

## 2022-08-19 DIAGNOSIS — Z90.13 ABSENCE OF BOTH BREASTS: ICD-10-CM

## 2022-08-19 PROCEDURE — 99204 OFFICE O/P NEW MOD 45 MIN: CPT | Performed by: STUDENT IN AN ORGANIZED HEALTH CARE EDUCATION/TRAINING PROGRAM

## 2022-08-19 NOTE — NURSING NOTE
"Demetri Mosley's goals for this visit include:   Chief Complaint   Patient presents with     Consult     Breast reconstruction       She requests these members of her care team be copied on today's visit information: no    PCP: Nikolay Baeza    Referring Provider:  No referring provider defined for this encounter.    Ht 1.549 m (5' 1\")   Wt 72.8 kg (160 lb 6.4 oz)   LMP 11/21/2001   BMI 30.31 kg/m      Do you need any medication refills at today's visit? No    Melba Castano LPN      "

## 2022-08-19 NOTE — LETTER
8/19/2022         RE: Demetri Mosley  [address] [facility] [address]        Dear Colleague,    Thank you for referring your patient, Demetri Mosley, to the [facility]. Please see a copy of my visit note below.    PRS    HPI: 42-year-old female with history of BRCA1 mutation status post prophylactic bilateral mastectomies in 2018, with subsequent implant reconstruction, that was converted to autologous abdominally-based free flap reconstruction, presenting for third stage breast reconstruction consultation.  Patient had prophylactic bilateral mastectomies in 2018.  These were done in [facility].  She subsequently underwent implant based reconstruction.  After having permanent silicone gel implants, patient ultimately had a conversion to autologous reconstruction with abdominally-based free flaps done in October 2019.  Her postoperative course was complicated by a lower abdominal soft tissue infection, that cleared with a single shot of ceftriaxone and oral antibiotics.  Patient presents for evaluation regarding additional revision breast reconstruction.  She also has not happy with her bellybutton.  Of note, after her thoracic outlet decompression surgery, patient had a pulmonary embolus and was on anticoagulation for 1 year.  She also has a history of 4 miscarriages, including a miscarriage in the 20th week of gestation.  As a result, she had a hypercoagulability work-up, which included factor V Leiden Antithrombin III and antiphospholipid antibody testing, all of which came back negative.    ROS: Negative, see HPI  Past medical history: Rheumatoid arthritis, hypertension, GERD, anxiety, asthma  Past surgical history: Bilateral mastectomies in 2018, breast implant exchange, bilateral SUBHA flap breast reconstruction in 2019, hysterectomy, thoracic outlet syndrome decompression surgery 13-14 years ago  Medications: Tizanidine, pantoprazole, Zofran,  "methylprednisolone, albuterol, Atarax, Actemra, methotrexate, Cozaar, Zyrtec  Allergies: Propranolol, Compazine, fluoxetine, gabapentin, Lyrica, Reglan, pregabalin, prochlorperazine, Reglan, vancomycin, penicillins, latex  Family history: No bleeding or clotting problems, issues with anesthesia  Social history: Non-smoker, denies any tobacco or nicotine use    Examination:  Ht 1.549 m (5' 1\")   Wt 72.8 kg (160 lb 6.4 oz)   LMP 11/21/2001   BMI 30.31 kg/m    Nonlabored breathing  Not distressed  Bilateral breast flaps with port located on the left side  Bilateral chest with small dogears  Lower abdominal scar well-healed with small dogears laterally  Midline supraumbilical bulge versus hernia  Mild hypertrophic scarring at the umbilicus    No recent abdominal imaging    A/P: 42-year-old female status post abdominally-based free flap breast reconstruction presenting for revision surgery consultation    -Patient is a good candidate for bilateral reconstructed breast revision with lifting of the flaps and tightening of the lower pole with excision of bilateral chest dogears, as well as abdominal scar dogear excision with closure.  Since patient has a supraumbilical bulge versus hernia, we will also obtain a CT of the abdomen without contrast to evaluate.  If there is a small bulge, we can potentially repair this at the time of the revision breast reconstruction.  If there is a large hernia, discussed possible referral to a general surgeon.  -Since patient has had a prior hypercoagulability work-up, it is probably not necessary to repeat.  This was discussed with the patient.  Patient is agreeable.  -Photography today  -We will initiate prior authorization request  -CT abdomen without contrast has been ordered  -A total of 45 minutes was devoted to review of chart, direct face-to-face patient counseling and documentation during this encounter.    Quinn Bowden MD, PhD        Again, thank you for allowing me to " participate in the care of your patient.        Sincerely,        Quinn Bowden MD

## 2022-08-19 NOTE — TELEPHONE ENCOUNTER
Left voicemail with imaging line [phone] to call back and schedule this.    Madhuri escalona Procedure   Dermatology, General and Plastic Surgery, Urology Specialties   [facility] [place] [phone]

## 2022-08-19 NOTE — TELEPHONE ENCOUNTER
Orders entered by Dr. Bowden for a CT of the abdomen/pelvis.    Please contact patient to schedule.    Melba Castano LPN

## 2022-08-19 NOTE — PROGRESS NOTES
PRS    HPI: 42-year-old female with history of BRCA1 mutation status post prophylactic bilateral mastectomies in 2018, with subsequent implant reconstruction, that was converted to autologous abdominally-based free flap reconstruction, presenting for third stage breast reconstruction consultation.  Patient had prophylactic bilateral mastectomies in 2018.  These were done in [facility].  She subsequently underwent implant based reconstruction.  After having permanent silicone gel implants, patient ultimately had a conversion to autologous reconstruction with abdominally-based free flaps done in October 2019.  Her postoperative course was complicated by a lower abdominal soft tissue infection, that cleared with a single shot of ceftriaxone and oral antibiotics.  Patient presents for evaluation regarding additional revision breast reconstruction.  She also has not happy with her bellybutton.  Of note, after her thoracic outlet decompression surgery, patient had a pulmonary embolus and was on anticoagulation for 1 year.  She also has a history of 4 miscarriages, including a miscarriage in the 20th week of gestation.  As a result, she had a hypercoagulability work-up, which included factor V Leiden Antithrombin III and antiphospholipid antibody testing, all of which came back negative.    ROS: Negative, see HPI  Past medical history: Rheumatoid arthritis, hypertension, GERD, anxiety, asthma  Past surgical history: Bilateral mastectomies in 2018, breast implant exchange, bilateral SUBHA flap breast reconstruction in 2019, hysterectomy, thoracic outlet syndrome decompression surgery 13-14 years ago  Medications: Tizanidine, pantoprazole, Zofran, methylprednisolone, albuterol, Atarax, Actemra, methotrexate, Cozaar, Zyrtec  Allergies: Propranolol, Compazine, fluoxetine, gabapentin, Lyrica, Reglan, pregabalin, prochlorperazine, Reglan, vancomycin, penicillins, latex  Family history: No bleeding or clotting  "problems, issues with anesthesia  Social history: Non-smoker, denies any tobacco or nicotine use    Examination:  Ht 1.549 m (5' 1\")   Wt 72.8 kg (160 lb 6.4 oz)   LMP 11/21/2001   BMI 30.31 kg/m    Nonlabored breathing  Not distressed  Bilateral breast flaps with port located on the left side  Bilateral chest with small dogears  Lower abdominal scar well-healed with small dogears laterally  Midline supraumbilical bulge versus hernia  Mild hypertrophic scarring at the umbilicus    No recent abdominal imaging    A/P: 42-year-old female status post abdominally-based free flap breast reconstruction presenting for revision surgery consultation    -Patient is a good candidate for bilateral reconstructed breast revision with lifting of the flaps and tightening of the lower pole with excision of bilateral chest dogears, as well as abdominal scar dogear excision with closure.  Since patient has a supraumbilical bulge versus hernia, we will also obtain a CT of the abdomen without contrast to evaluate.  If there is a small bulge, we can potentially repair this at the time of the revision breast reconstruction.  If there is a large hernia, discussed possible referral to a general surgeon.  -Since patient has had a prior hypercoagulability work-up, it is probably not necessary to repeat.  This was discussed with the patient.  Patient is agreeable.  -Photography today  -We will initiate prior authorization request  -CT abdomen without contrast has been ordered  -A total of 45 minutes was devoted to review of chart, direct face-to-face patient counseling and documentation during this encounter.    Quinn Bowden MD, PhD    "

## 2022-08-26 ENCOUNTER — ANCILLARY PROCEDURE (OUTPATIENT)
Dept: CT IMAGING | Facility: CLINIC | Age: 42
End: 2022-08-26
Attending: STUDENT IN AN ORGANIZED HEALTH CARE EDUCATION/TRAINING PROGRAM
Payer: COMMERCIAL

## 2022-08-26 DIAGNOSIS — K42.9 UMBILICAL HERNIA WITHOUT OBSTRUCTION AND WITHOUT GANGRENE: ICD-10-CM

## 2022-08-26 PROCEDURE — 74176 CT ABD & PELVIS W/O CONTRAST: CPT | Performed by: RADIOLOGY

## 2022-09-09 ENCOUNTER — TELEPHONE (OUTPATIENT)
Dept: SURGERY | Facility: CLINIC | Age: 42
End: 2022-09-09

## 2022-09-09 NOTE — TELEPHONE ENCOUNTER
"Chart reviewed.    Per last office visit notes from Dr. Sheppard: \"Since patient has a supraumbilical bulge versus hernia, we will also obtain a CT of the abdomen without contrast to evaluate.  If there is a small bulge, we can potentially repair this at the time of the revision breast reconstruction.  If there is a large hernia, discussed possible referral to a general surgeon.\"    Routing to Dr. Sheppard to see what all should be included in the prior auth.    Rosalva Oneil RN             "

## 2022-09-09 NOTE — TELEPHONE ENCOUNTER
Voicemail received from patient wanting a status update on her prior authorization for her breast surgery with Dr. Bowden. She is asking for a return call on her cell phone number.     Mulu Christensen, Surgery Scheduling Coordinator

## 2022-09-13 NOTE — TELEPHONE ENCOUNTER
Called pt to let her know we are working on obtaining prior authorization and will update further when possible.    Pt verbalized understanding, no further questions.      Rosalva Oneil RN

## 2022-09-16 ENCOUNTER — E-VISIT (OUTPATIENT)
Dept: URGENT CARE | Facility: CLINIC | Age: 42
End: 2022-09-16
Payer: COMMERCIAL

## 2022-09-16 DIAGNOSIS — R21 RASH AND NONSPECIFIC SKIN ERUPTION: Primary | ICD-10-CM

## 2022-09-16 PROCEDURE — 99207 PR NON-BILLABLE SERV PER CHARTING: CPT | Performed by: FAMILY MEDICINE

## 2022-09-16 NOTE — PATIENT INSTRUCTIONS
Dear Demetri Mosley,    We are sorry you are not feeling well. Based on the responses you provided, it is recommended that you be seen in-person in urgent care so we can better evaluate your symptoms. Please click here to find the nearest urgent care location to you.   You will not be charged for this Visit. Thank you for trusting us with your care.    Clarissa Ge MD

## 2022-09-19 NOTE — TELEPHONE ENCOUNTER
PB DOS: TBD  Type of Procedure: bilateral reconstructed breast revision surgery  CPT Codes:     53907-60  Revision of reconstructed breast (eg, significant removal of tissue, re-advancement and/or re-inset of flaps in autologous reconstruction or significant capsular revision combined with soft tissue excision in implant-based reconstruction)         73185 - Excision, benign lesion including margins, except skin tag, trunk, arms or legs; excised diameter 0.6 cm to 1.0 cm    42716 - Excision,  benign lesion including margins, except skin tag, trunk, arms or legs; excised diameter 1.1 cm to 2.0 cm    03891 - Excision, benign lesion including margins, except skin tag, trunk, arms or legs; excised diameter 2.1 cm to 3.0 cm    10545 - Excision, benign lesion including margins, except skin tag, trunk, arms or legs; excised diameter 3.1 cm to 4.0 cm    31045 - Excision, benign lesion including margins, except skin tag, trunk, arms or legs; excised diameter over 4.0 cm    25438 - Repair, intermediate, wounds of scalp, axillae, trunk and/or extremities (excluding hands and feet); 2.5 cm or less    50303 - Repair, intermediate, wounds of scalp, axillae, trunk and/or extremities (excluding hands and feet); 2.6 cm to 7.5 cm  69455 - Repair, intermediate, wounds of scalp, axillae, trunk and/or extremities (excluding hands and feet); 7.6 cm to 12.5 cm  76000 - Repair, intermediate, wounds of scalp, axillae, trunk and/or extremities (excluding hands and feet); 12.6 cm to 20.0 cm  71396 - Repair, intermediate, wounds of scalp, axillae, trunk and/or extremities (excluding hands and feet); 20.1 cm to 30.0 cm  64980 - Repair, intermediate, wounds of scalp, axillae, trunk and/or extremities (excluding hands and feet); over 30 cm      ICD10 Codes:  L76.82  Z90.13  Z15.01  Z15.09  Z98.890    Surgeon/Ordering provider: Quinn Bowden MD  Pre-cert/Authorization completed: [employer] bazan emailed   Payer: [employer]  Spoke to   Ref. # / Auth  #   Valid Dates:

## 2022-09-23 NOTE — TELEPHONE ENCOUNTER
PB DOS: TBD  Type of Procedure: bilateral reconstructed breast revision surgery  CPT Codes: 99921-75  Revision of reconstructed breast (eg, significant removal of tissue, re-advancement and/or re-inset of flaps in autologous reconstruction or significant capsular revision combined with soft tissue excision in implant-based reconstruction)   96786 - Excision, benign lesion including margins, except skin tag, trunk, arms or legs; excised diameter 0.6 cm to 1.0 cm   33365 - Excision,  benign lesion including margins, except skin tag, trunk, arms or legs; excised diameter 1.1 cm to 2.0 cm  26710 - Excision, benign lesion including margins, except skin tag, trunk, arms or legs; excised diameter 2.1 cm to 3.0 cm  32028 - Excision, benign lesion including margins, except skin tag, trunk, arms or legs; excised diameter 3.1 cm to 4.0 cm    52044 - Excision, benign lesion including margins, except skin tag, trunk, arms or legs; excised diameter over 4.0 cm  59019 - Repair, intermediate, wounds of scalp, axillae, trunk and/or extremities (excluding hands and feet); 2.5 cm or less  17440 - Repair, intermediate, wounds of scalp, axillae, trunk and/or extremities (excluding hands and feet); 2.6 cm to 7.5 cm  28149 - Repair, intermediate, wounds of scalp, axillae, trunk and/or extremities (excluding hands and feet); 7.6 cm to 12.5 cm  30643 - Repair, intermediate, wounds of scalp, axillae, trunk and/or extremities (excluding hands and feet); 12.6 cm to 20.0 cm  28240 - Repair, intermediate, wounds of scalp, axillae, trunk and/or extremities (excluding hands and feet); 20.1 cm to 30.0 cm  09759 - Repair, intermediate, wounds of scalp, axillae, trunk and/or extremities (excluding hands and feet); over 30 cm     ICD10 Codes:  L76.82  Z90.13  Z15.01  Z15.09  Z98.890    Surgeon/Ordering provider: Quinn Bowden MD  Pre-cert/Authorization completed:  No PA Required if billed with Dx code Z90.13  Payer: [employer]  Spoke to [employer] PA  list- Sumaya SILVERMAN  Ref. # / Auth #   Valid Dates:

## 2022-09-27 ENCOUNTER — TELEPHONE (OUTPATIENT)
Dept: SURGERY | Facility: CLINIC | Age: 42
End: 2022-09-27

## 2022-09-27 NOTE — TELEPHONE ENCOUNTER
Called and scheduled surgery with Dr. Bowden on 12/14 (offered earlier date but pt declined) in .    H&P with PCP.     COVID test will be done at home 1-2 days before surgery and will bring a picture of the results the morning of.    Post-op scheduled for 12/30.    Patient has questions on recovery time and would like a RN to follow up with her regarding recovery time. She also has questions regarding her R.A infusions/medications. Her last one is 11/21 and wants to make sure its enough time before surgery.    Writer will mail surg packet.

## 2022-09-29 ENCOUNTER — VIRTUAL VISIT (OUTPATIENT)
Dept: GASTROENTEROLOGY | Facility: CLINIC | Age: 42
End: 2022-09-29
Payer: COMMERCIAL

## 2022-09-29 VITALS — BODY MASS INDEX: 29.85 KG/M2 | WEIGHT: 158 LBS

## 2022-09-29 DIAGNOSIS — R09.A2 GLOBUS SENSATION: Primary | ICD-10-CM

## 2022-09-29 DIAGNOSIS — R13.19 ESOPHAGEAL DYSPHAGIA: ICD-10-CM

## 2022-09-29 PROCEDURE — 99215 OFFICE O/P EST HI 40 MIN: CPT | Mod: 95 | Performed by: INTERNAL MEDICINE

## 2022-09-29 ASSESSMENT — PAIN SCALES - GENERAL: PAINLEVEL: MODERATE PAIN (5)

## 2022-09-29 NOTE — LETTER
"    9/29/2022         RE: Demetri Mosley  [address] [facility] [address]        Dear Colleague,    Thank you for referring your patient, Demetri Mosley, to the [facility] [place]. Please see a copy of my visit note below.    Demetri is a 42 year old who is being evaluated via a billable video visit.      How would you like to obtain your AVS? MyChart  If the video visit is dropped, the invitation should be resent by: Text to cell phone: [phone]  Will anyone else be joining your video visit? No        Video-Visit Details    Video Start Time: 1:16 PM    Type of service:  Video Visit    Video End Time:1:44 PM    Originating Location (pt. Location): Home    Distant Location (provider location):  [facility] [place]     Platform used for Video Visit: [employer]         Gastroenterology Visit for: Demetri Mosley 1980   MRN: [id number]     Reason for Visit:  chief complaint    Referred by: Nat  / [address] [place] [address] [facility] [address] / [facility] [address]  Patient Care Team:  Nikolay Baeza as PCP - General (Family Practice)  Antonio Hoskins MD as MD (Hematology & Oncology)  Chris Boyce MD as MD (Hand Surgery)  Yasmine Berrios APRN CNP as Nurse Practitioner (Nurse Practitioner - Adult Health)  Dada Ge MD as MD (Specialist)  Lesvia Garcia MD as MD (Orthopedics)  Alesha Johns APRN CNS as Registered Nurse (Clinical Nurse Specialist)  Tejas Orourke DO as MD (Gastroenterology)  Tejas Orourke DO as Assigned Gastroenterology Provider  Quinn Bowden MD as Assigned Surgical Provider    History of Present Illness:   Demetri Mosley is a 42 year old female with allergies, \"GI issues forever\", RA, 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 Johns with a chief complaint of globus, and dysphagia. "     9/29/22  No change in globus and food catching sensation is unchanged. Taking PPI BID with no change in symptoms. Globus persists. Stellate ganglion injection helps for at most a few days after she gets one however it has been awhile due to scheduling. Allergic to gabapentin and pedal edema with lyrica. Has a prescription for nortriptyline. Not interested in mirtazapine due to weight gain side effect. Last dilation was done in 2016 after nissen before globus sensation.     ---------------------------------------------------------------  5/24/22  Demetri Mosley states 4-5 years ago thought she swallowed fish bone and had the sensation of something in her esophagus. Thought she had reflux based on ENT scope. She feels that food gets stuck occasionally. It has been years since a dilation. She felt that dilation in the past helped but that was mid sternal sensation compared to now which is upper esophageal. Gets stellate ganglion blocks for CRPS. Symptoms got better following injection. Following injection symptoms were less and is getting injections every other week. Leading up to an injection she can be at a 10/10 and injection takes it down to a 2/10. Sensation is present always but worse when she swallows. Last scope was prior to this current sensation    Had distal esophageal dysphagia and barium tab get stuck. Had HRM at that time. 2019 HRM was performed while having the upper esophageal symptoms and did not identify an etiology.    Has port for infusions for RA. Able to access for procedures.     The patient states she has been scoped a lot. Prior nissen (2015) performed at the [facility]. [place] [place]    20 allergies: gabapentin allergy  TCAs are too sedating for the patient currently in grad school for public health. Patient literacy of chronic respiratory disease pre/post genetic sequencing.     PROPOFOL  REQUIRED  ---------------------------------------------------------------     Demetri WILBUR Mosley denies  odynophagia,  nausea, vomiting, early satiety, abdominal pain, abdominal distension/bloating, diarrhea, constipation, hematochezia, or melena. No unintentional weight loss.     Wt Readings from Last 5 Encounters:   09/29/22 71.7 kg (158 lb)   08/19/22 72.8 kg (160 lb 6.4 oz)   08/08/22 73.9 kg (163 lb)   05/22/19 74 kg (163 lb 1.6 oz)   02/06/19 74 kg (163 lb 3.2 oz)        Esophageal Questionnaire(s)    BEDQ Questionnaire  BEDQ Questionnaire: How Often Have You Had the Following? 5/24/2022   Trouble eating solid food (meat, bread, vegetables) 4   Trouble eating soft foods (yogurt, jello, pudding) 0   Trouble swallowing liquids 0   Pain while swallowing 5   Coughing or choking while swallowing foods or liquids 2   Total Score: 11     BEDQ Questionnaire: Discomfort/Pain Ratings 5/24/2022   Eating solid food (meat, bread, vegetables) 2   Eating soft foods (yogurt, jello, pudding) 0   Drinking liquid 0   Total Score: 2       Eckardt Questionnaire  Eckardt Questionnaire 5/24/2022   Dysphagia 2   Regurgitation 1   Retrosternal Pain 1   Weight Loss (kg) 0   Total Score:  4       Promis 10 Questionnaire  PROMIS 10 FLOWSHEET DATA 6/30/2015 5/24/2022   In general, would you say your health is: 2 3   In general, would you say your quality of life is: 3 3   In general, how would you rate your physical health? 2 3   In general, how would you rate your mental health, including your mood and your ability to think? 3 3   In general, how would you rate your satisfaction with your social activities and relationships? 2 2   In general, please rate how well you carry out your usual social activities and roles. (This includes activities at home, at work and in your community, and responsibilities as a parent, child, spouse, employee, friend, etc.) 2 3   To what extent are you able to carry out your everyday physical activities such as  walking, climbing stairs, carrying groceries, or moving a chair? 2 3   In the past 7 days, how often have you been bothered by emotional problems such as feeling anxious, depressed, or irritable? 3 3   In the past 7 days, how would you rate your fatigue on average? 3 3   In the past 7 days, how would you rate your pain on average, where 0 means no pain, and 10 means worst imaginable pain? 3 5   Mental health question re-calculation - no clinical value - 3   Physical health question re-calculation - no clinical value - 3   Pain question re-calculation - no clinical value 4 3   Global Mental Health Score 11 11   Global Physical Health Score 11 12   PROMIS TOTAL - SUBSCORES 22 23     STUDIES & PROCEDURES:    EGD:   Date 8/8/22  The Z-line was regular and was found 39 cm from the incisors.        A non-bleeding diverticulum with a small opening and no stigmata of        recent bleeding was found in the distal esophagus.        Normal mucosa was found in the entire esophagus. Eight biopsies were        obtained with cold large-capacity forceps for histology randomly in the        proximal esophagus and in the distal esophagus.        The gastroesophageal flap valve was visualized endoscopically and        classified as Hill Grade I (prominent fold, tight to endoscope).        The entire examined stomach was normal.        The duodenal bulb, first portion of the duodenum, second portion of the        duodenum and examined duodenum were normal.                                                                                     Impression:            - Z-line regular, 39 cm from the incisors.                          - Diverticulum in the distal esophagus.                          - Normal mucosa was found in the entire esophagus.                          - Gastroesophageal flap valve classified as Hill Grade                          I (prominent fold, tight to endoscope).                          - Normal stomach.                           - Normal duodenal bulb, first portion of the duodenum,                          second portion of the duodenum and examined duodenum.                          - Eight biopsies were obtained in the proximal                          esophagus and in the distal esophagus.   Pathology  A.  ESOPHAGUS, DISTAL, BIOPSY:  - Squamous mucosa with mild reactive changes  - No intraepithelial eosinophils identified    B.  ESOPHAGUS, PROXIMAL, BIOPSY:  - Squamous mucosa with mild reactive changes  - No intraepithelial eosinophils identified       EGD  Date: 6/2/16  Impression:   The examined esophagus was normal.        The entire examined stomach was normal.        The examined duodenum was normal. Biopsies were taken with a cold        forceps for evaluation of celiac disease.                                                                                    Impression:          - Normal esophagus.                        - Normal stomach.                        - Normal examined duodenum. Biopsied.   Recommendation:      - Await pathology results.                        - Discharge patient to home.                        - Return to previous diet.   Pathology Report:    Colonoscopy:  Date:  Impression:  Pathology Report:     EndoFLIP directed at the UES or LES (8cm (EF-325) balloon length or 16cm (EF-322) balloon length):   Date:  8cm balloon  Balloon inflation Balloon pressure CSA (mm^2) DI (mm^2/mmHg) Dmin (mm) Compliance   20 (ladmark ID)        30        40        50           16cm balloon  Balloon inflation Balloon pressure CSA (mm^2) DI (mm^2/mmHg) Dmin (mm) Compliance   30 (ladmark ID)        40        50        60        70           High Resolution Manometry:  Date: 5/8/2019  Impression:    High Resolution Manometry  2/17/16    PH/Impedance:  Date: 3/11/16  Impression:       Bravo:  48 or 96hr  Date:  Impression:    CT:  Date:  Impression:    Esophagram:  Date: 1/13/22  Impression:  FINDINGS: A single  contrast esophagram was performed. There are no  mucosal abnormalities. There are no strictures or filling defects.  Esophageal motility is intact. No evidence of reflux.     An upper GI was performed and the esophagus, stomach, and duodenum are  unremarkable. Esophageal motility is unremarkable. The rugal pattern  within the stomach is unremarkable with no ulcer identified. The  duodenum is well-seen in distended and contracted states. The  visualized jejunum is normal. Fundoplication changes. There are no  filling defects seen within the esophagus, stomach, or duodenum. No  spontaneous reflux was seen.                                                                      IMPRESSION: Fundoplication changes with otherwise normal upper GI. No  evidence of reflux.    Video Swallow  6/7/2019    FINDINGS: The swallowing mechanism is within normal limits with no  penetration or aspiration. No pharyngeal mass, web, or diverticulum  are seen.                                                                      IMPRESSION: Normal swallowing mechanism, as described above.    Esophagram   2/6/2019  IMPRESSION:   1. Again seen smooth relative narrowing of the esophagus at the level  of the aortic arch with difficulty passing a barium tablet at this  level. The tablet passed distally with multiple sips. No mucosal   abnormality or mass at this level.  2. Also redemonstration of the narrowing at the gastroesophageal  junction secondary to Nissen fundoplication causes barium tablet.  Eventual passage with multiple sips.  3. No gastroesophageal reflux was appreciated.     Prior medical records were reviewed including, but not limited to, notes from referring providers, lab work, radiographic tests, and other diagnostic tests. Pertinent results were summarized above.     History     Past Medical History:   Diagnosis Date     Allergic rhinitis due to animal dander      Anemia      Arthritis     rheumatoid     Chronic abdominal pain       Cyclic vomiting syndrome      Diagnostic skin and sensitization tests (aka ALLERGENS) 2/19/15 IgE tests pos. for: cat/dog/DM/T/G/RW, NEG  for fish and shellfish.     DVT (deep venous thrombosis) (H) 2005    s/p long hospitalization - anticoagulated x 6 months     Endometriosis 2009    total hysterectomy     Gastro-oesophageal reflux disease      House dust mite allergy     2/19/15 IgE tests pos. for: cat/dog/DM/T/G/RW, NEG  for fish and shellfish.     Migraines      parotid mass 2012    benign, left, removed     PONV (postoperative nausea and vomiting)      Pyelonephritis due to Escherichia coli 5/15/2016     Uncomplicated asthma        Past Surgical History:   Procedure Laterality Date     CHOLECYSTECTOMY       COLONOSCOPY N/A 6/2/2016    Procedure: COLONOSCOPY;  Surgeon: Edvin Summers MD;  Location:  GI     ENT SURGERY      T&A     ESOPHAGOSCOPY, GASTROSCOPY, DUODENOSCOPY (EGD), COMBINED N/A 6/2/2016    Procedure: COMBINED ESOPHAGOSCOPY, GASTROSCOPY, DUODENOSCOPY (EGD), BIOPSY SINGLE OR MULTIPLE;  Surgeon: Edvin Summers MD;  Location: [facility]     ESOPHAGOSCOPY, GASTROSCOPY, DUODENOSCOPY (EGD), COMBINED N/A 8/8/2022    Procedure: ESOPHAGOGASTRODUODENOSCOPY, WITH BIOPSY;  Surgeon: Tejas Orourke DO;  Location: [facility] OR     ESOPHAGOSCOPY, GASTROSCOPY, DUODENOSCOPY (EGD), DILATATION, COMBINED N/A 2/1/2017    Procedure: COMBINED ESOPHAGOSCOPY, GASTROSCOPY, DUODENOSCOPY (EGD), DILATATION;  Surgeon: Corry Franco MD;  Location:  OR     GI SURGERY      ERCP     GYN SURGERY      hysterectomy     HC CAPSULE ENDOSCOPY N/A 9/13/2016    Procedure: CAPSULE/PILL CAM ENDOSCOPY;  Surgeon: Iraj Choe MD;  Location:  GI      ESOPH/GAS REFLUX TEST W NASAL IMPED >1 HR N/A 2/17/2016    Procedure: ESOPHAGEAL IMPEDENCE FUNCTION TEST WITH 24 HOUR PH GREATER THAN 1 HOUR;  Surgeon: Charan Ozuna MD;  Location:  GI      UGI ENDOSCOPY W EUS  5/30/2013    Procedure: COMBINED ENDOSCOPIC  ULTRASOUND, ESOPHAGOSCOPY, GASTROSCOPY, DUODENOSCOPY (EGD);  Surgeon: Juan Yu MD;  Location:  GI     HEAD & NECK SURGERY      tumor removed     HYSTERECTOMY, PAP NO LONGER INDICATED       INJECT BLOCK MEDIAL BRANCH CERVICAL/THORACIC/LUMBAR Bilateral 3/25/2015    Procedure: INJECT BLOCK MEDIAL BRANCH CERVICAL / THORACIC / LUMBAR;  Surgeon: Malvin Rivera MD;  Location:  OR     INSERT PORT VASCULAR ACCESS N/A 2016    Procedure: INSERT PORT VASCULAR ACCESS;  Surgeon: Juani Casiano MD;  Location:  OR     LAPAROSCOPIC NISSEN FUNDOPLICATION N/A 2016    Procedure: LAPAROSCOPIC NISSEN FUNDOPLICATION;  Surgeon: Noah Brito MD;  Location:  OR     ORTHOPEDIC SURGERY       RESECT FIRST RIB Left 2015    Procedure: RESECT FIRST RIB;  Surgeon: Tien Yang MD;  Location:  OR       Social History     Socioeconomic History     Marital status:      Spouse name: Not on file     Number of children: Not on file     Years of education: Not on file     Highest education level: Not on file   Occupational History     Not on file   Tobacco Use     Smoking status: Former Smoker     Packs/day: 0.25     Years: 5.00     Pack years: 1.25     Types: Cigarettes     Quit date: 2010     Years since quittin.3     Smokeless tobacco: Never Used   Substance and Sexual Activity     Alcohol use: Yes     Comment: social - not that often.  Maybe one glass/week.     Drug use: No     Sexual activity: Yes     Partners: Male     Birth control/protection: Surgical   Other Topics Concern     Parent/sibling w/ CABG, MI or angioplasty before 65F 55M? Yes     Comment: quad bypass   Social History Narrative    Demetri works as a [employer] at the [employer]. She lives with her  and 3 children in a house with carpet. She shares yardwork detail with her . They have a dog and a cat.      Social Determinants of Health     Financial Resource Strain: Not on file   Food Insecurity: Not on file    Transportation Needs: Not on file   Physical Activity: Not on file   Stress: Not on file   Social Connections: Not on file   Intimate Partner Violence: Not on file   Housing Stability: Not on file       Family History   Problem Relation Age of Onset     Breast Cancer Mother      Cardiovascular Mother      Depression Mother      Lipids Mother      C.A.D. Father      Neurologic Disorder Father      Cardiovascular Father      Other - See Comments Son         Autism      Family history reviewed and edited as appropriate    Medications and Allergies:     Outpatient Encounter Medications as of 9/29/2022   Medication Sig Dispense Refill     albuterol (ALBUTEROL) 108 (90 BASE) MCG/ACT inhaler Inhale 2 puffs into the lungs every 4 hours as needed for shortness of breath / dyspnea 1 Inhaler 5     alteplase (CATHFLO ACTIVASE) 2 MG injection by Injection route ONCE.       cetirizine (ZYRTEC) 10 MG tablet Take 4 tablets (40 mg) by mouth At Bedtime 30 tablet      cyanocobalamin (VITAMIN B12) 1000 MCG/ML injection Inject 1 mL (1,000 mcg) into the muscle every 30 days Inject  intramuscular every 4 weeks.  May give multi-use vial. 10 mL 5     EPIPEN 2-JOSE 0.3 MG/0.3ML injection Inject 0.3 mg into the muscle once as needed        famotidine (PEPCID) 10 MG tablet Take 10 mg by mouth daily       FOLIC ACID PO Take 3 mg by mouth daily       HEPARIN SODIUM, PORCINE, IJ        hydrochlorothiazide (HYDRODIURIL) 25 MG tablet Take 1 tablet (25 mg) by mouth daily TAKE ONE TABLET BY MOUTH ONE TIME DAILY (Patient taking differently: Take 25 mg by mouth daily) 90 tablet 3     hydrOXYzine (ATARAX) 25 MG tablet Take 1-4 tablets ( mg) by mouth every 6 hours as needed for anxiety 120 tablet 1     ipratropium - albuterol 0.5 mg/2.5 mg/3 mL (DUONEB) 0.5-2.5 (3) MG/3ML nebulization Take 1 vial (3 mLs) by nebulization every 4 hours as needed for shortness of breath / dyspnea or wheezing 1 Box 11     losartan (COZAAR) 50 MG tablet Take 50 mg by  mouth daily       methotrexate 50 MG/2ML injection Inject 0.8 mLs (20 mg) Subcutaneous once a week Hold until resumed by rheumatology. Previously receiving weekly on mondays       methylPREDNISolone (MEDROL) 32 MG tablet Please take 1 tablet 16 hours and 2 hours before your CT scan 2 tablet 0     OMALIZUMAB SC Inject 300 mg Subcutaneous       ondansetron (ZOFRAN) 2 MG/ML SOLN injection Inject 2 mLs (4 mg) into the vein as needed 3 TIMES WEEKLY PRN with PRN IV infusions  May repeat x 1 per individual infusion.  Please page the nurse at [phone] or call [phone] Option 4 with questions about this order. 2000 mL 20     ondansetron (ZOFRAN-ODT) 8 MG disintegrating tablet Take 1 tablet (8 mg) by mouth every 8 hours as needed for nausea 1 tablet 3 times daily. If not covered, try solution at same dosing. 90 tablet 3     pantoprazole (PROTONIX) 40 MG EC tablet Take 1 tablet (40 mg) by mouth daily 90 tablet 3     Prenatal Vit-Fe Fumarate-FA (PRENATAL MULTIVITAMIN  PLUS IRON) 27-0.8 MG TABS Take 1 tablet by mouth daily       tiZANidine (ZANAFLEX) 4 MG tablet Take 1 tablet (4 mg) by mouth every 6 hours as needed for muscle spasms TAKE ONE TABLET BY MOUTH EVERY SIX HOURS AS NEEDED FOR MUSCLE SPASM 90 tablet 3     tocilizumab (ACTEMRA) 80 MG/4ML Reported on 3/16/2017       No facility-administered encounter medications on file as of 9/29/2022.        Allergies   Allergen Reactions     Propranolol Shortness Of Breath            Compazine Hives     Severe muscle cramping and bad anxiety     Fluoxetine Other (See Comments)     Seizures     Gabapentin Swelling     Swelling of hands and feet     Lyrica Swelling     Metoclopramide Other (See Comments)     Very irritable.  Very irritable.     Pregabalin Swelling     Prochlorperazine Other (See Comments)     Severe muscle cramping and bad anxiety     Reglan Other (See Comments)     Very irritable.     Vancomycin Other (See Comments)     Able to tolerate with pre-medication  "(diphenhydramine) and slow rate.  \"crissy Syndrome\"     Wheat Diarrhea     Wheat Bran GI Disturbance     Adhesive Tape Rash     Skin irritation     Contrast Dye Rash     Ct contrast dye  Rash over entire trunk     Diatrizoate Rash     CT contrast  Ct contrast dye  Rash over entire trunk     Dye Fdc Red [Red Dye] Rash     FD&C RED #40     Latex Hives and Rash     Liquid Adhesive Rash     Other [No Clinical Screening - See Comments] Rash     Diatrizoate meglumine     Penicillins Rash        Review of systems:  A full 10 point review of systems was obtained and was negative except for the pertinent positives and negatives stated within the HPI.    Objective Findings:   Physical Exam:    Constitutional: Wt 71.7 kg (158 lb)   LMP 11/21/2001   BMI 29.85 kg/m    General: Alert, cooperative, no distress, well-appearing  Head: Atraumatic, normocephalic, no obvious abnormalities   Eyes: EOMI, Sclera anicteric, no obvious conjunctival hemorrhage   Nose: Nares normal, no obvious malformation, no obvious rhinorrhea   Respiratory: normal appearing respirations, no cough  Musculoskeletal: Range of motion intact, no obvious strength deficit  Skin: No jaundice, no obvious rash  Neurologic: AAOx3, no obvious neurologic abnormality  Psychiatric: Normal Affect, appropriate mood  Extremities: No obvious edema, no obvious malformation     Labs, Radiology, Pathology     Lab Results   Component Value Date    WBC 7.3 12/21/2016    WBC 4.1 11/10/2016    WBC 11.5 (H) 11/09/2016    HGB 14.3 12/23/2016    HGB 14.3 12/21/2016    HGB 13.0 11/10/2016     12/23/2016     12/21/2016     11/10/2016    CHOL 181 03/21/2014    TRIG 77 03/21/2014    HDL 66 03/21/2014    ALT 26 11/09/2016    ALT 20 08/12/2016    ALT 27 07/22/2016    AST 20 11/09/2016    AST 12 08/12/2016    AST 18 07/22/2016     12/21/2016     11/09/2016     08/16/2016    BUN 13 12/21/2016    BUN 9 11/09/2016    BUN 7 08/16/2016    CO2 25 12/21/2016 "    CO2 24 11/09/2016    CO2 24 08/16/2016    TSH 0.60 05/15/2016    INR 1.13 09/12/2016    INR 1.20 (H) 08/12/2016    INR 2.28 (H) 07/19/2016        Liver Function Studies -   Recent Labs   Lab Test 05/22/19  1020 11/09/16  0819   PROTTOTAL  --  6.9   ALBUMIN 4.0 4.1   BILITOTAL  --  0.8   ALKPHOS  --  60   AST  --  20   ALT  --  26          Patient Active Problem List    Diagnosis Date Noted     Absence of both breasts 09/27/2022     Priority: Medium     Added automatically from request for surgery [id number]       Esophageal dysphagia 05/25/2022     Priority: Medium     Globus sensation 05/25/2022     Priority: Medium     History of Nissen fundoplication 05/25/2022     Priority: Medium     GERD (gastroesophageal reflux disease) 12/24/2016     Priority: Medium     Chronic gastroesophageal reflux disease 12/23/2016     Priority: Medium     Chronic urticaria 11/22/2016     Priority: Medium     History of recurrent urinary tract infection 11/10/2016     Priority: Medium     Fever, intermittent 08/13/2016     Priority: Medium     Encounter for blood transfusion 07/19/2016     Priority: Medium     Bacteremia 06/25/2016     Priority: Medium     E. coli       Pyelonephritis due to Escherichia coli 05/15/2016     Priority: Medium     Anemia, iron deficiency 04/21/2016     Priority: Medium     Finger stiffness, left 08/24/2015     Priority: Medium     Brachial neuritis or radiculitis 07/27/2015     Priority: Medium     Problem list name updated by automated process. Provider to review       Pain of left hand 07/27/2015     Priority: Medium     Mechanical problems with limbs 07/27/2015     Priority: Medium     Hand weakness 07/27/2015     Priority: Medium     Vomiting 06/15/2015     Priority: Medium     Primary hypercoagulable state [289.81] 05/26/2015     Priority: Medium     Pt had PE 1+ year ago after surgery complicated by pneumonia, and has been on warfarin since.  She also had DVT after pregnancy. Both of these were  provoked episodes and extensive hypercoagulability work-up was negative. At this point, okay to stop warfarin, as clots were provoked and she has been anti-coagulated for >1 year. Would would recommend aggressive VTE prophylaxis whenever she is at risk (immobilization, surgery, trauma, etc).        TOS (thoracic outlet syndrome) 05/01/2015     Priority: Medium     Mastocytosis 04/01/2015     Priority: Medium     House dust mite allergy      Priority: Medium     2/19/15 IgE tests pos. for: cat/dog/DM/T/G/RW, NEG  for fish and shellfish.       Allergic rhinitis due to animal dander      Priority: Medium     Seasonal allergic rhinitis      Priority: Medium     Diagnostic skin and sensitization tests (aka ALLERGENS)      Priority: Medium     Portacath in place 01/14/2015     Priority: Medium     Shift work sleep disorder 01/13/2015     Priority: Medium     Patient is followed by Yasmine eBrrios DNP, RN, APRN  Med:  Temazepam 15mg    diagnosis: shift work disorder  Maximum use per month: #30 - doesn't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

## 2022-09-29 NOTE — PATIENT INSTRUCTIONS
It was a pleasure taking care of you today.  I've included a brief summary of our discussion and care plan from today's visit below.  Please review this information with your primary care provider.  _______________________________________________________________________    My recommendations are summarized as follows:    Please schedule an upper endoscopy with myself or Dr. Hartman (at [place])   Endoscopy will include empiric dilation of the esophagus and possibly Bravo OFF PPI therapy.   You may discontinue PPI as this did not improve your symptoms  Please consider trying the nortriptyline and monitor your symptoms for improvement.     To schedule endoscopic procedures you may call: [phone]  To schedule radiology tests you may call: [phone]  To schedule an ENT appointment you may call: [phone]    Please call my nurse Maura ([phone]), Dimitrios ([phone]) with any questions or concerns.  If you were seen through the [facility] please feel free to reach out to Kiana at [phone]   --    Return to GI Clinic in 4 months to review your progress.    _______________________________________________________________________    Who do I call with any questions after my visit?  Please be in touch if there are any further questions that arise following today's visit.  There are multiple ways to contact your gastroenterology care team.      During business hours, you may reach a Gastroenterology nurse at [phone] and choose option 3.       To schedule or reschedule an appointment, please call [phone].     You can always send a secure message through [employer].  [employer] messages are answered by your nurse or doctor typically within 24 hours.  Please allow extra time on weekends and holidays.      For urgent/emergent questions after business hours, you may reach the on-call GI Fellow by contacting the [facility]  at [phone].     How will I get the results of any  tests ordered?    You will receive all of your results.  If you have signed up for [employer], any tests ordered at your visit will be available to you after your physician reviews them.  Typically this takes 1-2 weeks.  If there are urgent results that require a change in your care plan, your physician or nurse will call you to discuss the next steps.      What is [employer]?  1[employer] is a secure way for you to access all of your healthcare records from the [facility].  It is a web based computer program, so you can sign on to it from any location.  It also allows you to send secure messages to your care team.  I recommend signing up for 1[employer] access if you have not already done so and are comfortable with using a computer.      How to I schedule a follow-up visit?  If you did not schedule a follow-up visit today, please call [phone] to schedule a follow-up office visit.      If you feel you received exceptional care and are interested in supporting the clinical and research goals of Dr. Orourke or the Division of Gastroenterology, Hepatology, and Nutrition please contact [email or web address] from the [facility] to discuss opportunities to donate.    Sincerely,    Tejas Orourke DO   of Medicine  Director, Esophageal Disorders Program  Division of Gastroenterology, Hepatology, and Nutrition  [facility]

## 2022-09-29 NOTE — PROGRESS NOTES
"Demetri is a 42 year old who is being evaluated via a billable video visit.      How would you like to obtain your AVS? MyChart  If the video visit is dropped, the invitation should be resent by: Text to cell phone: [phone]  Will anyone else be joining your video visit? No        Video-Visit Details    Video Start Time: 1:16 PM    Type of service:  Video Visit    Video End Time:1:44 PM    Originating Location (pt. Location): Home    Distant Location (provider location):  [facility] [place]     Platform used for Video Visit: [facility]         Gastroenterology Visit for: Demetri Mosley 1980   MRN: [id number]     Reason for Visit:  chief complaint    Referred by: Nat  / [address] [place] [address] 195 / [facility] [id number]  Patient Care Team:  Nikolay Baeza as PCP - General (Family Practice)  Antonio Hoskins MD as MD (Hematology & Oncology)  Chris Boyce MD as MD (Hand Surgery)  Yasmine Berrios APRN CNP as Nurse Practitioner (Nurse Practitioner - Adult Health)  Dada Ge MD as MD (Specialist)  Lesvia Garcia MD as MD (Orthopedics)  Alesha Johns APRN CNS as Registered Nurse (Clinical Nurse Specialist)  Tejas Orourke DO as MD (Gastroenterology)  Tejas Orourke DO as Assigned Gastroenterology Provider  Quinn Bowden MD as Assigned Surgical Provider    History of Present Illness:   Demetri Mosley is a 42 year old female with allergies, \"GI issues forever\", RA, 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 Johns with a chief complaint of globus, and dysphagia.     9/29/22  No change in globus and food catching sensation is unchanged. Taking PPI BID with no change in symptoms. Globus persists. Stellate ganglion injection helps for at most a few days after she gets one however it has been awhile due to scheduling. Allergic to " gabapentin and pedal edema with lyrica. Has a prescription for nortriptyline. Not interested in mirtazapine due to weight gain side effect. Last dilation was done in 2016 after nissen before globus sensation.     ---------------------------------------------------------------  5/24/22  Demetri Mosley states 4-5 years ago thought she swallowed fish bone and had the sensation of something in her esophagus. Thought she had reflux based on ENT scope. She feels that food gets stuck occasionally. It has been years since a dilation. She felt that dilation in the past helped but that was mid sternal sensation compared to now which is upper esophageal. Gets stellate ganglion blocks for CRPS. Symptoms got better following injection. Following injection symptoms were less and is getting injections every other week. Leading up to an injection she can be at a 10/10 and injection takes it down to a 2/10. Sensation is present always but worse when she swallows. Last scope was prior to this current sensation    Had distal esophageal dysphagia and barium tab get stuck. Had HRM at that time. 2019 HRM was performed while having the upper esophageal symptoms and did not identify an etiology.    Has port for infusions for RA. Able to access for procedures.     The patient states she has been scoped a lot. Prior nissen (2015) performed at the [facility]. Two prior ENTs ([facility] and private practice)    20 allergies: gabapentin allergy  TCAs are too sedating for the patient currently in grad school for public health. Patient literacy of chronic respiratory disease pre/post genetic sequencing.     PROPOFOL REQUIRED  ---------------------------------------------------------------     Demetri Mosley denies  odynophagia,  nausea, vomiting, early satiety, abdominal pain, abdominal distension/bloating, diarrhea, constipation, hematochezia, or melena. No unintentional weight loss.     Wt Readings from Last 5 Encounters:   09/29/22 71.7 kg (158  lb)   08/19/22 72.8 kg (160 lb 6.4 oz)   08/08/22 73.9 kg (163 lb)   05/22/19 74 kg (163 lb 1.6 oz)   02/06/19 74 kg (163 lb 3.2 oz)        Esophageal Questionnaire(s)    BEDQ Questionnaire  BEDQ Questionnaire: How Often Have You Had the Following? 5/24/2022   Trouble eating solid food (meat, bread, vegetables) 4   Trouble eating soft foods (yogurt, jello, pudding) 0   Trouble swallowing liquids 0   Pain while swallowing 5   Coughing or choking while swallowing foods or liquids 2   Total Score: 11     BEDQ Questionnaire: Discomfort/Pain Ratings 5/24/2022   Eating solid food (meat, bread, vegetables) 2   Eating soft foods (yogurt, jello, pudding) 0   Drinking liquid 0   Total Score: 2       Eckardt Questionnaire  Eckardt Questionnaire 5/24/2022   Dysphagia 2   Regurgitation 1   Retrosternal Pain 1   Weight Loss (kg) 0   Total Score:  4       Promis 10 Questionnaire  PROMIS 10 FLOWSHEET DATA 6/30/2015 5/24/2022   In general, would you say your health is: 2 3   In general, would you say your quality of life is: 3 3   In general, how would you rate your physical health? 2 3   In general, how would you rate your mental health, including your mood and your ability to think? 3 3   In general, how would you rate your satisfaction with your social activities and relationships? 2 2   In general, please rate how well you carry out your usual social activities and roles. (This includes activities at home, at work and in your community, and responsibilities as a parent, child, spouse, employee, friend, etc.) 2 3   To what extent are you able to carry out your everyday physical activities such as walking, climbing stairs, carrying groceries, or moving a chair? 2 3   In the past 7 days, how often have you been bothered by emotional problems such as feeling anxious, depressed, or irritable? 3 3   In the past 7 days, how would you rate your fatigue on average? 3 3   In the past 7 days, how would you rate your pain on average, where  0 means no pain, and 10 means worst imaginable pain? 3 5   Mental health question re-calculation - no clinical value - 3   Physical health question re-calculation - no clinical value - 3   Pain question re-calculation - no clinical value 4 3   Global Mental Health Score 11 11   Global Physical Health Score 11 12   PROMIS TOTAL - SUBSCORES 22 23     STUDIES & PROCEDURES:    EGD:   Date 8/8/22  The Z-line was regular and was found 39 cm from the incisors.        A non-bleeding diverticulum with a small opening and no stigmata of        recent bleeding was found in the distal esophagus.        Normal mucosa was found in the entire esophagus. Eight biopsies were        obtained with cold large-capacity forceps for histology randomly in the        proximal esophagus and in the distal esophagus.        The gastroesophageal flap valve was visualized endoscopically and        classified as Hill Grade I (prominent fold, tight to endoscope).        The entire examined stomach was normal.        The duodenal bulb, first portion of the duodenum, second portion of the        duodenum and examined duodenum were normal.                                                                                     Impression:            - Z-line regular, 39 cm from the incisors.                          - Diverticulum in the distal esophagus.                          - Normal mucosa was found in the entire esophagus.                          - Gastroesophageal flap valve classified as Hill Grade                          I (prominent fold, tight to endoscope).                          - Normal stomach.                          - Normal duodenal bulb, first portion of the duodenum,                          second portion of the duodenum and examined duodenum.                          - Eight biopsies were obtained in the proximal                          esophagus and in the distal esophagus.   Pathology  A.  ESOPHAGUS, DISTAL, BIOPSY:  -  Squamous mucosa with mild reactive changes  - No intraepithelial eosinophils identified    B.  ESOPHAGUS, PROXIMAL, BIOPSY:  - Squamous mucosa with mild reactive changes  - No intraepithelial eosinophils identified       EGD  Date: 6/2/16  Impression:   The examined esophagus was normal.        The entire examined stomach was normal.        The examined duodenum was normal. Biopsies were taken with a cold        forceps for evaluation of celiac disease.                                                                                    Impression:          - Normal esophagus.                        - Normal stomach.                        - Normal examined duodenum. Biopsied.   Recommendation:      - Await pathology results.                        - Discharge patient to home.                        - Return to previous diet.   Pathology Report:    Colonoscopy:  Date:  Impression:  Pathology Report:     EndoFLIP directed at the UES or LES (8cm (EF-325) balloon length or 16cm (EF-322) balloon length):   Date:  8cm balloon  Balloon inflation Balloon pressure CSA (mm^2) DI (mm^2/mmHg) Dmin (mm) Compliance   20 (ladmark ID)        30        40        50           16cm balloon  Balloon inflation Balloon pressure CSA (mm^2) DI (mm^2/mmHg) Dmin (mm) Compliance   30 (ladmark ID)        40        50        60        70           High Resolution Manometry:  Date: 5/8/2019  Impression:    High Resolution Manometry  2/17/16    PH/Impedance:  Date: 3/11/16  Impression:       Bravo:  48 or 96hr  Date:  Impression:    CT:  Date:  Impression:    Esophagram:  Date: 1/13/22  Impression:  FINDINGS: A single contrast esophagram was performed. There are no  mucosal abnormalities. There are no strictures or filling defects.  Esophageal motility is intact. No evidence of reflux.     An upper GI was performed and the esophagus, stomach, and duodenum are  unremarkable. Esophageal motility is unremarkable. The rugal pattern  within the stomach  is unremarkable with no ulcer identified. The  duodenum is well-seen in distended and contracted states. The  visualized jejunum is normal. Fundoplication changes. There are no  filling defects seen within the esophagus, stomach, or duodenum. No  spontaneous reflux was seen.                                                                      IMPRESSION: Fundoplication changes with otherwise normal upper GI. No  evidence of reflux.    Video Swallow  6/7/2019    FINDINGS: The swallowing mechanism is within normal limits with no  penetration or aspiration. No pharyngeal mass, web, or diverticulum  are seen.                                                                      IMPRESSION: Normal swallowing mechanism, as described above.    Esophagram   2/6/2019  IMPRESSION:   1. Again seen smooth relative narrowing of the esophagus at the level  of the aortic arch with difficulty passing a barium tablet at this  level. The tablet passed distally with multiple sips. No mucosal   abnormality or mass at this level.  2. Also redemonstration of the narrowing at the gastroesophageal  junction secondary to Nissen fundoplication causes barium tablet.  Eventual passage with multiple sips.  3. No gastroesophageal reflux was appreciated.     Prior medical records were reviewed including, but not limited to, notes from referring providers, lab work, radiographic tests, and other diagnostic tests. Pertinent results were summarized above.     History     Past Medical History:   Diagnosis Date     Allergic rhinitis due to animal dander      Anemia      Arthritis     rheumatoid     Chronic abdominal pain      Cyclic vomiting syndrome      Diagnostic skin and sensitization tests (aka ALLERGENS) 2/19/15 IgE tests pos. for: cat/dog/DM/T/G/RW, NEG  for fish and shellfish.     DVT (deep venous thrombosis) (H) 2005    s/p long hospitalization - anticoagulated x 6 months     Endometriosis 2009    total hysterectomy     Gastro-oesophageal  reflux disease      House dust mite allergy     2/19/15 IgE tests pos. for: cat/dog/DM/T/G/RW, NEG  for fish and shellfish.     Migraines      parotid mass 2012    benign, left, removed     PONV (postoperative nausea and vomiting)      Pyelonephritis due to Escherichia coli 5/15/2016     Uncomplicated asthma        Past Surgical History:   Procedure Laterality Date     CHOLECYSTECTOMY       COLONOSCOPY N/A 6/2/2016    Procedure: COLONOSCOPY;  Surgeon: Edvin Summers MD;  Location:  GI     ENT SURGERY      T&A     ESOPHAGOSCOPY, GASTROSCOPY, DUODENOSCOPY (EGD), COMBINED N/A 6/2/2016    Procedure: COMBINED ESOPHAGOSCOPY, GASTROSCOPY, DUODENOSCOPY (EGD), BIOPSY SINGLE OR MULTIPLE;  Surgeon: Edvin Summers MD;  Location: [facility]     ESOPHAGOSCOPY, GASTROSCOPY, DUODENOSCOPY (EGD), COMBINED N/A 8/8/2022    Procedure: ESOPHAGOGASTRODUODENOSCOPY, WITH BIOPSY;  Surgeon: Tejas Orourke DO;  Location: [facility] OR     ESOPHAGOSCOPY, GASTROSCOPY, DUODENOSCOPY (EGD), DILATATION, COMBINED N/A 2/1/2017    Procedure: COMBINED ESOPHAGOSCOPY, GASTROSCOPY, DUODENOSCOPY (EGD), DILATATION;  Surgeon: Corry Franco MD;  Location:  OR     GI SURGERY      ERCP     GYN SURGERY      hysterectomy     HC CAPSULE ENDOSCOPY N/A 9/13/2016    Procedure: CAPSULE/PILL CAM ENDOSCOPY;  Surgeon: Iraj Choe MD;  Location:  GI      ESOPH/GAS REFLUX TEST W NASAL IMPED >1 HR N/A 2/17/2016    Procedure: ESOPHAGEAL IMPEDENCE FUNCTION TEST WITH 24 HOUR PH GREATER THAN 1 HOUR;  Surgeon: Charan Ozuna MD;  Location[facility] UGI ENDOSCOPY W EUS  5/30/2013    Procedure: COMBINED ENDOSCOPIC ULTRASOUND, ESOPHAGOSCOPY, GASTROSCOPY, DUODENOSCOPY (EGD);  Surgeon: Juan Yu MD;  Location:  GI     HEAD & NECK SURGERY      tumor removed     HYSTERECTOMY, PAP NO LONGER INDICATED       INJECT BLOCK MEDIAL BRANCH CERVICAL/THORACIC/LUMBAR Bilateral 3/25/2015    Procedure: INJECT BLOCK MEDIAL BRANCH CERVICAL / THORACIC /  LUMBAR;  Surgeon: Malvin Rivera MD;  Location:  OR     INSERT PORT VASCULAR ACCESS N/A 2016    Procedure: INSERT PORT VASCULAR ACCESS;  Surgeon: Juani Casiano MD;  Location: [facility] OR     LAPAROSCOPIC NISSEN FUNDOPLICATION N/A 2016    Procedure: LAPAROSCOPIC NISSEN FUNDOPLICATION;  Surgeon: Noah Brito MD;  Location: [facility] OR     ORTHOPEDIC SURGERY       RESECT FIRST RIB Left 2015    Procedure: RESECT FIRST RIB;  Surgeon: Tien Yang MD;  Location:  OR       Social History     Socioeconomic History     Marital status:      Spouse name: Not on file     Number of children: Not on file     Years of education: Not on file     Highest education level: Not on file   Occupational History     Not on file   Tobacco Use     Smoking status: Former Smoker     Packs/day: 0.25     Years: 5.00     Pack years: 1.25     Types: Cigarettes     Quit date: 2010     Years since quittin.3     Smokeless tobacco: Never Used   Substance and Sexual Activity     Alcohol use: Yes     Comment: social - not that often.  Maybe one glass/week.     Drug use: No     Sexual activity: Yes     Partners: Male     Birth control/protection: Surgical   Other Topics Concern     Parent/sibling w/ CABG, MI or angioplasty before 65F 55M? 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 She lives with her  and 3 children in a house with carpet. She shares yardwork detail with her . They have a dog and a cat.      Social Determinants of Health     Financial Resource Strain: Not on file   Food Insecurity: Not on file   Transportation Needs: Not on file   Physical Activity: Not on file   Stress: Not on file   Social Connections: Not on file   Intimate Partner Violence: Not on file   Housing Stability: Not on file       Family History   Problem Relation Age of Onset     Breast Cancer Mother      Cardiovascular Mother      Depression Mother       Lipids Mother      C.A.D. Father      Neurologic Disorder Father      Cardiovascular Father      Other - See Comments Son         Autism      Family history reviewed and edited as appropriate    Medications and Allergies:     Outpatient Encounter Medications as of 9/29/2022   Medication Sig Dispense Refill     albuterol (ALBUTEROL) 108 (90 BASE) MCG/ACT inhaler Inhale 2 puffs into the lungs every 4 hours as needed for shortness of breath / dyspnea 1 Inhaler 5     alteplase (CATHFLO ACTIVASE) 2 MG injection by Injection route ONCE.       cetirizine (ZYRTEC) 10 MG tablet Take 4 tablets (40 mg) by mouth At Bedtime 30 tablet      cyanocobalamin (VITAMIN B12) 1000 MCG/ML injection Inject 1 mL (1,000 mcg) into the muscle every 30 days Inject  intramuscular every 4 weeks.  May give multi-use vial. 10 mL 5     EPIPEN 2-JOSE 0.3 MG/0.3ML injection Inject 0.3 mg into the muscle once as needed        famotidine (PEPCID) 10 MG tablet Take 10 mg by mouth daily       FOLIC ACID PO Take 3 mg by mouth daily       HEPARIN SODIUM, PORCINE, IJ        hydrochlorothiazide (HYDRODIURIL) 25 MG tablet Take 1 tablet (25 mg) by mouth daily TAKE ONE TABLET BY MOUTH ONE TIME DAILY (Patient taking differently: Take 25 mg by mouth daily) 90 tablet 3     hydrOXYzine (ATARAX) 25 MG tablet Take 1-4 tablets ( mg) by mouth every 6 hours as needed for anxiety 120 tablet 1     ipratropium - albuterol 0.5 mg/2.5 mg/3 mL (DUONEB) 0.5-2.5 (3) MG/3ML nebulization Take 1 vial (3 mLs) by nebulization every 4 hours as needed for shortness of breath / dyspnea or wheezing 1 Box 11     losartan (COZAAR) 50 MG tablet Take 50 mg by mouth daily       methotrexate 50 MG/2ML injection Inject 0.8 mLs (20 mg) Subcutaneous once a week Hold until resumed by rheumatology. Previously receiving weekly on mondays       methylPREDNISolone (MEDROL) 32 MG tablet Please take 1 tablet 16 hours and 2 hours before your CT scan 2 tablet 0     OMALIZUMAB SC Inject 300 mg  "Subcutaneous       ondansetron (ZOFRAN) 2 MG/ML SOLN injection Inject 2 mLs (4 mg) into the vein as needed 3 TIMES WEEKLY PRN with PRN IV infusions  May repeat x 1 per individual infusion.  Please page the nurse at [phone] or call [phone] Option 4 with questions about this order. 2000 mL 20     ondansetron (ZOFRAN-ODT) 8 MG disintegrating tablet Take 1 tablet (8 mg) by mouth every 8 hours as needed for nausea 1 tablet 3 times daily. If not covered, try solution at same dosing. 90 tablet 3     pantoprazole (PROTONIX) 40 MG EC tablet Take 1 tablet (40 mg) by mouth daily 90 tablet 3     Prenatal Vit-Fe Fumarate-FA (PRENATAL MULTIVITAMIN  PLUS IRON) 27-0.8 MG TABS Take 1 tablet by mouth daily       tiZANidine (ZANAFLEX) 4 MG tablet Take 1 tablet (4 mg) by mouth every 6 hours as needed for muscle spasms TAKE ONE TABLET BY MOUTH EVERY SIX HOURS AS NEEDED FOR MUSCLE SPASM 90 tablet 3     tocilizumab (ACTEMRA) 80 MG/4ML Reported on 3/16/2017       No facility-administered encounter medications on file as of 9/29/2022.        Allergies   Allergen Reactions     Propranolol Shortness Of Breath            Compazine Hives     Severe muscle cramping and bad anxiety     Fluoxetine Other (See Comments)     Seizures     Gabapentin Swelling     Swelling of hands and feet     Lyrica Swelling     Metoclopramide Other (See Comments)     Very irritable.  Very irritable.     Pregabalin Swelling     Prochlorperazine Other (See Comments)     Severe muscle cramping and bad anxiety     Reglan Other (See Comments)     Very irritable.     Vancomycin Other (See Comments)     Able to tolerate with pre-medication (diphenhydramine) and slow rate.  \"crissy Syndrome\"     Wheat Diarrhea     Wheat Bran GI Disturbance     Adhesive Tape Rash     Skin irritation     Contrast Dye Rash     Ct contrast dye  Rash over entire trunk     Diatrizoate Rash     CT contrast  Ct contrast dye  Rash over entire trunk     Dye Fdc Red [Red Dye] Rash     FD&C " RED #40     Latex Hives and Rash     Liquid Adhesive Rash     Other [No Clinical Screening - See Comments] Rash     Diatrizoate meglumine     Penicillins Rash        Review of systems:  A full 10 point review of systems was obtained and was negative except for the pertinent positives and negatives stated within the HPI.    Objective Findings:   Physical Exam:    Constitutional: Wt 71.7 kg (158 lb)   LMP 11/21/2001   BMI 29.85 kg/m    General: Alert, cooperative, no distress, well-appearing  Head: Atraumatic, normocephalic, no obvious abnormalities   Eyes: EOMI, Sclera anicteric, no obvious conjunctival hemorrhage   Nose: Nares normal, no obvious malformation, no obvious rhinorrhea   Respiratory: normal appearing respirations, no cough  Musculoskeletal: Range of motion intact, no obvious strength deficit  Skin: No jaundice, no obvious rash  Neurologic: AAOx3, no obvious neurologic abnormality  Psychiatric: Normal Affect, appropriate mood  Extremities: No obvious edema, no obvious malformation     Labs, Radiology, Pathology     Lab Results   Component Value Date    WBC 7.3 12/21/2016    WBC 4.1 11/10/2016    WBC 11.5 (H) 11/09/2016    HGB 14.3 12/23/2016    HGB 14.3 12/21/2016    HGB 13.0 11/10/2016     12/23/2016     12/21/2016     11/10/2016    CHOL 181 03/21/2014    TRIG 77 03/21/2014    HDL 66 03/21/2014    ALT 26 11/09/2016    ALT 20 08/12/2016    ALT 27 07/22/2016    AST 20 11/09/2016    AST 12 08/12/2016    AST 18 07/22/2016     12/21/2016     11/09/2016     08/16/2016    BUN 13 12/21/2016    BUN 9 11/09/2016    BUN 7 08/16/2016    CO2 25 12/21/2016    CO2 24 11/09/2016    CO2 24 08/16/2016    TSH 0.60 05/15/2016    INR 1.13 09/12/2016    INR 1.20 (H) 08/12/2016    INR 2.28 (H) 07/19/2016        Liver Function Studies -   Recent Labs   Lab Test 05/22/19  1020 11/09/16  0819   PROTTOTAL  --  6.9   ALBUMIN 4.0 4.1   BILITOTAL  --  0.8   ALKPHOS  --  60   AST  --  20   ALT   --  26          Patient Active Problem List    Diagnosis Date Noted     Absence of both breasts 09/27/2022     Priority: Medium     Added automatically from request for surgery [id number]       Esophageal dysphagia 05/25/2022     Priority: Medium     Globus sensation 05/25/2022     Priority: Medium     History of Nissen fundoplication 05/25/2022     Priority: Medium     GERD (gastroesophageal reflux disease) 12/24/2016     Priority: Medium     Chronic gastroesophageal reflux disease 12/23/2016     Priority: Medium     Chronic urticaria 11/22/2016     Priority: Medium     History of recurrent urinary tract infection 11/10/2016     Priority: Medium     Fever, intermittent 08/13/2016     Priority: Medium     Encounter for blood transfusion 07/19/2016     Priority: Medium     Bacteremia 06/25/2016     Priority: Medium     E. coli       Pyelonephritis due to Escherichia coli 05/15/2016     Priority: Medium     Anemia, iron deficiency 04/21/2016     Priority: Medium     Finger stiffness, left 08/24/2015     Priority: Medium     Brachial neuritis or radiculitis 07/27/2015     Priority: Medium     Problem list name updated by automated process. Provider to review       Pain of left hand 07/27/2015     Priority: Medium     Mechanical problems with limbs 07/27/2015     Priority: Medium     Hand weakness 07/27/2015     Priority: Medium     Vomiting 06/15/2015     Priority: Medium     Primary hypercoagulable state [289.81] 05/26/2015     Priority: Medium     Pt had PE 1+ year ago after surgery complicated by pneumonia, and has been on warfarin since.  She also had DVT after pregnancy. Both of these were provoked episodes and extensive hypercoagulability work-up was negative. At this point, okay to stop warfarin, as clots were provoked and she has been anti-coagulated for >1 year. Would would recommend aggressive VTE prophylaxis whenever she is at risk (immobilization, surgery, trauma, etc).        TOS (thoracic outlet syndrome)  05/01/2015     Priority: Medium     Mastocytosis 04/01/2015     Priority: Medium     House dust mite allergy      Priority: Medium     2/19/15 IgE tests pos. for: cat/dog/DM/T/G/RW, NEG  for fish and shellfish.       Allergic rhinitis due to animal dander      Priority: Medium     Seasonal allergic rhinitis      Priority: Medium     Diagnostic skin and sensitization tests (aka ALLERGENS)      Priority: Medium     Portacath in place 01/14/2015     Priority: Medium     Shift work sleep disorder 01/13/2015     Priority: Medium     Patient is followed by Yasmine Berrios DNP, RN, APRN  Med:  Temazepam 15mg    diagnosis: shift work disorder  Maximum use per month: #30 - doesn't always use every day  Expected duration: lifetime  Narcotic agreement on file:  YES - contract signed   Clinic visit recommended: Q6 month      Med:  Modifinil   diagnosis: shift work disorder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

## 2022-09-30 NOTE — TELEPHONE ENCOUNTER
RN noted pt read [employer] message on 9/29. Will reopen encounter if pt has further questions or concerns..Mirian Uribe RN

## 2022-10-04 ENCOUNTER — TELEPHONE (OUTPATIENT)
Dept: GASTROENTEROLOGY | Facility: CLINIC | Age: 42
End: 2022-10-04

## 2022-10-04 NOTE — TELEPHONE ENCOUNTER
Screening Questions  BLUE  KIND OF PREP RED  LOCATION [review exclusion criteria] GREEN  SEDATION TYPE        Yes Are you active on mychart?       Sharad Ordering/Referring Provider?        [facility] What type of coverage do you have?      N Have you had a positive covid test in the last 90 days?     1. 29.85 BMI  [BMI 40+ - review exclusion criteria]    2. Yes  Are you able to give consent for your medical care? [IF NO,RN REVIEW]        3. N  Are you taking any prescription pain medications on a routine schedule?        3a. NA EXTENDED PREP What kind of prescription?   4. N Do you have any chemical dependencies such as alcohol, street drugs, or methadone?    5. Yes-anxiety Do you have any history of post-traumatic stress syndrome, severe anxiety or history of psychosis?      **If yes 3- 5 , please schedule with MAC sedation.**          IF YES TO ANY 6 - 10 - HOSPITAL SETTING ONLY.     6.   N Do you need assistance transferring?     7.   N Have you had a heart or lung transplant?    8.   N Are you currently on dialysis?   9.   N Do you use daily home oxygen?   10. N Do you take nitroglycerin?   10a. NA If yes, how often?     11. [FEMALES]  N Are you currently pregnant?    11a. NA If yes, how many weeks? [ Greater than 12 weeks, OR NEEDED]    12. N Do you have Pulmonary Hypertension? *NEED PAC APPT AT [facility]*     13. N-but does have post in Left Upper Chest [review exclusion criteria]  Do you have any implantable devices in your body (pacemaker, defib, LVAD)?    14. N In the past 6 months, have you had any heart related issues including cardiomyopathy or heart attack?     14a.  If yes, did it require cardiac stenting if so when?     15. N Have you had a stroke or Transient ischemic attack (TIA - aka  mini stroke ) within 6 months?      16. N Do you have mod to severe Obstructive Sleep Apnea?  [Hospital only - Ok at [facility]]    17. N Do you have SEVERE AND UNCONTROLLED asthma? *NEED PAC APPT AT [facility]*     18. N Are  "you currently taking any blood thinners?     18a. If yes, inform patient to \"follow up w/ ordering provider for bridging instructions.\"    19. N Do you take the medication Phentermine?    19a. If yes, \"Hold for 7 days before procedure.  Please consult your prescribing provider if you have questions about holding this medication.\"     20. N  Do you have chronic kidney disease?      21. N-but is on Metformin  Do you have a diagnosis of diabetes?     22. NA  On a regular basis do you go 3-5 days between bowel movements?     23.  Preferred LOCAL Pharmacy for Pre Prescription    [ LIST ONLY ONE PHARMACY]          WRITTEN PRESCRIPTION   [facility] - [place], [place] - [address]        - CLOSING REMINDERS -    Informed patient they will need an adult    Cannot take any type of public or medical transportation alone    Conscious Sedation- Needs  for 6 hours after the procedure       MAC/General-Needs  for 24 hours after procedure    Pre-Procedure Covid test to be completed [[facility] PCR Testing Required]    Confirmed Nurse will call to complete assessment       - SCHEDULING DETAILS -     Sharad  Surgeon    1/19/23  Date of Procedure  Upper Endoscopy with BRAVO [EGD BRAVO]  Type of Procedure Scheduled   [facility] Location    MAC Sedation Type     NO PAC / Pre-op Required         Additional comments:            "

## 2022-10-15 ENCOUNTER — HEALTH MAINTENANCE LETTER (OUTPATIENT)
Age: 42
End: 2022-10-15

## 2022-11-28 NOTE — PROGRESS NOTES
This is a recent snapshot of the patient's [facility] medical record.  For current drug dose and complete information and questions, call [phone] or In Basket pool, [facility] home infusion ([id number])  CSN Number:  [id number]

## 2022-12-07 RX ORDER — TIRZEPATIDE 2.5 MG/.5ML
2.5 INJECTION, SOLUTION SUBCUTANEOUS
COMMUNITY
Start: 2022-12-02 | End: 2023-04-17

## 2022-12-07 RX ORDER — DEXTROAMPHETAMINE SACCHARATE, AMPHETAMINE ASPARTATE MONOHYDRATE, DEXTROAMPHETAMINE SULFATE AND AMPHETAMINE SULFATE 2.5; 2.5; 2.5; 2.5 MG/1; MG/1; MG/1; MG/1
10 CAPSULE, EXTENDED RELEASE ORAL EVERY MORNING
COMMUNITY
Start: 2022-09-22

## 2022-12-13 ENCOUNTER — ANESTHESIA EVENT (OUTPATIENT)
Dept: SURGERY | Facility: AMBULATORY SURGERY CENTER | Age: 42
End: 2022-12-13
Payer: COMMERCIAL

## 2022-12-13 RX ORDER — CEPHALEXIN 500 MG/1
500 CAPSULE ORAL 2 TIMES DAILY
Qty: 6 CAPSULE | Refills: 0 | Status: CANCELLED | OUTPATIENT
Start: 2022-12-13 | End: 2022-12-16

## 2022-12-13 NOTE — DISCHARGE INSTRUCTIONS
Plastic Surgery Discharge Instructions    Patient has been treated with a breast revision with scar revision Dr. Bowden on 12/14/22.     Care: Please wear the supportive ace at all times. Please leave in place for at least 36-48 hours. You can shower after this time frame, but keep the incision dressing as dry as possible. Do not rub the incisions. When you are done showering, gently pad dry and wear the ace wrap/lightly compressive sports bra at all times, and particularly when ambulating upright. No underwire bras. You can loosen supportive bra when lying flat and resting in bed. Please make sure you ambulate a few times per day and move your ankles when resting. Elevate the feet when resting.     Medications: You can take Ibuprofen 400-800 mg and Tylenol 650 mg for pain relief. Please take each every 6 hours, and for optimal pain relief - please stagger the medications so that you are taking one or the other every 3 hours. If you have been prescribed additional pain medications or muscle relaxants, please take as instructed and as needed. If you are taking additional pain medications, please do not exceed 4000 mg of Tylenol daily from all sources. Also, if you are taking narcotic medications, please do not operate heavy machinery or drive. If you have been prescribed antibiotics, please also take as instructed and for the first few days after surgery.     Diet: Start with clear liquids and slow down if nauseated. Advance the diet as tolerated.    Weight-bearing: You can use your arms. No heavy lifting. No strenuous activities. For 2 weeks, do not elevate your heart rate above 100 beats per minute and no lifting greater than 3-5 pounds. After your first clinic visit, we will discuss advancing your activity level to include cardio workout and more lifting.     ER Instructions: Please call the office and/or consider return to the ER if you experience worsening pain not relieved by medications, increased swelling,  redness or high fevers >101F or if there are unexpected problems like shortness of breath.    Post-op follow-up: Clinic in 2 weeks with Dr. Bowden at the [facility]    Quinn Bowden MD, PhD      [facility]  Same-Day Surgery   Adult Discharge Orders & Instructions   For 24 hours after surgery  Get plenty of rest.  A responsible adult must stay with you for at least 24 hours after you leave the hospital.   Do not drive or use heavy equipment.  If you have weakness or tingling, don't drive or use heavy equipment until this feeling goes away.  Do not drink alcohol.  Avoid strenuous or risky activities.  Ask for help when climbing stairs.   You may feel lightheaded.  IF so, sit for a few minutes before standing.  Have someone help you get up.   If you have nausea (feel sick to your stomach): Drink only clear liquids such as apple juice, ginger ale, broth or 7-Up.  Rest may also help.  Be sure to drink enough fluids.  Move to a regular diet as you feel able.  You may have a slight fever. Call the doctor if your fever is over 100 F (37.7 C) (taken under the tongue) or lasts longer than 24 hours.  You may have a dry mouth, a sore throat, muscle aches or trouble sleeping.  These should go away after 24 hours.  Do not make important or legal decisions.   Call your doctor for any of the followin.  Signs of infection (fever, growing tenderness at the surgery site, a large amount of drainage or bleeding, severe pain, foul-smelling drainage, redness, swelling).    2. It has been over 8 to 10 hours since surgery and you are still not able to urinate (pass water).    3.  Headache for over 24 hours.

## 2022-12-14 ENCOUNTER — ANESTHESIA (OUTPATIENT)
Dept: SURGERY | Facility: AMBULATORY SURGERY CENTER | Age: 42
End: 2022-12-14
Payer: COMMERCIAL

## 2022-12-14 ENCOUNTER — HOSPITAL ENCOUNTER (OUTPATIENT)
Facility: AMBULATORY SURGERY CENTER | Age: 42
Discharge: HOME | End: 2022-12-14
Attending: STUDENT IN AN ORGANIZED HEALTH CARE EDUCATION/TRAINING PROGRAM | Admitting: STUDENT IN AN ORGANIZED HEALTH CARE EDUCATION/TRAINING PROGRAM
Payer: COMMERCIAL

## 2022-12-14 VITALS
SYSTOLIC BLOOD PRESSURE: 122 MMHG | TEMPERATURE: 98 F | DIASTOLIC BLOOD PRESSURE: 83 MMHG | OXYGEN SATURATION: 97 % | RESPIRATION RATE: 14 BRPM

## 2022-12-14 DIAGNOSIS — Z90.13 ABSENCE OF BOTH BREASTS: ICD-10-CM

## 2022-12-14 LAB — GLUCOSE BLDC GLUCOMTR-MCNC: 85 MG/DL (ref 70–99)

## 2022-12-14 PROCEDURE — 19380 REVJ RECONSTRUCTED BREAST: CPT | Mod: 50 | Performed by: STUDENT IN AN ORGANIZED HEALTH CARE EDUCATION/TRAINING PROGRAM

## 2022-12-14 PROCEDURE — 19380 REVJ RECONSTRUCTED BREAST: CPT | Mod: 50

## 2022-12-14 PROCEDURE — G8907 PT DOC NO EVENTS ON DISCHARG: HCPCS

## 2022-12-14 PROCEDURE — G8916 PT W IV AB GIVEN ON TIME: HCPCS

## 2022-12-14 PROCEDURE — 88302 TISSUE EXAM BY PATHOLOGIST: CPT | Performed by: PATHOLOGY

## 2022-12-14 RX ORDER — FENTANYL CITRATE 50 UG/ML
25 INJECTION, SOLUTION INTRAMUSCULAR; INTRAVENOUS
Status: DISCONTINUED | OUTPATIENT
Start: 2022-12-14 | End: 2022-12-15 | Stop reason: HOSPADM

## 2022-12-14 RX ORDER — CLINDAMYCIN PHOSPHATE 900 MG/50ML
900 INJECTION, SOLUTION INTRAVENOUS SEE ADMIN INSTRUCTIONS
Status: DISCONTINUED | OUTPATIENT
Start: 2022-12-14 | End: 2022-12-15 | Stop reason: HOSPADM

## 2022-12-14 RX ORDER — OXYCODONE HYDROCHLORIDE 5 MG/1
5 TABLET ORAL ONCE
Status: COMPLETED | OUTPATIENT
Start: 2022-12-14 | End: 2022-12-14

## 2022-12-14 RX ORDER — LIDOCAINE 40 MG/G
CREAM TOPICAL
Status: DISCONTINUED | OUTPATIENT
Start: 2022-12-14 | End: 2022-12-15 | Stop reason: HOSPADM

## 2022-12-14 RX ORDER — BUPIVACAINE HYDROCHLORIDE 2.5 MG/ML
INJECTION, SOLUTION INFILTRATION; PERINEURAL PRN
Status: DISCONTINUED | OUTPATIENT
Start: 2022-12-14 | End: 2022-12-14 | Stop reason: HOSPADM

## 2022-12-14 RX ORDER — FENTANYL CITRATE 50 UG/ML
INJECTION, SOLUTION INTRAMUSCULAR; INTRAVENOUS PRN
Status: DISCONTINUED | OUTPATIENT
Start: 2022-12-14 | End: 2022-12-14

## 2022-12-14 RX ORDER — ONDANSETRON 2 MG/ML
INJECTION INTRAMUSCULAR; INTRAVENOUS PRN
Status: DISCONTINUED | OUTPATIENT
Start: 2022-12-14 | End: 2022-12-14

## 2022-12-14 RX ORDER — LIDOCAINE HYDROCHLORIDE 20 MG/ML
INJECTION, SOLUTION INFILTRATION; PERINEURAL PRN
Status: DISCONTINUED | OUTPATIENT
Start: 2022-12-14 | End: 2022-12-14

## 2022-12-14 RX ORDER — ACETAMINOPHEN 325 MG/1
975 TABLET ORAL ONCE
Status: COMPLETED | OUTPATIENT
Start: 2022-12-14 | End: 2022-12-14

## 2022-12-14 RX ORDER — FENTANYL CITRATE 50 UG/ML
50 INJECTION, SOLUTION INTRAMUSCULAR; INTRAVENOUS EVERY 5 MIN PRN
Status: DISCONTINUED | OUTPATIENT
Start: 2022-12-14 | End: 2022-12-15 | Stop reason: HOSPADM

## 2022-12-14 RX ORDER — ONDANSETRON 4 MG/1
4 TABLET, ORALLY DISINTEGRATING ORAL EVERY 8 HOURS PRN
Qty: 12 TABLET | Refills: 0 | Status: SHIPPED | OUTPATIENT
Start: 2022-12-14

## 2022-12-14 RX ORDER — ONDANSETRON 2 MG/ML
4 INJECTION INTRAMUSCULAR; INTRAVENOUS EVERY 30 MIN PRN
Status: DISCONTINUED | OUTPATIENT
Start: 2022-12-14 | End: 2022-12-15 | Stop reason: HOSPADM

## 2022-12-14 RX ORDER — SODIUM CHLORIDE, SODIUM LACTATE, POTASSIUM CHLORIDE, CALCIUM CHLORIDE 600; 310; 30; 20 MG/100ML; MG/100ML; MG/100ML; MG/100ML
INJECTION, SOLUTION INTRAVENOUS CONTINUOUS
Status: DISCONTINUED | OUTPATIENT
Start: 2022-12-14 | End: 2022-12-15 | Stop reason: HOSPADM

## 2022-12-14 RX ORDER — MEPERIDINE HYDROCHLORIDE 25 MG/ML
12.5 INJECTION INTRAMUSCULAR; INTRAVENOUS; SUBCUTANEOUS
Status: DISCONTINUED | OUTPATIENT
Start: 2022-12-14 | End: 2022-12-15 | Stop reason: HOSPADM

## 2022-12-14 RX ORDER — PROPOFOL 10 MG/ML
INJECTION, EMULSION INTRAVENOUS CONTINUOUS PRN
Status: DISCONTINUED | OUTPATIENT
Start: 2022-12-14 | End: 2022-12-14

## 2022-12-14 RX ORDER — OXYCODONE HYDROCHLORIDE 5 MG/1
5 TABLET ORAL EVERY 6 HOURS PRN
Qty: 12 TABLET | Refills: 0 | Status: SHIPPED | OUTPATIENT
Start: 2022-12-14 | End: 2022-12-17

## 2022-12-14 RX ORDER — ONDANSETRON 4 MG/1
4 TABLET, ORALLY DISINTEGRATING ORAL EVERY 30 MIN PRN
Status: DISCONTINUED | OUTPATIENT
Start: 2022-12-14 | End: 2022-12-15 | Stop reason: HOSPADM

## 2022-12-14 RX ORDER — PHENYLEPHRINE HYDROCHLORIDE 10 MG/ML
INJECTION INTRAVENOUS PRN
Status: DISCONTINUED | OUTPATIENT
Start: 2022-12-14 | End: 2022-12-14

## 2022-12-14 RX ORDER — PROPOFOL 10 MG/ML
INJECTION, EMULSION INTRAVENOUS PRN
Status: DISCONTINUED | OUTPATIENT
Start: 2022-12-14 | End: 2022-12-14

## 2022-12-14 RX ORDER — CLINDAMYCIN PHOSPHATE 900 MG/50ML
900 INJECTION, SOLUTION INTRAVENOUS
Status: COMPLETED | OUTPATIENT
Start: 2022-12-14 | End: 2022-12-14

## 2022-12-14 RX ORDER — DEXAMETHASONE SODIUM PHOSPHATE 4 MG/ML
INJECTION, SOLUTION INTRA-ARTICULAR; INTRALESIONAL; INTRAMUSCULAR; INTRAVENOUS; SOFT TISSUE PRN
Status: DISCONTINUED | OUTPATIENT
Start: 2022-12-14 | End: 2022-12-14

## 2022-12-14 RX ORDER — DOXYCYCLINE 100 MG/1
100 CAPSULE ORAL 2 TIMES DAILY
Qty: 6 CAPSULE | Refills: 0 | Status: SHIPPED | OUTPATIENT
Start: 2022-12-14 | End: 2023-03-15

## 2022-12-14 RX ORDER — FENTANYL CITRATE 50 UG/ML
25 INJECTION, SOLUTION INTRAMUSCULAR; INTRAVENOUS EVERY 5 MIN PRN
Status: DISCONTINUED | OUTPATIENT
Start: 2022-12-14 | End: 2022-12-15 | Stop reason: HOSPADM

## 2022-12-14 RX ADMIN — Medication 20 MG: at 09:31

## 2022-12-14 RX ADMIN — FENTANYL CITRATE 25 MCG: 50 INJECTION, SOLUTION INTRAMUSCULAR; INTRAVENOUS at 10:32

## 2022-12-14 RX ADMIN — Medication 20 MG: at 08:35

## 2022-12-14 RX ADMIN — FENTANYL CITRATE 50 MCG: 50 INJECTION, SOLUTION INTRAMUSCULAR; INTRAVENOUS at 07:43

## 2022-12-14 RX ADMIN — FENTANYL CITRATE 25 MCG: 50 INJECTION, SOLUTION INTRAMUSCULAR; INTRAVENOUS at 10:55

## 2022-12-14 RX ADMIN — Medication 50 MG: at 07:14

## 2022-12-14 RX ADMIN — PROPOFOL 150 MCG/KG/MIN: 10 INJECTION, EMULSION INTRAVENOUS at 07:16

## 2022-12-14 RX ADMIN — DEXAMETHASONE SODIUM PHOSPHATE 8 MG: 4 INJECTION, SOLUTION INTRA-ARTICULAR; INTRALESIONAL; INTRAMUSCULAR; INTRAVENOUS; SOFT TISSUE at 07:42

## 2022-12-14 RX ADMIN — PHENYLEPHRINE HYDROCHLORIDE 100 MCG: 10 INJECTION INTRAVENOUS at 07:27

## 2022-12-14 RX ADMIN — FENTANYL CITRATE 25 MCG: 50 INJECTION, SOLUTION INTRAMUSCULAR; INTRAVENOUS at 10:26

## 2022-12-14 RX ADMIN — LIDOCAINE HYDROCHLORIDE 60 MG: 20 INJECTION, SOLUTION INFILTRATION; PERINEURAL at 07:14

## 2022-12-14 RX ADMIN — Medication 20 MG: at 09:12

## 2022-12-14 RX ADMIN — PROPOFOL 250 MG: 10 INJECTION, EMULSION INTRAVENOUS at 07:14

## 2022-12-14 RX ADMIN — OXYCODONE HYDROCHLORIDE 5 MG: 5 TABLET ORAL at 10:36

## 2022-12-14 RX ADMIN — SODIUM CHLORIDE, SODIUM LACTATE, POTASSIUM CHLORIDE, CALCIUM CHLORIDE: 600; 310; 30; 20 INJECTION, SOLUTION INTRAVENOUS at 06:32

## 2022-12-14 RX ADMIN — ACETAMINOPHEN 975 MG: 325 TABLET ORAL at 06:25

## 2022-12-14 RX ADMIN — Medication 0.5 MG: at 09:01

## 2022-12-14 RX ADMIN — ONDANSETRON 4 MG: 2 INJECTION INTRAMUSCULAR; INTRAVENOUS at 09:23

## 2022-12-14 RX ADMIN — FENTANYL CITRATE 25 MCG: 50 INJECTION, SOLUTION INTRAMUSCULAR; INTRAVENOUS at 10:46

## 2022-12-14 RX ADMIN — Medication 20 MG: at 08:14

## 2022-12-14 RX ADMIN — PHENYLEPHRINE HYDROCHLORIDE 100 MCG: 10 INJECTION INTRAVENOUS at 07:35

## 2022-12-14 RX ADMIN — FENTANYL CITRATE 25 MCG: 50 INJECTION, SOLUTION INTRAMUSCULAR; INTRAVENOUS at 07:14

## 2022-12-14 RX ADMIN — Medication 0.5 MG: at 08:11

## 2022-12-14 RX ADMIN — FENTANYL CITRATE 25 MCG: 50 INJECTION, SOLUTION INTRAMUSCULAR; INTRAVENOUS at 07:27

## 2022-12-14 RX ADMIN — CLINDAMYCIN PHOSPHATE 900 MG: 900 INJECTION, SOLUTION INTRAVENOUS at 07:07

## 2022-12-14 RX ADMIN — Medication 20 MG: at 07:50

## 2022-12-14 RX ADMIN — SODIUM CHLORIDE, SODIUM LACTATE, POTASSIUM CHLORIDE, CALCIUM CHLORIDE: 600; 310; 30; 20 INJECTION, SOLUTION INTRAVENOUS at 08:34

## 2022-12-14 NOTE — OR NURSING
"Pt has red \"scratch\" marks on her nose, forehead and above her lip at this time.  Pt says it is not an allergic reaction, it happens when she scratches her skin according to pt.  Will cont to monitor.  "

## 2022-12-14 NOTE — ANESTHESIA POSTPROCEDURE EVALUATION
Patient: Demetri Mosley    Procedure: Procedure(s):  BILATERAL RECONSTRUCTED BREAST REVISION,  ABDOMINAL SCAR REVISION       Anesthesia Type:  General    Note:  Disposition: Outpatient   Postop Pain Control: Uneventful            Sign Out: Well controlled pain   PONV: No   Neuro/Psych: Uneventful            Sign Out: Acceptable/Baseline neuro status   Airway/Respiratory: Uneventful            Sign Out: Acceptable/Baseline resp. status   CV/Hemodynamics: Uneventful            Sign Out: Acceptable CV status; No obvious hypovolemia; No obvious fluid overload   Other NRE: NONE   DID A NON-ROUTINE EVENT OCCUR? No           Last vitals:  Vitals Value Taken Time   /83 12/14/22 1130   Temp 98  F (36.7  C) 12/14/22 1130   Pulse     Resp 14 12/14/22 1130   SpO2 97 % 12/14/22 1130       Electronically Signed By: Jun Marks MD  December 14, 2022  2:06 PM

## 2022-12-14 NOTE — ANESTHESIA PREPROCEDURE EVALUATION
Anesthesia Pre-Procedure Evaluation    Patient: Demetri Mosley   MRN: [id number] : 1980        Procedure : Procedure(s):  BILATERAL RECONSTRUCTED BREAST REVISION,  ABDOMINAL SCAR REVISION          Past Medical History:   Diagnosis Date     Allergic rhinitis due to animal dander      Anemia      Arthritis     rheumatoid     Chronic abdominal pain      Cyclic vomiting syndrome      Diabetes (H)     type 2     Diagnostic skin and sensitization tests (aka ALLERGENS) 2/19/15 IgE tests pos. for: cat/dog/DM/T/G/RW, NEG  for fish and shellfish.     DVT (deep venous thrombosis) (H) 2005    s/p long hospitalization - anticoagulated x 6 months     Endometriosis 2009    total hysterectomy     Gastro-oesophageal reflux disease      House dust mite allergy     2/19/15 IgE tests pos. for: cat/dog/DM/T/G/RW, NEG  for fish and shellfish.     Migraines      parotid mass 2012    benign, left, removed     PONV (postoperative nausea and vomiting)      Pyelonephritis due to Escherichia coli 05/15/2016     Uncomplicated asthma       Past Surgical History:   Procedure Laterality Date     CHOLECYSTECTOMY       COLONOSCOPY N/A 2016    Procedure: COLONOSCOPY;  Surgeon: Edvin Summers MD;  Location:  GI     ENT SURGERY      T&A     ESOPHAGOSCOPY, GASTROSCOPY, DUODENOSCOPY (EGD), COMBINED N/A 2016    Procedure: COMBINED ESOPHAGOSCOPY, GASTROSCOPY, DUODENOSCOPY (EGD), BIOPSY SINGLE OR MULTIPLE;  Surgeon: Edvin Summers MD;  Location: [facility]     ESOPHAGOSCOPY, GASTROSCOPY, DUODENOSCOPY (EGD), COMBINED N/A 2022    Procedure: ESOPHAGOGASTRODUODENOSCOPY, WITH BIOPSY;  Surgeon: Tejas Orourke DO;  Location[place] [facility] [place]     ESOPHAGOSCOPY, GASTROSCOPY, DUODENOSCOPY (EGD), DILATATION, COMBINED N/A 2017    Procedure: COMBINED ESOPHAGOSCOPY, GASTROSCOPY, DUODENOSCOPY (EGD), DILATATION;  Surgeon: Corry Franco MD;  Location:  OR     GI SURGERY      ERCP     GYN SURGERY      hysterectomy     HC  "CAPSULE ENDOSCOPY N/A 9/13/2016    Procedure: CAPSULE/PILL CAM ENDOSCOPY;  Surgeon: Iraj Choe MD;  Location:  GI     HC ESOPH/GAS REFLUX TEST W NASAL IMPED >1 HR N/A 2/17/2016    Procedure: ESOPHAGEAL IMPEDENCE FUNCTION TEST WITH 24 HOUR PH GREATER THAN 1 HOUR;  Surgeon: Charan Ozuna MD;  Location:  GI      UGI ENDOSCOPY W EUS  5/30/2013    Procedure: COMBINED ENDOSCOPIC ULTRASOUND, ESOPHAGOSCOPY, GASTROSCOPY, DUODENOSCOPY (EGD);  Surgeon: Juan Yu MD;  Location:  GI     HEAD & NECK SURGERY      tumor removed     HYSTERECTOMY, PAP NO LONGER INDICATED       INJECT BLOCK MEDIAL BRANCH CERVICAL/THORACIC/LUMBAR Bilateral 3/25/2015    Procedure: INJECT BLOCK MEDIAL BRANCH CERVICAL / THORACIC / LUMBAR;  Surgeon: Malvin Rivera MD;  Location: [facility] OR     INSERT PORT VASCULAR ACCESS N/A 9/12/2016    Procedure: INSERT PORT VASCULAR ACCESS;  Surgeon: Juani Casiano MD;  Location:  OR     LAPAROSCOPIC NISSEN FUNDOPLICATION N/A 12/23/2016    Procedure: LAPAROSCOPIC NISSEN FUNDOPLICATION;  Surgeon: Noah Brito MD;  Location:  OR     ORTHOPEDIC SURGERY       RESECT FIRST RIB Left 5/1/2015    Procedure: RESECT FIRST RIB;  Surgeon: Tien Yang MD;  Location:  OR      Allergies   Allergen Reactions     Propranolol Shortness Of Breath            Compazine Hives     Severe muscle cramping and bad anxiety     Fluoxetine Other (See Comments)     Seizures     Gabapentin Swelling     Swelling of hands and feet     Lyrica Swelling     Metoclopramide Other (See Comments)     Very irritable.  Very irritable.     Pregabalin Swelling     Prochlorperazine Other (See Comments)     Severe muscle cramping and bad anxiety     Reglan Other (See Comments)     Very irritable.     Vancomycin Other (See Comments)     Able to tolerate with pre-medication (diphenhydramine) and slow rate.  \"crissy Syndrome\"     Wheat Diarrhea     Wheat Bran GI Disturbance     Adhesive Tape Rash     Skin " irritation     Contrast Dye Rash     Ct contrast dye  Rash over entire trunk     Diatrizoate Rash     CT contrast  Ct contrast dye  Rash over entire trunk     Dye Fdc Red [Red Dye] Rash     FD&C RED #40     Latex Hives and Rash     Liquid Adhesive Rash     Other [No Clinical Screening - See Comments] Rash     Diatrizoate meglumine     Penicillins Rash      Social History     Tobacco Use     Smoking status: Former     Packs/day: 0.25     Years: 5.00     Pack years: 1.25     Types: Cigarettes     Quit date: 2010     Years since quittin.5     Smokeless tobacco: Never   Substance Use Topics     Alcohol use: Yes     Comment: social - not that often.  Maybe one glass/week.      Wt Readings from Last 1 Encounters:   22 71.7 kg (158 lb)        Anesthesia Evaluation   Pt has had prior anesthetic. Type: General.    History of anesthetic complications  - PONV.      ROS/MED HX  ENT/Pulmonary:     (+) asthma     Neurologic:     (+) migraines,     Cardiovascular:     (+) hypertension-----    METS/Exercise Tolerance:     Hematologic:  - neg hematologic  ROS     Musculoskeletal:   (+) arthritis,     GI/Hepatic: Comment: S/P Nissen Fundoplication.  Cyclic vomiting disorder    (+) GERD, Asymptomatic on medication,     Renal/Genitourinary:  - neg Renal ROS     Endo:  - neg endo ROS     Psychiatric/Substance Use:  - neg psychiatric ROS     Infectious Disease:  - neg infectious disease ROS     Malignancy:  - neg malignancy ROS     Other:  - neg other ROS          Physical Exam    Airway  airway exam normal           Respiratory Devices and Support         Dental  no notable dental history         Cardiovascular   cardiovascular exam normal          Pulmonary   pulmonary exam normal                OUTSIDE LABS:  CBC:   Lab Results   Component Value Date    WBC 7.3 2016    WBC 4.1 11/10/2016    HGB 14.3 2016    HGB 14.3 2016    HCT 43.4 2016    HCT 40.9 11/10/2016     2016      12/21/2016     BMP:   Lab Results   Component Value Date     12/21/2016     11/09/2016    POTASSIUM 3.7 12/21/2016    POTASSIUM 3.8 11/09/2016    CHLORIDE 104 12/21/2016    CHLORIDE 108 11/09/2016    CO2 25 12/21/2016    CO2 24 11/09/2016    BUN 13 12/21/2016    BUN 9 11/09/2016    CR 0.83 12/23/2016    CR 0.79 12/21/2016    GLC 85 12/14/2022    GLC 83 12/21/2016     COAGS:   Lab Results   Component Value Date    PTT 25 09/12/2016    INR 1.13 09/12/2016     POC:   Lab Results   Component Value Date    HCG Negative 05/31/2016     HEPATIC:   Lab Results   Component Value Date    ALBUMIN 4.0 05/22/2019    PROTTOTAL 6.9 11/09/2016    ALT 26 11/09/2016    AST 20 11/09/2016    ALKPHOS 60 11/09/2016    BILITOTAL 0.8 11/09/2016     OTHER:   Lab Results   Component Value Date    LACT 0.3 (L) 08/13/2016    A1C 5.2 05/01/2015    OLE 9.3 12/21/2016    PHOS 3.6 05/25/2016    MAG 1.8 05/16/2016    LIPASE 114 08/12/2016    TSH 0.60 05/15/2016    CRP 4.8 08/13/2016    SED 8 06/24/2016       Anesthesia Plan    ASA Status:  3   NPO Status:  NPO Appropriate    Anesthesia Type: General.     - Airway: ETT   Induction: Intravenous, Propofol.   Maintenance: Balanced.        Consents    Anesthesia Plan(s) and associated risks, benefits, and realistic alternatives discussed. Questions answered and patient/representative(s) expressed understanding.    - Discussed:     - Discussed with:  Patient      - Extended Intubation/Ventilatory Support Discussed: No.      - Patient is DNR/DNI Status: No    Use of blood products discussed: No .     Postoperative Care    Pain management: IV analgesics, Oral pain medications.   PONV prophylaxis: Ondansetron (or other 5HT-3), Background Propofol Infusion, Dexamethasone or Solumedrol     Comments:                Jun Marks MD

## 2022-12-14 NOTE — PROGRESS NOTES
PRS    No changes in history or exam. Last dose of Actemra 4 weeks ago. Plan is to resume infusions 3-4 weeks after this surgery to optimize healing. CT reviewed and small fat containing hernia. We will forego umbilical hernia repair.     /79   Temp 98.4  F (36.9  C) (Temporal)   Resp 16   LMP 11/21/2001   SpO2 95%   L breast with more ptosis than the R  Small BL chest and abdominal dog-ears    A/P: 42F s/p BL SUBHA flap recon p/f revision    -OR today for BILATERAL reconstructed breast revision, abdominal scar revision  -Discussed the risks of surgery, including but not limited to: infection, bleeding, pain, poor scarring, asymmetries, wound healing issues, need for revision surgery. Despite these risks, patient consents to surgery.   -Discharge from PACU    Quinn Bowden MD, PhD

## 2022-12-14 NOTE — ANESTHESIA CARE TRANSFER NOTE
Patient: Demetri Mosley    Procedure: Procedure(s):  BILATERAL RECONSTRUCTED BREAST REVISION,  ABDOMINAL SCAR REVISION       Diagnosis: Absence of both breasts [Z90.13]  Diagnosis Additional Information: No value filed.    Anesthesia Type:   General     Note:    Oropharynx: oropharynx clear of all foreign objects  Level of Consciousness: awake  Oxygen Supplementation: face mask  Level of Supplemental Oxygen (L/min / FiO2): 6  Independent Airway: airway patency satisfactory and stable  Dentition: dentition unchanged  Vital Signs Stable: post-procedure vital signs reviewed and stable  Report to RN Given: handoff report given  Patient transferred to: PACU  Comments: To PACU after suctioned and extubated awake.  Report to RN. VSS, Respiratory status stable.  Handoff Report: Identifed the Patient, Identified the Reponsible Provider, Reviewed the pertinent medical history, Discussed the surgical course, Reviewed Intra-OP anesthesia mangement and issues during anesthesia, Set expectations for post-procedure period and Allowed opportunity for questions and acknowledgement of understanding      Vitals:  Vitals Value Taken Time   /82 12/14/22 1015   Temp 99.1  F (37.3  C) 12/14/22 1015   Pulse     Resp 16 12/14/22 1015   SpO2 100 % 12/14/22 1015       Electronically Signed By: EDEL Preciado CRNA  December 14, 2022  10:19 AM

## 2022-12-14 NOTE — OR NURSING
Pts pain vascillates from 4-8.  Has received 100mg of fentanyl and po oxycodone at this time. Pt states pain medication is not working- needs to be told to take deep breaths frequently as rr decreases.  Attempting to wean O2 at this time but still requiring at this time.

## 2022-12-14 NOTE — OP NOTE
[facility] OPERATIVE REPORT     Date of Surgery: 12/14/2022  Surgical Service: Plastic Surgery     Preoperative Diagnosis:   1.  Acquired absence of bilateral breasts  2.  Status post bilateral abdominally-based free flap breast reconstruction     Postoperative Diagnosis: Same as preoperative diagnoses     Procedures Performed:  1.  Bilateral reconstructed breast revision  2.  Bilateral lower abdominal scar revision with dogear excision with complex closure (10-cm)     Attending:  ZACH Bowden MD, PhD  Assistant: THERESE Braynt PA-C (no resident assistance available)     Complications: None apparent  Specimens: Left and right breast skin for permanent pathology  Implants: None  Estimated blood loss: 25 mL  Wound classification: Clean  Anesthesia: General     Indications for Procedure: 42-year-old female with history of breast cancer status post mastectomies and abdominally-based free flap breast reconstruction, presenting for revision surgery.  She needs a lift procedure on both reconstructed breasts as well as lower abdominal lateral dogear excisions.  Discussed the risks of surgery, including but not limited to: infection, bleeding, pain, poor scarring, asymmetries, wound healing issues, need for revision surgery. Despite these risks, patient consents to surgery.      Intraoperative findings: Remove the abdominal skin paddle on both reconstructed breasts lifted and narrowed breast with skin excision.  Bilateral lower abdominal dogear excisions.     Description of Procedure: Patient was seen in the preoperative holding area.  Consent was verified.  The bilateral breasts and lower abdominal lateral dogears were marked.  All additional questions were answered. Discussed the risks of surgery, including but not limited to: infection, bleeding, pain, poor scarring, asymmetries, wound healing issues, need for revision surgery. Despite these risks, patient consents to surgery.   Following discussion of all these risks, the  patient again agreed to proceed with surgery.  Patient was then transferred to the operating room and placed supine on the operating table.  All pressure points were padded.  Sequential compression devices were placed on bilateral lower extremities and verified to be operational.  IV antibiotic prophylaxis was given.  Preinduction timeout was performed.    General anesthesia care was commenced.    Bilateral breasts and trunk were prepped and draped in usual sterile fashion.  Timeout was done.  The same procedure was done on both reconstructed breasts, starting with the left.  The preoperatively marked inverted T incisions were made using a #10 blade.  The skin was resected.  Of note, the abdominal flap skin paddle was also removed.  The mastectomy skin that was resected was sent as a permanent specimen for pathology.  Next, hemostasis obtained using monopolar electrocautery.  Provisional skin tailor tacking was next done.  The same procedure was done on the right reconstructed breast.  Once done, additional skin markings were made for additional skin excision.  That additional skin was excised after removing skin staples.  The breast was then shaped with several interrupted figure-of-eight 0 PDS suture.  Next, the skin was provisionally stapled again and closed in layers using 2-0 Vicryl suture in the deep dermis, 3-0 Monocryl suture in the deep dermis, and 3-0 Monocryl intracuticular running suture for the skin.  The incisions were dressed with Steri-Strips and skin adhesive.  Next, bilateral lower abdominal lateral dogears were excised with a #10 blade.  Hemostasis was obtained using monopolar electrocautery.  The dogear excisions were closed in a complex fashion in layers using 2-0 Vicryl in the superficial fascia, 2-0 Vicryl and 3-0 Monocryl for the deep dermis and 3-0 Monocryl for running intracuticular closure for the skin.  The dogear closures were dressed with Steri-Strips and skin adhesive.  Patient then  had fluff gauze and a gentle ACE bandage wrap placed on the chest.  Patient was woken up with no events and transferred to the postanesthesia care unit with no issues.     Postoperative plan:  Shower and 36-48 hours.  Clinic in 10-14 days.     Quinn Bowden MD, PhD

## 2022-12-14 NOTE — BRIEF OP NOTE
[facility]    Brief Operative Note    Pre-operative diagnosis: Absence of both breasts [Z90.13]  Post-operative diagnosis Same as pre-operative diagnosis    Procedure: Procedure(s):  BILATERAL RECONSTRUCTED BREAST REVISION,  ABDOMINAL SCAR REVISION  Surgeon: Surgeon(s) and Role:     * Quinn Bowden MD - Primary     * Yasmine Bryant PA-C - Assisting  Anesthesia: General   Estimated Blood Loss: 25 mL from 12/14/2022  7:08 AM to 12/14/2022 10:15 AM      Drains: None  Specimens:   ID Type Source Tests Collected by Time Destination   1 : RIGHT BREAST SKIN Tissue Breast, Right SURGICAL PATHOLOGY EXAM Quinn Bowden MD 12/14/2022  7:54 AM    2 : LEFT BREAST SKIN Tissue Breast, Left SURGICAL PATHOLOGY EXAM Quinn Bowden MD 12/14/2022  7:58 AM      Findings:   None.  Complications: None.  Implants: * No implants in log *    Yasmine Bryant PA-C on 12/14/2022 at 10:21 AM

## 2022-12-19 ENCOUNTER — MYC MEDICAL ADVICE (OUTPATIENT)
Dept: SURGERY | Facility: CLINIC | Age: 42
End: 2022-12-19

## 2022-12-20 ENCOUNTER — NURSE TRIAGE (OUTPATIENT)
Dept: NURSING | Facility: CLINIC | Age: 42
End: 2022-12-20

## 2022-12-20 NOTE — TELEPHONE ENCOUNTER
"information only:  Pt calls transferred to red flag triage.  Hx 12/14/22 BILATERAL RECONSTRUCTED BREAST REVISION w  Quinn Bowden MD. Friday , 5 days  ago removed bandages.  Ace wrap, Steri strip pulled skin, had  \" hole\" left side. No s infection. serosanguinous drainage. No fever, left side  area red. Right side looks good. Small gap along incision line. Pain 4-5/10 . Cant wear sports bra, is wearing cotton tank top. Is ace wrapping from umbilicus to clavicle.  Sent photos yesterday. Will try to send now also. NV 12/30/22  Can be reached at . PLease call pt.     [facility] tele triage protocol Post op problems pg [phone].  "

## 2022-12-21 NOTE — TELEPHONE ENCOUNTER
RN spoke with pt yesterday regarding issue mentioned in mychart . See separate encounter..Mirian Uribe RN

## 2022-12-30 ENCOUNTER — OFFICE VISIT (OUTPATIENT)
Dept: SURGERY | Facility: CLINIC | Age: 42
End: 2022-12-30
Payer: COMMERCIAL

## 2022-12-30 DIAGNOSIS — Z90.13 ABSENCE OF BOTH BREASTS: Primary | ICD-10-CM

## 2022-12-30 PROCEDURE — 99024 POSTOP FOLLOW-UP VISIT: CPT | Performed by: STUDENT IN AN ORGANIZED HEALTH CARE EDUCATION/TRAINING PROGRAM

## 2022-12-30 NOTE — LETTER
12/30/2022         RE: Demetri Mosley  [address] [place] [address]        Dear Colleague,    Thank you for referring your patient, Demetri Mosley, to the [facility]. Please see a copy of my visit note below.    PRS    Doing well.  No issues.  She is very happy with the result.    On exam, bilateral breast incisions are well-healing with no signs of infection or wounds.  Bilateral lower abdominal incisions are well-healing with no signs of infection.  There is a slight residual standing cone deformity on the right lateral lower abdomen.    A/P: 42-year-old female 2 weeks status post bilateral reconstructed breast revision, and bilateral lower abdominal scar revision    -Discussed activity restrictions including limiting lifting to 10 pounds  -Once the skin adhesive completely falls off, patient can initiate scar treatment with silicone sheeting or tape  -No soaking or swimming until 6 weeks postoperatively  -Photography today  -Patient would like nipple areolar reconstruction, so we will place case request to be scheduled no earlier than greater than 3 months after last operation    Quinn Bowden MD, PhD        Again, thank you for allowing me to participate in the care of your patient.        Sincerely,        Quinn Bowden MD

## 2022-12-30 NOTE — NURSING NOTE
"Demetri Mosley's chief complaint for this visit includes:  Chief Complaint   Patient presents with     RECHECK     Post-op - DOS 12/14 BILATERAL RECONSTRUCTED BREAST REVISION,  ABDOMINAL SCAR REVISION     PCP: Nikolay Baeza    Referring Provider:  Quinn Bowden MD  [facility]  [address] [place] [address]    LMP 11/21/2001   Data Unavailable        Allergies   Allergen Reactions     Propranolol Shortness Of Breath            Compazine Hives     Severe muscle cramping and bad anxiety     Fluoxetine Other (See Comments)     Seizures     Gabapentin Swelling     Swelling of hands and feet     Lyrica Swelling     Metoclopramide Other (See Comments)     Very irritable.  Very irritable.     Pregabalin Swelling     Prochlorperazine Other (See Comments)     Severe muscle cramping and bad anxiety     Reglan Other (See Comments)     Very irritable.     Vancomycin Other (See Comments)     Able to tolerate with pre-medication (diphenhydramine) and slow rate.  \"crissy Syndrome\"     Wheat Diarrhea     Wheat Bran GI Disturbance     Adhesive Tape Rash     Skin irritation     Contrast Dye Rash     Ct contrast dye  Rash over entire trunk     Diatrizoate Rash     CT contrast  Ct contrast dye  Rash over entire trunk     Dye Fdc Red [Red Dye] Rash     FD&C RED #40     Latex Hives and Rash     Liquid Adhesive Rash     Other [No Clinical Screening - See Comments] Rash     Diatrizoate meglumine     Penicillins Rash         Do you need any medication refills at today's visit? No    [facility] Visit Facilitator- Surgical Specialties       "

## 2022-12-30 NOTE — PROGRESS NOTES
PRS    Doing well.  No issues.  She is very happy with the result.    On exam, bilateral breast incisions are well-healing with no signs of infection or wounds.  Bilateral lower abdominal incisions are well-healing with no signs of infection.  There is a slight residual standing cone deformity on the right lateral lower abdomen.    A/P: 42-year-old female 2 weeks status post bilateral reconstructed breast revision, and bilateral lower abdominal scar revision    -Discussed activity restrictions including limiting lifting to 10 pounds  -Once the skin adhesive completely falls off, patient can initiate scar treatment with silicone sheeting or tape  -No soaking or swimming until 6 weeks postoperatively  -Photography today  -Patient would like nipple areolar reconstruction, so we will place case request to be scheduled no earlier than greater than 3 months after last operation    Quinn Bowden MD, PhD

## 2023-01-02 ENCOUNTER — TELEPHONE (OUTPATIENT)
Dept: SURGERY | Facility: CLINIC | Age: 43
End: 2023-01-02

## 2023-01-02 PROBLEM — Z90.13 ABSENCE OF BOTH BREASTS: Status: ACTIVE | Noted: 2022-09-27

## 2023-01-02 NOTE — TELEPHONE ENCOUNTER
Scheduled surgery for 4/19 in [place] with Dr. Bowden.    H&P with PCP.    Post-op scheduled for 5/5.    Writer will mail surgery packet.    No further questions/concerns at this time.

## 2023-01-04 ENCOUNTER — TELEPHONE (OUTPATIENT)
Dept: GASTROENTEROLOGY | Facility: CLINIC | Age: 43
End: 2023-01-04

## 2023-01-04 NOTE — TELEPHONE ENCOUNTER
Attempted to contact patient regarding upcoming upper endoscopy with Bravo capsule placement procedure on 1.19.23 for pre assessment questions. No answer.     Left message to return call to [phone] #4    [employer] message sent.    Susan Escobar RN  Endoscopy Procedure Pre Assessment RN

## 2023-01-10 ENCOUNTER — PATIENT OUTREACH (OUTPATIENT)
Dept: GASTROENTEROLOGY | Facility: CLINIC | Age: 43
End: 2023-01-10

## 2023-01-10 NOTE — TELEPHONE ENCOUNTER
Pre assessment questions completed for upcoming upper endoscopy with Bravo capsule placement procedure scheduled on 1.19.23    COVID policy reviewed.     Reviewed procedural arrival time 0715 and facility location [facility]; [address] [place] [address], [address] Floor, [place] [address]    Designated  policy reviewed. Instructed to have someone stay 24 hours post procedure.     Anticoagulation/blood thinners? No    Electronic implanted devices? No    Diabetic? Yes - Patient to hold oral diabetic medications day of procedure    Reviewed EGD/BRAVO procedure prep instructions.     Patient verbalized understanding and had no questions or concerns at this time.    Susan Escobar, IVA  Endoscopy Procedure Pre Assessment RN

## 2023-01-10 NOTE — TELEPHONE ENCOUNTER
Connected with pt to go over information and instructions for upcoming BRAVO study.  Pt has stopped acid reducing medications, will review their [employer] message and watch youtube video.  Pt verbalizes full understanding of instructions.  Main symptom: Globus  Monitor return visit scheduled for 1/27 at 2pm.

## 2023-01-19 ENCOUNTER — ANESTHESIA (OUTPATIENT)
Dept: SURGERY | Facility: AMBULATORY SURGERY CENTER | Age: 43
End: 2023-01-19
Payer: COMMERCIAL

## 2023-01-19 ENCOUNTER — HOSPITAL ENCOUNTER (OUTPATIENT)
Facility: AMBULATORY SURGERY CENTER | Age: 43
Discharge: HOME | End: 2023-01-19
Attending: INTERNAL MEDICINE
Payer: COMMERCIAL

## 2023-01-19 ENCOUNTER — ANESTHESIA EVENT (OUTPATIENT)
Dept: SURGERY | Facility: AMBULATORY SURGERY CENTER | Age: 43
End: 2023-01-19
Payer: COMMERCIAL

## 2023-01-19 VITALS
BODY MASS INDEX: 26.62 KG/M2 | HEIGHT: 61 IN | OXYGEN SATURATION: 99 % | TEMPERATURE: 97.9 F | HEART RATE: 90 BPM | WEIGHT: 141 LBS | RESPIRATION RATE: 16 BRPM | DIASTOLIC BLOOD PRESSURE: 72 MMHG | SYSTOLIC BLOOD PRESSURE: 105 MMHG

## 2023-01-19 VITALS — HEART RATE: 126 BPM

## 2023-01-19 LAB — UPPER GI ENDOSCOPY: NORMAL

## 2023-01-19 PROCEDURE — 91035 G-ESOPH REFLX TST W/ELECTROD: CPT | Mod: TC

## 2023-01-19 PROCEDURE — 43249 ESOPH EGD DILATION <30 MM: CPT

## 2023-01-19 RX ORDER — PROPOFOL 10 MG/ML
INJECTION, EMULSION INTRAVENOUS CONTINUOUS PRN
Status: DISCONTINUED | OUTPATIENT
Start: 2023-01-19 | End: 2023-01-19

## 2023-01-19 RX ORDER — ONDANSETRON 2 MG/ML
4 INJECTION INTRAMUSCULAR; INTRAVENOUS
Status: DISCONTINUED | OUTPATIENT
Start: 2023-01-19 | End: 2023-01-19 | Stop reason: HOSPADM

## 2023-01-19 RX ORDER — NALOXONE HYDROCHLORIDE 0.4 MG/ML
0.2 INJECTION, SOLUTION INTRAMUSCULAR; INTRAVENOUS; SUBCUTANEOUS
Status: DISCONTINUED | OUTPATIENT
Start: 2023-01-19 | End: 2023-01-20 | Stop reason: HOSPADM

## 2023-01-19 RX ORDER — SODIUM CHLORIDE, SODIUM LACTATE, POTASSIUM CHLORIDE, CALCIUM CHLORIDE 600; 310; 30; 20 MG/100ML; MG/100ML; MG/100ML; MG/100ML
INJECTION, SOLUTION INTRAVENOUS CONTINUOUS PRN
Status: DISCONTINUED | OUTPATIENT
Start: 2023-01-19 | End: 2023-01-19

## 2023-01-19 RX ORDER — LIDOCAINE 40 MG/G
CREAM TOPICAL
Status: DISCONTINUED | OUTPATIENT
Start: 2023-01-19 | End: 2023-01-19 | Stop reason: HOSPADM

## 2023-01-19 RX ORDER — ONDANSETRON 2 MG/ML
4 INJECTION INTRAMUSCULAR; INTRAVENOUS EVERY 6 HOURS PRN
Status: DISCONTINUED | OUTPATIENT
Start: 2023-01-19 | End: 2023-01-20 | Stop reason: HOSPADM

## 2023-01-19 RX ORDER — FLUMAZENIL 0.1 MG/ML
0.2 INJECTION, SOLUTION INTRAVENOUS
Status: ACTIVE | OUTPATIENT
Start: 2023-01-19 | End: 2023-01-19

## 2023-01-19 RX ORDER — LIDOCAINE HYDROCHLORIDE 20 MG/ML
INJECTION, SOLUTION INFILTRATION; PERINEURAL PRN
Status: DISCONTINUED | OUTPATIENT
Start: 2023-01-19 | End: 2023-01-19

## 2023-01-19 RX ORDER — HEPARIN SODIUM,PORCINE 10 UNIT/ML
5-10 VIAL (ML) INTRAVENOUS EVERY 24 HOURS
Status: DISCONTINUED | OUTPATIENT
Start: 2023-01-19 | End: 2023-01-20 | Stop reason: HOSPADM

## 2023-01-19 RX ORDER — NALOXONE HYDROCHLORIDE 0.4 MG/ML
0.4 INJECTION, SOLUTION INTRAMUSCULAR; INTRAVENOUS; SUBCUTANEOUS
Status: DISCONTINUED | OUTPATIENT
Start: 2023-01-19 | End: 2023-01-20 | Stop reason: HOSPADM

## 2023-01-19 RX ORDER — ONDANSETRON 4 MG/1
4 TABLET, ORALLY DISINTEGRATING ORAL EVERY 6 HOURS PRN
Status: DISCONTINUED | OUTPATIENT
Start: 2023-01-19 | End: 2023-01-20 | Stop reason: HOSPADM

## 2023-01-19 RX ORDER — PROCHLORPERAZINE MALEATE 10 MG
10 TABLET ORAL EVERY 6 HOURS PRN
Status: DISCONTINUED | OUTPATIENT
Start: 2023-01-19 | End: 2023-01-20 | Stop reason: HOSPADM

## 2023-01-19 RX ORDER — GLYCOPYRROLATE 0.2 MG/ML
INJECTION, SOLUTION INTRAMUSCULAR; INTRAVENOUS PRN
Status: DISCONTINUED | OUTPATIENT
Start: 2023-01-19 | End: 2023-01-19

## 2023-01-19 RX ORDER — HEPARIN SODIUM (PORCINE) LOCK FLUSH IV SOLN 100 UNIT/ML 100 UNIT/ML
5-10 SOLUTION INTRAVENOUS
Status: DISCONTINUED | OUTPATIENT
Start: 2023-01-19 | End: 2023-01-20 | Stop reason: HOSPADM

## 2023-01-19 RX ORDER — PROPOFOL 10 MG/ML
INJECTION, EMULSION INTRAVENOUS PRN
Status: DISCONTINUED | OUTPATIENT
Start: 2023-01-19 | End: 2023-01-19

## 2023-01-19 RX ORDER — HEPARIN SODIUM,PORCINE 10 UNIT/ML
5-10 VIAL (ML) INTRAVENOUS
Status: DISCONTINUED | OUTPATIENT
Start: 2023-01-19 | End: 2023-01-20 | Stop reason: HOSPADM

## 2023-01-19 RX ADMIN — PROPOFOL 100 MG: 10 INJECTION, EMULSION INTRAVENOUS at 08:28

## 2023-01-19 RX ADMIN — PROPOFOL 200 MCG/KG/MIN: 10 INJECTION, EMULSION INTRAVENOUS at 08:14

## 2023-01-19 RX ADMIN — PROPOFOL 100 MG: 10 INJECTION, EMULSION INTRAVENOUS at 08:14

## 2023-01-19 RX ADMIN — HEPARIN SODIUM (PORCINE) LOCK FLUSH IV SOLN 100 UNIT/ML 5 ML: 100 SOLUTION at 08:58

## 2023-01-19 RX ADMIN — GLYCOPYRROLATE 0.2 MG: 0.2 INJECTION, SOLUTION INTRAMUSCULAR; INTRAVENOUS at 08:11

## 2023-01-19 RX ADMIN — LIDOCAINE HYDROCHLORIDE 100 MG: 20 INJECTION, SOLUTION INFILTRATION; PERINEURAL at 08:14

## 2023-01-19 RX ADMIN — SODIUM CHLORIDE, SODIUM LACTATE, POTASSIUM CHLORIDE, CALCIUM CHLORIDE: 600; 310; 30; 20 INJECTION, SOLUTION INTRAVENOUS at 08:00

## 2023-01-19 NOTE — ANESTHESIA POSTPROCEDURE EVALUATION
Patient: Demetri Mosley    Procedure: Procedure(s):  ESOPHAGOGASTRODUODENOSCOPY, WITH BRAVO PH MONITORING DEVICE INSERTION AND BALLOON DILATION       Anesthesia Type:  MAC    Note:  Disposition: Outpatient   Postop Pain Control: Uneventful            Sign Out: Well controlled pain   PONV: No   Neuro/Psych: Uneventful            Sign Out: Acceptable/Baseline neuro status   Airway/Respiratory: Uneventful            Sign Out: Acceptable/Baseline resp. status   CV/Hemodynamics: Uneventful            Sign Out: Acceptable CV status; No obvious hypovolemia; No obvious fluid overload   Other NRE: NONE   DID A NON-ROUTINE EVENT OCCUR? No           Last vitals:  Vitals Value Taken Time   /72 01/19/23 0854   Temp 36.6  C (97.9  F) 01/19/23 0854   Pulse     Resp 16 01/19/23 0854   SpO2 99 % 01/19/23 0854       Electronically Signed By: Cole Negron MD  January 19, 2023  9:38 AM

## 2023-01-19 NOTE — ANESTHESIA CARE TRANSFER NOTE
Patient: Demetri Mosley    Procedure: Procedure(s):  ESOPHAGOGASTRODUODENOSCOPY, WITH BRAVO PH MONITORING DEVICE INSERTION AND BALLOON DILATION       Diagnosis: Globus sensation [R09.89]  Esophageal dysphagia [R13.19]  Diagnosis Additional Information: No value filed.    Anesthesia Type:   No value filed.     Note:    Oropharynx: oropharynx clear of all foreign objects  Level of Consciousness: awake  Oxygen Supplementation: room air    Independent Airway: airway patency satisfactory and stable  Dentition: dentition unchanged  Vital Signs Stable: post-procedure vital signs reviewed and stable  Report to RN Given: handoff report given  Patient transferred to: Phase II  Comments: VSS and WNL, comfortable, no PONV, report to Belia ENRIQUE  Handoff Report: Identifed the Patient, Identified the Reponsible Provider, Reviewed the pertinent medical history, Discussed the surgical course, Reviewed Intra-OP anesthesia mangement and issues during anesthesia, Set expectations for post-procedure period and Allowed opportunity for questions and acknowledgement of understanding      Vitals:  Vitals Value Taken Time   BP     Temp     Pulse     Resp     SpO2         Electronically Signed By: EDEL Miranda CRNA  January 19, 2023  8:41 AM

## 2023-01-19 NOTE — DISCHARGE INSTRUCTIONS
Instructions for Your Upper Endoscopy with Bravo Capsule Placement    After the procedure  You may burp up air left in your stomach.  You may feel drowsy or a little dizzy from the medicine.   Your throat may feel numb. One hour after the exam, swallow a small amount of cool water. If you can swallow easily, you may go back to your regular diet and medicines.  Your throat may be sore for the rest of the day. Throat lozenges or ice chips may help.  Do not drive for 24 hours.    Bravo Capsule Instructions  The capsule placed in your food pipe will record acid levels over 96 hours. You may do your usual activities during this time. To help get clear results, follow these directions:  Do not take aspirin or anti-inflammatory pain medicines (ibuprofen, naproxin, Motrin, Advil, Nuprin, Aleve)  Do not take acid reducers (Aciphex, Axid, cimetidine, esomeprazole, famotidine, Losec, metoclopramide, Nexium, nizatidine, omeprazole, pantoprazole, Pepcid, Prilosec, Propulsid, Protonix, rabeprazole, ranitidine, Reglan, Tagamet and Zantac).  Eat no more than 3 to 4 meals a day (no gum, hard candy or snacking in between meals). Stay upright for at least 2 hours after dinner, if you can.  Refer to the time shown on your recorder, not your clock or watch.  If the  beeps, you may be more than 3 feet away from the  or near other electronics. Hold it to your chest for 30 seconds to reset it. You will hear a beep and the light will flash for 3 seconds to let you know the event was recorded.  If the recorder is on standby (backlight is off), press any button to turn it back on. (Nothing will be recorded.) Next, press the desired button to record an event.    How to record  Meals: Press the Meal button on your recorder at the start of a meal; it will stay lit. Press it again at the end of the meal to turn it off. Write the start and end times in your diary.  Beverages (other than water): Press the Meal button. Finish  drinking within 25 minutes. Write the start and end times in your diary.  Lying down and getting up: Press the Supine button when you lie down; it will stay lit. Press it again when you get up (even if just getting up to use the bathroom). The light will turn off. Write the start and end times in your diary.  Symptoms: Press the Symptom button, and write the time in your diary. If symptoms lasts longer than 15 minutes, press the button again.  Symptoms may include:  Belly pain, chest pain or heartburn  Coughing or wheezing  Burping or bringing swallowed food back up into your mouth,  Feeling sick to your stomach or throwing up  Trouble swallowing.  Medicines: Log any medicine times in your diary. (No need to press a button.) Take Acetaminophen (Tylenol) for discomfort. Do not take aspirin, anti-inflammatories (ibuprofen) or antacids. Ask your pharmacist if you have questions.    Caring for the recorder  Please keep the unit in its case at all times. Handle it with care.  Do not drop it  Do not get it wet. You may take a shower or tub bath, but leave the recorder on a counter nearby within 3 feet.  Never turn off the monitor or disconnect it. It will turn off by itself after 96 hours.  If you take the recorder off while sleeping, place it on a stable surface near your chest and within an arm's reach.    After the test is complete  The capsule will pass naturally out of your body within 7 to 10 days with a bowel movement.  The capsule contains a small magnet. Do not have an MRI test for 30 days. An MRI could harm you if the capsule has not passed out of your body.  Bring the recorder and diary back to [facility] at [address] [place] [id number]. 3rd Floor Non-invasive GI Procedures.  Do not send in the mail.    Call us at once if you have:  Unusual pain or problems swallowing, unusual stomach or chest pain.  Vomit that looks like coffee grounds or black or bloody stools (bowel movements).  A  fever above 100.6  F (37.5  C) when taken under the tongue.    Test results  You will receive your results in 7 to 10 business days by phone, letter or MyChart.    For questions or appointments, call:  [facility]: [phone], option 2  Monday through Friday, 8 a.m. to 4:30 p.m.  (If it is after hours, call [phone]. Ask for the GI fellow resident on call.)

## 2023-01-19 NOTE — H&P
Demetri WILBUR Mosley  [id number]  female  42 year old      Reason for procedure/surgery: egd, globus, empiric dilation    Patient Active Problem List   Diagnosis     H/O personality disorder     Attention deficit disorder     B-complex deficiency     Chronic pain syndrome     Persistent vomiting     Rheumatoid arthritis (H)     Spasm of sphincter of Oddi     Enthesopathy of hip region     Cyclic vomiting syndrome     Essential hypertension, benign     Urticaria     Intermittent asthma     Gastroesophageal reflux disease without esophagitis     Anxiety     Neck pain     CARDIOVASCULAR SCREENING; LDL GOAL LESS THAN 130     Shift work sleep disorder     Vitamin D deficiency     Portacath in place     House dust mite allergy     Allergic rhinitis due to animal dander     Seasonal allergic rhinitis     Diagnostic skin and sensitization tests (aka ALLERGENS)     Mastocytosis     TOS (thoracic outlet syndrome)     Primary hypercoagulable state [289.81]     Vomiting     Brachial neuritis or radiculitis     Pain of left hand     Mechanical problems with limbs     Hand weakness     Finger stiffness, left     Anemia, iron deficiency     Pyelonephritis due to Escherichia coli     Bacteremia     Encounter for blood transfusion     Fever, intermittent     History of recurrent urinary tract infection     Chronic urticaria     Chronic gastroesophageal reflux disease     GERD (gastroesophageal reflux disease)     Esophageal dysphagia     Globus sensation     History of Nissen fundoplication     Absence of both breasts       Past Surgical History:    Past Surgical History:   Procedure Laterality Date     CHOLECYSTECTOMY       COLONOSCOPY N/A 6/2/2016    Procedure: COLONOSCOPY;  Surgeon: Edvin Summers MD;  Location:  GI     ENT SURGERY      T&A     ESOPHAGOSCOPY, GASTROSCOPY, DUODENOSCOPY (EGD), COMBINED N/A 6/2/2016    Procedure: COMBINED ESOPHAGOSCOPY, GASTROSCOPY, DUODENOSCOPY (EGD), BIOPSY SINGLE OR MULTIPLE;  Surgeon: Igor  Edvin Vela MD;  Location:  GI     ESOPHAGOSCOPY, GASTROSCOPY, DUODENOSCOPY (EGD), COMBINED N/A 8/8/2022    Procedure: ESOPHAGOGASTRODUODENOSCOPY, WITH BIOPSY;  Surgeon: Tjeas Orourke DO;  Location: [facility] OR     ESOPHAGOSCOPY, GASTROSCOPY, DUODENOSCOPY (EGD), DILATATION, COMBINED N/A 2/1/2017    Procedure: COMBINED ESOPHAGOSCOPY, GASTROSCOPY, DUODENOSCOPY (EGD), DILATATION;  Surgeon: Corry Franco MD;  Location:  OR     GI SURGERY      ERCP     GYN SURGERY      hysterectomy     HC CAPSULE ENDOSCOPY N/A 9/13/2016    Procedure: CAPSULE/PILL CAM ENDOSCOPY;  Surgeon: Iraj Choe MD;  Location:  GI     HC ESOPH/GAS REFLUX TEST W NASAL IMPED >1 HR N/A 2/17/2016    Procedure: ESOPHAGEAL IMPEDENCE FUNCTION TEST WITH 24 HOUR PH GREATER THAN 1 HOUR;  Surgeon: Charan Ozuna MD;  Location:  GI     HC UGI ENDOSCOPY W EUS  5/30/2013    Procedure: COMBINED ENDOSCOPIC ULTRASOUND, ESOPHAGOSCOPY, GASTROSCOPY, DUODENOSCOPY (EGD);  Surgeon: Juan Yu MD;  Location:  GI     HEAD & NECK SURGERY      tumor removed     HYSTERECTOMY, PAP NO LONGER INDICATED       INJECT BLOCK MEDIAL BRANCH CERVICAL/THORACIC/LUMBAR Bilateral 3/25/2015    Procedure: INJECT BLOCK MEDIAL BRANCH CERVICAL / THORACIC / LUMBAR;  Surgeon: Malvin Rivera MD;  Location: [facility]     INSERT PORT VASCULAR ACCESS N/A 9/12/2016    Procedure: INSERT PORT VASCULAR ACCESS;  Surgeon: Juani Casiano MD;  [facility]     LAPAROSCOPIC NISSEN FUNDOPLICATION N/A 12/23/2016    Procedure: LAPAROSCOPIC NISSEN FUNDOPLICATION;  Surgeon: Noah Brito MD;  Location:  OR     ORTHOPEDIC SURGERY       RESECT FIRST RIB Left 5/1/2015    Procedure: RESECT FIRST RIB;  Surgeon: Tien Yang MD;  Location: [facility]     REVISE RECONSTRUCTED BREAST Bilateral 12/14/2022    Procedure: BILATERAL RECONSTRUCTED BREAST REVISION,;  Surgeon: Quinn Bowden MD;  Location: [facility]     REVISE SCAR TRUNK N/A 12/14/2022    Procedure:  ABDOMINAL SCAR REVISION;  Surgeon: Quinn Bowden MD;  Location: [facility]       Past Medical History:   Past Medical History:   Diagnosis Date     Allergic rhinitis due to animal dander      Anemia      Arthritis     rheumatoid     Chronic abdominal pain      Cyclic vomiting syndrome      Diabetes (H)     type 2     Diagnostic skin and sensitization tests (aka ALLERGENS) 2/19/15 IgE tests pos. for: cat/dog/DM/T/G/RW, NEG  for fish and shellfish.     DVT (deep venous thrombosis) (H) 2005    s/p long hospitalization - anticoagulated x 6 months     Endometriosis 2009    total hysterectomy     Gastro-oesophageal reflux disease      House dust mite allergy     2/19/15 IgE tests pos. for: cat/dog/DM/T/G/RW, NEG  for fish and shellfish.     Migraines      parotid mass 2012    benign, left, removed     PONV (postoperative nausea and vomiting)      Pyelonephritis due to Escherichia coli 05/15/2016     Uncomplicated asthma        Social History:   Social History     Tobacco Use     Smoking status: Former     Packs/day: 0.25     Years: 5.00     Pack years: 1.25     Types: Cigarettes     Quit date: 2010     Years since quittin.6     Smokeless tobacco: Never   Substance Use Topics     Alcohol use: Yes     Comment: social - not that often.  Maybe one glass/week.       Family History:   Family History   Problem Relation Age of Onset     Breast Cancer Mother      Cardiovascular Mother      Depression Mother      Lipids Mother      C.A.D. Father      Neurologic Disorder Father      Cardiovascular Father      Other - See Comments Son         Autism        Allergies:   Allergies   Allergen Reactions     Propranolol Shortness Of Breath            Compazine Hives     Severe muscle cramping and bad anxiety     Fluoxetine Other (See Comments)     Seizures     Gabapentin Swelling     Swelling of hands and feet     Lyrica Swelling     Metoclopramide Other (See Comments)     Very irritable.  Very irritable.      "Pregabalin Swelling     Prochlorperazine Other (See Comments)     Severe muscle cramping and bad anxiety     Reglan Other (See Comments)     Very irritable.     Vancomycin Other (See Comments)     Able to tolerate with pre-medication (diphenhydramine) and slow rate.  \"crissy Syndrome\"     Wheat Diarrhea     Wheat Bran GI Disturbance     Adhesive Tape Rash     Skin irritation     Contrast Dye Rash     Ct contrast dye  Rash over entire trunk     Diatrizoate Rash     CT contrast  Ct contrast dye  Rash over entire trunk     Dye Fdc Red [Red Dye] Rash     FD&C RED #40     Latex Hives and Rash     Liquid Adhesive Rash     Other [No Clinical Screening - See Comments] Rash     Diatrizoate meglumine     Penicillins Rash       Active Medications:   Current Outpatient Medications   Medication Sig Dispense Refill     albuterol (ALBUTEROL) 108 (90 BASE) MCG/ACT inhaler Inhale 2 puffs into the lungs every 4 hours as needed for shortness of breath / dyspnea 1 Inhaler 5     alteplase (CATHFLO ACTIVASE) 2 MG injection by Injection route ONCE.       amphetamine-dextroamphetamine (ADDERALL XR) 10 MG 24 hr capsule Take 10 mg by mouth every morning       cetirizine (ZYRTEC) 10 MG tablet Take 4 tablets (40 mg) by mouth At Bedtime 30 tablet      cyanocobalamin (VITAMIN B12) 1000 MCG/ML injection Inject 1 mL (1,000 mcg) into the muscle every 30 days Inject  intramuscular every 4 weeks.  May give multi-use vial. 10 mL 5     doxycycline hyclate (VIBRAMYCIN) 100 MG capsule Take 1 capsule (100 mg) by mouth 2 times daily 6 capsule 0     EPIPEN 2-JOSE 0.3 MG/0.3ML injection Inject 0.3 mg into the muscle once as needed        famotidine (PEPCID) 10 MG tablet Take 10 mg by mouth daily       FOLIC ACID PO Take 3 mg by mouth daily       HEPARIN SODIUM, PORCINE, IJ        hydrOXYzine (ATARAX) 25 MG tablet Take 1-4 tablets ( mg) by mouth every 6 hours as needed for anxiety 120 tablet 1     ipratropium - albuterol 0.5 mg/2.5 mg/3 mL (DUONEB) " 0.5-2.5 (3) MG/3ML nebulization Take 1 vial (3 mLs) by nebulization every 4 hours as needed for shortness of breath / dyspnea or wheezing 1 Box 11     losartan (COZAAR) 50 MG tablet Take 50 mg by mouth daily       methotrexate 50 MG/2ML injection Inject 0.8 mLs (20 mg) Subcutaneous once a week Hold until resumed by rheumatology. Previously receiving weekly on mondays       OMALIZUMAB SC Inject 300 mg Subcutaneous       ondansetron (ZOFRAN ODT) 4 MG ODT tab Take 1 tablet (4 mg) by mouth every 8 hours as needed for nausea 12 tablet 0     ondansetron (ZOFRAN) 2 MG/ML SOLN injection Inject 2 mLs (4 mg) into the vein as needed 3 TIMES WEEKLY PRN with PRN IV infusions  May repeat x 1 per individual infusion.  Please page the nurse at [phone] or call [phone] Option 4 with questions about this order. 2000 mL 20     ondansetron (ZOFRAN-ODT) 8 MG disintegrating tablet Take 1 tablet (8 mg) by mouth every 8 hours as needed for nausea 1 tablet 3 times daily. If not covered, try solution at same dosing. 90 tablet 3     pantoprazole (PROTONIX) 40 MG EC tablet Take 1 tablet (40 mg) by mouth daily 90 tablet 3     Prenatal Vit-Fe Fumarate-FA (PRENATAL MULTIVITAMIN  PLUS IRON) 27-0.8 MG TABS Take 1 tablet by mouth daily       Tirzepatide (MOUNJARO) 2.5 MG/0.5ML SOPN Inject 2.5 mg Subcutaneous       tiZANidine (ZANAFLEX) 4 MG tablet Take 1 tablet (4 mg) by mouth every 6 hours as needed for muscle spasms TAKE ONE TABLET BY MOUTH EVERY SIX HOURS AS NEEDED FOR MUSCLE SPASM 90 tablet 3     tocilizumab (ACTEMRA) 80 MG/4ML Reported on 3/16/2017         Systemic Review:   CONSTITUTIONAL: NEGATIVE for fever, chills, change in weight  ENT/MOUTH: NEGATIVE for ear, mouth and throat problems  RESP: NEGATIVE for significant cough or SOB  CV: NEGATIVE for chest pain, palpitations or peripheral edema    Physical Examination:   Vital Signs: /82 (BP Location: Right arm)   Pulse 90   Temp 97.7  F (36.5  C) (Temporal)   Resp 18   Ht  "1.549 m (5' 1\")   Wt 64 kg (141 lb)   LMP 11/21/2001   SpO2 98%   BMI 26.64 kg/m    GENERAL: healthy, alert and no distress  NECK: no adenopathy, no asymmetry, masses, or scars  RESP: lungs clear to auscultation - no rales, rhonchi or wheezes  CV: regular rate and rhythm, normal S1 S2, no S3 or S4, no murmur, click or rub, no peripheral edema and peripheral pulses strong  ABDOMEN: soft, nontender, no hepatosplenomegaly, no masses and bowel sounds normal  MS: no gross musculoskeletal defects noted, no edema    Plan: Appropriate to proceed as scheduled.      Tejas Orourke DO  1/19/2023    PCP:  Nikolay Baeza    "

## 2023-01-19 NOTE — ANESTHESIA PREPROCEDURE EVALUATION
Anesthesia Pre-Procedure Evaluation    Patient: Demetri Mosley   MRN: [id number] : 1980        Procedure : Procedure(s):  ESOPHAGOGASTRODUODENOSCOPY, WITH BRAVO PH MONITORING DEVICE INSERTION AND BALLOON DILATION          Past Medical History:   Diagnosis Date     Allergic rhinitis due to animal dander      Anemia      Arthritis     rheumatoid     Chronic abdominal pain      Cyclic vomiting syndrome      Diabetes (H)     type 2     Diagnostic skin and sensitization tests (aka ALLERGENS) 2/19/15 IgE tests pos. for: cat/dog/DM/T/G/RW, NEG  for fish and shellfish.     DVT (deep venous thrombosis) (H) 2005    s/p long hospitalization - anticoagulated x 6 months     Endometriosis 2009    total hysterectomy     Gastro-oesophageal reflux disease      House dust mite allergy     2/19/15 IgE tests pos. for: cat/dog/DM/T/G/RW, NEG  for fish and shellfish.     Migraines      parotid mass 2012    benign, left, removed     PONV (postoperative nausea and vomiting)      Pyelonephritis due to Escherichia coli 05/15/2016     Uncomplicated asthma       Past Surgical History:   Procedure Laterality Date     CHOLECYSTECTOMY       COLONOSCOPY N/A 2016    Procedure: COLONOSCOPY;  Surgeon: Edvin Summers MD;  Location:  GI     ENT SURGERY      T&A     ESOPHAGOSCOPY, GASTROSCOPY, DUODENOSCOPY (EGD), COMBINED N/A 2016    Procedure: COMBINED ESOPHAGOSCOPY, GASTROSCOPY, DUODENOSCOPY (EGD), BIOPSY SINGLE OR MULTIPLE;  Surgeon: Edvin Summers MD;  Location:  GI     ESOPHAGOSCOPY, GASTROSCOPY, DUODENOSCOPY (EGD), COMBINED N/A 2022    Procedure: ESOPHAGOGASTRODUODENOSCOPY, WITH BIOPSY;  Surgeon: Tejas Orourke DO;  [place] [facility]     ESOPHAGOSCOPY, GASTROSCOPY, DUODENOSCOPY (EGD), DILATATION, COMBINED N/A 2017    Procedure: COMBINED ESOPHAGOSCOPY, GASTROSCOPY, DUODENOSCOPY (EGD), DILATATION;  Surgeon: Corry Franco MD;  Location:  OR     GI SURGERY      ERCP     GYN SURGERY       hysterectomy     HC CAPSULE ENDOSCOPY N/A 9/13/2016    Procedure: CAPSULE/PILL CAM ENDOSCOPY;  Surgeon: Irja Choe MD;  Location:  GI     HC ESOPH/GAS REFLUX TEST W NASAL IMPED >1 HR N/A 2/17/2016    Procedure: ESOPHAGEAL IMPEDENCE FUNCTION TEST WITH 24 HOUR PH GREATER THAN 1 HOUR;  Surgeon: Charan Ozuna MD;  Location:  GI     HC UGI ENDOSCOPY W EUS  5/30/2013    Procedure: COMBINED ENDOSCOPIC ULTRASOUND, ESOPHAGOSCOPY, GASTROSCOPY, DUODENOSCOPY (EGD);  Surgeon: Juan Yu MD;  Location:  GI     HEAD & NECK SURGERY      tumor removed     HYSTERECTOMY, PAP NO LONGER INDICATED       INJECT BLOCK MEDIAL BRANCH CERVICAL/THORACIC/LUMBAR Bilateral 3/25/2015    Procedure: INJECT BLOCK MEDIAL BRANCH CERVICAL / THORACIC / LUMBAR;  Surgeon: Malvin Rivera MD;  Location: [facility]     INSERT PORT VASCULAR ACCESS N/A 9/12/2016    Procedure: INSERT PORT VASCULAR ACCESS;  Surgeon: Juani Casiano MD;  [facility]     LAPAROSCOPIC NISSEN FUNDOPLICATION N/A 12/23/2016    Procedure: LAPAROSCOPIC NISSEN FUNDOPLICATION;  Surgeon: Noah Brito MD;  [facility]: [facility]     ORTHOPEDIC SURGERY       RESECT FIRST RIB Left 5/1/2015    Procedure: RESECT FIRST RIB;  Surgeon: Tien Yang MD;  Location: [facility]     REVISE RECONSTRUCTED BREAST Bilateral 12/14/2022    Procedure: BILATERAL RECONSTRUCTED BREAST REVISION,;  Surgeon: Quinn Bowden MD;  Location: [facility]     REVISE SCAR TRUNK N/A 12/14/2022    Procedure: ABDOMINAL SCAR REVISION;  Surgeon: Quinn Bowden MD;  Location: [facility]      Allergies   Allergen Reactions     Propranolol Shortness Of Breath            Compazine Hives     Severe muscle cramping and bad anxiety     Fluoxetine Other (See Comments)     Seizures     Gabapentin Swelling     Swelling of hands and feet     Lyrica Swelling     Metoclopramide Other (See Comments)     Very irritable.  Very irritable.     Pregabalin Swelling     Prochlorperazine Other (See  "Comments)     Severe muscle cramping and bad anxiety     Reglan Other (See Comments)     Very irritable.     Vancomycin Other (See Comments)     Able to tolerate with pre-medication (diphenhydramine) and slow rate.  \"crissy Syndrome\"     Wheat Diarrhea     Wheat Bran GI Disturbance     Adhesive Tape Rash     Skin irritation     Contrast Dye Rash     Ct contrast dye  Rash over entire trunk     Diatrizoate Rash     CT contrast  Ct contrast dye  Rash over entire trunk     Dye Fdc Red [Red Dye] Rash     FD&C RED #40     Latex Hives and Rash     Liquid Adhesive Rash     Other [No Clinical Screening - See Comments] Rash     Diatrizoate meglumine     Penicillins Rash      Social History     Tobacco Use     Smoking status: Former     Packs/day: 0.25     Years: 5.00     Pack years: 1.25     Types: Cigarettes     Quit date: 2010     Years since quittin.6     Smokeless tobacco: Never   Substance Use Topics     Alcohol use: Yes     Comment: social - not that often.  Maybe one glass/week.      Wt Readings from Last 1 Encounters:   23 64 kg (141 lb)        Anesthesia Evaluation   Pt has had prior anesthetic. Type: General.    History of anesthetic complications  - PONV.      ROS/MED HX  ENT/Pulmonary:     (+) asthma     Neurologic:     (+) migraines,     Cardiovascular:     (+) hypertension-----    METS/Exercise Tolerance:     Hematologic:  - neg hematologic  ROS     Musculoskeletal:   (+) arthritis,     GI/Hepatic: Comment: S/P Nissen Fundoplication.  Cyclic vomiting disorder    (+) GERD, Asymptomatic on medication,     Renal/Genitourinary:  - neg Renal ROS     Endo:  - neg endo ROS     Psychiatric/Substance Use:  - neg psychiatric ROS     Infectious Disease:  - neg infectious disease ROS     Malignancy:  - neg malignancy ROS     Other:  - neg other ROS          Physical Exam    Airway  airway exam normal           Respiratory Devices and Support         Dental       (+) Completely normal " teeth      Cardiovascular   cardiovascular exam normal          Pulmonary   pulmonary exam normal                OUTSIDE LABS:  CBC:   Lab Results   Component Value Date    WBC 7.3 12/21/2016    WBC 4.1 11/10/2016    HGB 14.3 12/23/2016    HGB 14.3 12/21/2016    HCT 43.4 12/21/2016    HCT 40.9 11/10/2016     12/23/2016     12/21/2016     BMP:   Lab Results   Component Value Date     12/21/2016     11/09/2016    POTASSIUM 3.7 12/21/2016    POTASSIUM 3.8 11/09/2016    CHLORIDE 104 12/21/2016    CHLORIDE 108 11/09/2016    CO2 25 12/21/2016    CO2 24 11/09/2016    BUN 13 12/21/2016    BUN 9 11/09/2016    CR 0.83 12/23/2016    CR 0.79 12/21/2016    GLC 85 12/14/2022    GLC 83 12/21/2016     COAGS:   Lab Results   Component Value Date    PTT 25 09/12/2016    INR 1.13 09/12/2016     POC:   Lab Results   Component Value Date    HCG Negative 05/31/2016     HEPATIC:   Lab Results   Component Value Date    ALBUMIN 4.0 05/22/2019    PROTTOTAL 6.9 11/09/2016    ALT 26 11/09/2016    AST 20 11/09/2016    ALKPHOS 60 11/09/2016    BILITOTAL 0.8 11/09/2016     OTHER:   Lab Results   Component Value Date    LACT 0.3 (L) 08/13/2016    A1C 5.2 05/01/2015    OLE 9.3 12/21/2016    PHOS 3.6 05/25/2016    MAG 1.8 05/16/2016    LIPASE 114 08/12/2016    TSH 0.60 05/15/2016    CRP 4.8 08/13/2016    SED 8 06/24/2016       Anesthesia Plan    ASA Status:  2   NPO Status:  NPO Appropriate    Anesthesia Type: MAC.     - Reason for MAC: immobility needed   Induction: Intravenous.   Maintenance: TIVA.        Consents    Anesthesia Plan(s) and associated risks, benefits, and realistic alternatives discussed. Questions answered and patient/representative(s) expressed understanding.    - Discussed:     - Discussed with:  Patient      - Extended Intubation/Ventilatory Support Discussed: No.      - Patient is DNR/DNI Status: No    Use of blood products discussed: No .     Postoperative Care       PONV prophylaxis: Background  Propofol Infusion     Comments:           H&P reviewed: Unable to attach H&P to encounter due to EHR limitations. H&P Update: appropriate H&P reviewed, patient examined. No interval changes since H&P (within 30 days).         Cole Negron MD

## 2023-01-24 ENCOUNTER — NURSE TRIAGE (OUTPATIENT)
Dept: NURSING | Facility: CLINIC | Age: 43
End: 2023-01-24
Payer: COMMERCIAL

## 2023-01-24 ENCOUNTER — TELEPHONE (OUTPATIENT)
Dept: GASTROENTEROLOGY | Facility: CLINIC | Age: 43
End: 2023-01-24
Payer: COMMERCIAL

## 2023-01-24 NOTE — TELEPHONE ENCOUNTER
Spoke with pt about itchy eyes and hives following EGD.   Benadryl used, symptoms relieved somewhat  Pt did not endorse nickel allergy before procedure, asked multiple times prior to EGD.  Benadryl taken 2x daily and zyrtec since procedure.  Pt also using otc anti-itch cream.   No other symptoms to be concerning for severe reaction.  Pt does have epi-pens available to use as needed.  If itching and hives persists strongly, provider will be notified for further advisement.  Pt coming back into clinic on Friday to return bravo monitor, RN will assess again at that time.

## 2023-01-24 NOTE — TELEPHONE ENCOUNTER
The patient called regarding symptoms following her 1/19 EGD with Bravo placement. She has broken out in hives and believes she has a nickel allergy. The hives have not cleared up.   Warm-transferred to red flag triage nurse Dorys.

## 2023-01-24 NOTE — TELEPHONE ENCOUNTER
"1- pt had ESOPHAGOGASTRODUODENOSCOPY, WITH BRAVO PH MONITORING DEVICE INSERTION AND BALLOON DILATION performed. Right after the procedure while still in Endoscopy pt has extremely itchy eye and then later began to get hives. Since that time she has had continuous very itchy hives from her arms to her knees. One on the ankle as well.  There have been NO other changes in her life - meds/ cleaning products/beauty products etc except the Bravo capsule.    They get some what better with Bendadryl - but they come back full force once it wears off.    She has no tongue or throat swelling and difficulty breathing or SOB. She feels she is safe and would know when an ER visit were necessary.  However she is wondering when she can expect the capsule to pass and if there would be any possibility of a medrol dose pack or other treatment?    Reason for Disposition    MODERATE-SEVERE hives persist (i.e., hives interfere with normal activities or work) and taking antihistamine (e.g., Benadryl, Claritin) > 24 hours    Additional Information    Negative: Difficulty breathing or wheezing now    Negative: Rapid onset of swollen tongue    Negative: Rapid onset of hoarseness or cough    Negative: Very weak (can't stand)    Negative: Difficult to awaken or acting confused (e.g., disoriented, slurred speech)    Negative: Life-threatening reaction (anaphylaxis) in the past to similar substance (e.g., food, insect bite/sting, chemical, etc.) and < 2 hours since exposure    Negative: Sounds like a life-threatening emergency to the triager    Negative: Bee, wasp, or yellow jacket sting within last 24 hours    Negative: Taking a new medicine now or within last 3 days  (Exception: Antihistamine, decongestant or other OTC cough/cold medicines.)    Negative: Doesn't match the SYMPTOMS of hives    Negative: Patient sounds very sick or weak to the triager    Answer Assessment - Initial Assessment Questions  1. APPEARANCE: \"What does the rash " "look like?\"       Large welts and then then they get larger when scratched    2. LOCATION: \"Where is the rash located?\"       Arms to knees - only one on the ankle    3. NUMBER: \"How many hives are there?\"       More than 20    4. SIZE: \"How big are the hives?\" (inches, cm, compare to coins) \"Do they all look the same or is there lots of variation in shape and size?\"       Lots of variation - Friday it was one whole side    5. ONSET: \"When did the hives begin?\" (Hours or days ago)       1-19 after procedure with eye itching and just continued to get worse    6. ITCHING: \"Does it itch?\" If Yes, ask: \"How bad is the itch?\"     - MILD: doesn't interfere with normal activities    - MODERATE-SEVERE: interferes with work, school, sleep, or other activities       Severe    7. RECURRENT PROBLEM: \"Have you had hives before?\" If Yes, ask: \"When was the last time?\" and \"What happened that time?\"       No - only to drugs many years ago.    8. TRIGGERS: \"Were you exposed to any new food, plant, cosmetic product or animal just before the hives began?\"      Only thing new is capsule from endoscopy    9. OTHER SYMPTOMS: \"Do you have any other symptoms?\" (e.g., fever, tongue swelling, difficulty breathing, abdominal pain)      NO    10. PREGNANCY: \"Is there any chance you are pregnant?\" \"When was your last menstrual period?\"        NO    Protocols used: HIVES-A-[place]      "

## 2023-01-27 ENCOUNTER — OFFICE VISIT (OUTPATIENT)
Dept: SURGERY | Facility: CLINIC | Age: 43
End: 2023-01-27
Payer: COMMERCIAL

## 2023-01-27 ENCOUNTER — ALLIED HEALTH/NURSE VISIT (OUTPATIENT)
Dept: GASTROENTEROLOGY | Facility: CLINIC | Age: 43
End: 2023-01-27
Payer: COMMERCIAL

## 2023-01-27 DIAGNOSIS — K21.9 GASTROESOPHAGEAL REFLUX DISEASE, UNSPECIFIED WHETHER ESOPHAGITIS PRESENT: ICD-10-CM

## 2023-01-27 DIAGNOSIS — R09.A2 GLOBUS SENSATION: Primary | ICD-10-CM

## 2023-01-27 DIAGNOSIS — Z90.13 ABSENCE OF BOTH BREASTS: Primary | ICD-10-CM

## 2023-01-27 PROCEDURE — 99024 POSTOP FOLLOW-UP VISIT: CPT | Performed by: STUDENT IN AN ORGANIZED HEALTH CARE EDUCATION/TRAINING PROGRAM

## 2023-01-27 NOTE — PROGRESS NOTES
PRS    No issues.  Very happy with the result.  She has lost about 30 pounds since the last visit.  She is scheduled for revision surgery with possible fat grafting and nipple reconstruction in April.    On exam, bilateral breast incisions are well-healed with no signs of infection and no wounds.  Excellent symmetry.  Bilateral lower abdominal dogear excisions well-healed.    A/P: 42-year-old female 6-week status post bilateral reconstructed breast revision, bilateral lower abdominal dogear excision, doing well    -Explained that if patient loses additional weight, this may deflate the reconstructed breast slightly and we may have to do an additional revision of the reconstructed breast.  Patient understands this.  This is no issue, just patient needs to be aware.  -Cleared for all activities including soaking and swimming  -Continue scar treatment with silicone-based sheeting or ointment  -Scheduled for another surgery in April    Quinn Bowden MD, PhD

## 2023-01-27 NOTE — PROGRESS NOTES
Bravo monitor and diary returned; accuracy of entries verified with patient.   Hardy study completion confirmed.  Data uploaded and sent to reading provider for interpretation.  Reading Provider:  Dr Orourke

## 2023-01-27 NOTE — LETTER
1/27/2023         RE: Demetri Mosley  [address] [facility] [address]        Dear Colleague,    Thank you for referring your patient, Demetri Mosley, to the [facility]. Please see a copy of my visit note below.    PRS    No issues.  Very happy with the result.  She has lost about 30 pounds since the last visit.  She is scheduled for revision surgery with possible fat grafting and nipple reconstruction in April.    On exam, bilateral breast incisions are well-healed with no signs of infection and no wounds.  Excellent symmetry.  Bilateral lower abdominal dogear excisions well-healed.    A/P: 42-year-old female 6-week status post bilateral reconstructed breast revision, bilateral lower abdominal dogear excision, doing well    -Explained that if patient loses additional weight, this may deflate the reconstructed breast slightly and we may have to do an additional revision of the reconstructed breast.  Patient understands this.  This is no issue, just patient needs to be aware.  -Cleared for all activities including soaking and swimming  -Continue scar treatment with silicone-based sheeting or ointment  -Scheduled for another surgery in April    Quinn Bowden MD, PhD      Again, thank you for allowing me to participate in the care of your patient.        Sincerely,        Quinn Bowden MD

## 2023-01-27 NOTE — NURSING NOTE
Demetri Mosley's goals for this visit include:   Chief Complaint   Patient presents with     Surgical Followup     Post op, breast revision       She requests these members of her care team be copied on today's visit information: no    PCP: Nikolay Baeza    Referring Provider:  Quinn Bowden MD  [facility]  [address]  [place] [address]    LMP 11/21/2001     Do you need any medication refills at today's visit? No    Melba Castano LPN

## 2023-01-31 NOTE — TELEPHONE ENCOUNTER
"              Post Op Discharge Call     Surgery:    BILATERAL RECONSTRUCTED BREAST REVISION,   ABDOMINAL SCAR REVISION          Surgery date: 12/14/2022     Discharge Date:  12/14/2022    Date of Post-op Call: 12/20/2022       Immediate Concerns:  Spoke with pt they pulled off the ACE wrap the steri strips were stuck to the ACE wrap and pt reports it caused a blister on the right breast incision. Pt states\" its fine\" and she knows there is not much that can be done. RN instructed pt to apply vaseline to the site and continue to monitor, Left breast has a tiny hole that has appeared due the surgical glue being stuck to the ACE wrap. The hole is not draining and not getting larger at the present time. Pt states she will continue to monitor and will reach out to number provided [phone] if she has any further concerns. Ace wrap being used versus sports bra per pt as ACE wrap is more comfortable.          Pain:  No concerns with pain management.   Using pain medications as recommended with appropriate relief.   Discussed using medication only as needed. Not scheduled.      Incision:   No concerns, healing well, no redness, drainage or edema reported.      Drains:   Drain to thumb print suction.      Diet:   Regular diet   Denies nausea and vomiting.   Discussed advancing diet as tolerated.      Bowels:   Passing gas.   Taking stool softeners daily.   Denies feelings of constipation and cramping.      Activity:   No difficulty with ADLs reported.   Patient is up independently at home.   Encourage patient to continue to stay active.      Post op/follow up plans:  Future post op appt on 12/30/2022     Post op appointment scheduled,confirmed date and time with patient. Contact number provided for any additional questions or concerns.       Mirian Uribe  [facility]  Surgical Oncology       Future Appointments: 12/30/22 with               " Yes

## 2023-02-20 ENCOUNTER — VIRTUAL VISIT (OUTPATIENT)
Dept: GASTROENTEROLOGY | Facility: CLINIC | Age: 43
End: 2023-02-20
Payer: COMMERCIAL

## 2023-02-20 VITALS — WEIGHT: 135 LBS | BODY MASS INDEX: 25.51 KG/M2

## 2023-02-20 DIAGNOSIS — Z53.9 ERRONEOUS ENCOUNTER--DISREGARD: Primary | ICD-10-CM

## 2023-02-20 ASSESSMENT — PAIN SCALES - GENERAL: PAINLEVEL: NO PAIN (0)

## 2023-02-20 NOTE — LETTER
2/20/2023         RE: Demetri Mosley  [address] [facility] [address]        Dear Colleague,    Thank you for referring your patient, Demetri Mosley, to the [facility] [place]. Please see a copy of my visit note below.    Erroneous Encounter patient was unable to attend office visit.       Again, thank you for allowing me to participate in the care of your patient.        Sincerely,        Karishma Linares PA-C

## 2023-02-20 NOTE — LETTER
Date:February 23, 2023      Provider requested that no letter be sent. Do not send.       [facility]

## 2023-03-15 ENCOUNTER — E-VISIT (OUTPATIENT)
Dept: URGENT CARE | Facility: CLINIC | Age: 43
End: 2023-03-15
Payer: COMMERCIAL

## 2023-03-15 ENCOUNTER — OFFICE VISIT (OUTPATIENT)
Dept: URGENT CARE | Facility: URGENT CARE | Age: 43
End: 2023-03-15
Payer: COMMERCIAL

## 2023-03-15 VITALS
DIASTOLIC BLOOD PRESSURE: 85 MMHG | SYSTOLIC BLOOD PRESSURE: 129 MMHG | TEMPERATURE: 98.1 F | WEIGHT: 132 LBS | RESPIRATION RATE: 16 BRPM | HEART RATE: 60 BPM | BODY MASS INDEX: 24.94 KG/M2 | OXYGEN SATURATION: 100 %

## 2023-03-15 DIAGNOSIS — J20.9 ACUTE BRONCHITIS WITH SYMPTOMS GREATER THAN 10 DAYS: Primary | ICD-10-CM

## 2023-03-15 DIAGNOSIS — D68.59 PRIMARY HYPERCOAGULABLE STATE (H): ICD-10-CM

## 2023-03-15 DIAGNOSIS — E27.8 ADRENAL HYPERPLASIA (H): ICD-10-CM

## 2023-03-15 DIAGNOSIS — Z53.9 DIAGNOSIS NOT YET DEFINED: Primary | ICD-10-CM

## 2023-03-15 DIAGNOSIS — J45.20 INTERMITTENT ASTHMA WITHOUT COMPLICATION, UNSPECIFIED ASTHMA SEVERITY: ICD-10-CM

## 2023-03-15 DIAGNOSIS — M06.9 RHEUMATOID ARTHRITIS, INVOLVING UNSPECIFIED SITE, UNSPECIFIED WHETHER RHEUMATOID FACTOR PRESENT (H): ICD-10-CM

## 2023-03-15 DIAGNOSIS — D84.9 IMMUNODEFICIENCY DISORDER (H): ICD-10-CM

## 2023-03-15 PROCEDURE — 99214 OFFICE O/P EST MOD 30 MIN: CPT | Performed by: NURSE PRACTITIONER

## 2023-03-15 RX ORDER — PREDNISONE 20 MG/1
20 TABLET ORAL DAILY
Qty: 5 TABLET | Refills: 0 | Status: SHIPPED | OUTPATIENT
Start: 2023-03-15 | End: 2023-03-20

## 2023-03-15 RX ORDER — AZITHROMYCIN 250 MG/1
TABLET, FILM COATED ORAL
Qty: 6 TABLET | Refills: 0 | Status: SHIPPED | OUTPATIENT
Start: 2023-03-15 | End: 2023-03-20

## 2023-03-15 NOTE — PROGRESS NOTES
Assessment & Plan     1. Acute bronchitis with symptoms greater than 10 days  Patient with immunocompromised history symptoms of respiratory infection for greater than 10 days would recommend that we treat with oral antibiotic side effects were discussed patient also given a prescription for oral prednisone secondary to asthma and wheezing.  Red flag symptoms that would warrant emergent evaluation were reviewed with patient side effects to medications were reviewed with patient as well.  Patient verbalized understanding.  - azithromycin (ZITHROMAX) 250 MG tablet; Take 2 tablets (500 mg) by mouth daily for 1 day, THEN 1 tablet (250 mg) daily for 4 days.  Dispense: 6 tablet; Refill: 0  - predniSONE (DELTASONE) 20 MG tablet; Take 1 tablet (20 mg) by mouth daily for 5 days  Dispense: 5 tablet; Refill: 0    2. Intermittent asthma without complication, unspecified asthma severity    - predniSONE (DELTASONE) 20 MG tablet; Take 1 tablet (20 mg) by mouth daily for 5 days  Dispense: 5 tablet; Refill: 0    3. Immunodeficiency disorder (H)      4. Adrenal hyperplasia (H)      5. Rheumatoid arthritis, involving unspecified site, unspecified whether rheumatoid factor present (H)      6. Primary hypercoagulable state (H)      EDEL Valentine [facility]    Kiara Mosquera is a 43 year old female who presents to clinic today for the following health issues:  Chief Complaint   Patient presents with     Urgent Care     Cough     Per patient has been having a cough for over a week, started not being productive but now is productive, with wheezing but she does have asthma. Patient states daughter had similar symptoms and was given  steroid and zpack and was better within two day once she finished meds she started having symptoms.      HPI    Patient with history of approximately 2 weeks states that she has been having wheezing mild asthma exacerbation she has been needing to use her rescue  inhaler more frequently states that her cough has been mildly productive with some sinus congestion and mild headache.  Denies shortness of breath or dyspnea.  Denies chest pain.      Review of Systems  Constitutional, HEENT, cardiovascular, pulmonary, gi and gu systems are negative, except as otherwise noted.      Objective    /85   Pulse 60   Temp 98.1  F (36.7  C) (Tympanic)   Resp 16   Wt 59.9 kg (132 lb)   LMP 11/21/2001   SpO2 100%   BMI 24.94 kg/m    Physical Exam   GENERAL: alert and no distress  EYES: Eyes grossly normal to inspection, PERRL and conjunctivae and sclerae normal  HENT: normal cephalic/atraumatic, ear canals and TM's normal, nose and mouth without ulcers or lesions, nasal mucosa edematous , rhinorrhea clear, oropharynx clear and oral mucous membranes moist  NECK: bilateral anterior cervical adenopathy, no asymmetry, masses, or scars and thyroid normal to palpation  RESP: lungs clear to auscultation - no rales, rhonchi, wheezing expiratory left upper posterior.   CV: regular rate and rhythm, normal S1 S2, no S3 or S4, no murmur, click or rub, no peripheral edema and peripheral pulses strong  ABDOMEN: soft, nontender, no hepatosplenomegaly, no masses and bowel sounds normal  MS: no gross musculoskeletal defects noted, no edema

## 2023-03-15 NOTE — PATIENT INSTRUCTIONS
Dear Demetri Mosley,    We are sorry you are not feeling well. Based on the responses you provided, it is recommended that you be seen in-person in urgent care so we can better evaluate your symptoms. Please click here to find the nearest urgent care location to you.   You will not be charged for this Visit. Thank you for trusting us with your care.    Claire Baker PA-C

## 2023-03-20 ENCOUNTER — MYC MEDICAL ADVICE (OUTPATIENT)
Dept: GASTROENTEROLOGY | Facility: CLINIC | Age: 43
End: 2023-03-20
Payer: COMMERCIAL

## 2023-03-26 ENCOUNTER — HEALTH MAINTENANCE LETTER (OUTPATIENT)
Age: 43
End: 2023-03-26

## 2023-03-27 NOTE — TELEPHONE ENCOUNTER
Called to remind patient of their upcoming appointment with our GI clinic, on Tuesday 4/4/2023 at 11:00AM with Karishma Linares . This appointment is scheduled as a video visit. You will receive a call approximately 30 minutes prior to check you in, you must be in [place] for this visit., if your appointment is virtual (video or telephone) you need to be in [place] for the visit. To reschedule or cancel patient to call [phone].    Flor Harris MA

## 2023-04-17 RX ORDER — SEMAGLUTIDE 1.34 MG/ML
0.5 INJECTION, SOLUTION SUBCUTANEOUS
COMMUNITY

## 2023-04-18 ENCOUNTER — ANESTHESIA EVENT (OUTPATIENT)
Dept: SURGERY | Facility: AMBULATORY SURGERY CENTER | Age: 43
End: 2023-04-18
Payer: COMMERCIAL

## 2023-04-18 RX ORDER — FENTANYL CITRATE 50 UG/ML
25 INJECTION, SOLUTION INTRAMUSCULAR; INTRAVENOUS EVERY 5 MIN PRN
Status: CANCELLED | OUTPATIENT
Start: 2023-04-18

## 2023-04-18 RX ORDER — HYDRALAZINE HYDROCHLORIDE 20 MG/ML
2.5-5 INJECTION INTRAMUSCULAR; INTRAVENOUS EVERY 10 MIN PRN
Status: CANCELLED | OUTPATIENT
Start: 2023-04-18

## 2023-04-18 RX ORDER — OXYCODONE HYDROCHLORIDE 5 MG/1
10 TABLET ORAL EVERY 4 HOURS PRN
Status: CANCELLED | OUTPATIENT
Start: 2023-04-18

## 2023-04-18 RX ORDER — ONDANSETRON 2 MG/ML
4 INJECTION INTRAMUSCULAR; INTRAVENOUS EVERY 30 MIN PRN
Status: CANCELLED | OUTPATIENT
Start: 2023-04-18

## 2023-04-18 RX ORDER — METOPROLOL TARTRATE 1 MG/ML
1-2 INJECTION, SOLUTION INTRAVENOUS EVERY 5 MIN PRN
Status: CANCELLED | OUTPATIENT
Start: 2023-04-18

## 2023-04-18 RX ORDER — FENTANYL CITRATE 50 UG/ML
25 INJECTION, SOLUTION INTRAMUSCULAR; INTRAVENOUS
Status: CANCELLED | OUTPATIENT
Start: 2023-04-18

## 2023-04-18 RX ORDER — ONDANSETRON 4 MG/1
4 TABLET, ORALLY DISINTEGRATING ORAL EVERY 30 MIN PRN
Status: CANCELLED | OUTPATIENT
Start: 2023-04-18

## 2023-04-18 RX ORDER — SODIUM CHLORIDE, SODIUM LACTATE, POTASSIUM CHLORIDE, CALCIUM CHLORIDE 600; 310; 30; 20 MG/100ML; MG/100ML; MG/100ML; MG/100ML
INJECTION, SOLUTION INTRAVENOUS CONTINUOUS
Status: CANCELLED | OUTPATIENT
Start: 2023-04-18

## 2023-04-18 RX ORDER — FENTANYL CITRATE 50 UG/ML
50 INJECTION, SOLUTION INTRAMUSCULAR; INTRAVENOUS EVERY 5 MIN PRN
Status: CANCELLED | OUTPATIENT
Start: 2023-04-18

## 2023-04-18 RX ORDER — OXYCODONE HYDROCHLORIDE 5 MG/1
5 TABLET ORAL EVERY 4 HOURS PRN
Status: CANCELLED | OUTPATIENT
Start: 2023-04-18

## 2023-04-19 ENCOUNTER — HOSPITAL ENCOUNTER (OUTPATIENT)
Facility: AMBULATORY SURGERY CENTER | Age: 43
Discharge: HOME | End: 2023-04-19
Attending: STUDENT IN AN ORGANIZED HEALTH CARE EDUCATION/TRAINING PROGRAM | Admitting: STUDENT IN AN ORGANIZED HEALTH CARE EDUCATION/TRAINING PROGRAM
Payer: COMMERCIAL

## 2023-04-19 ENCOUNTER — ANESTHESIA (OUTPATIENT)
Dept: SURGERY | Facility: AMBULATORY SURGERY CENTER | Age: 43
End: 2023-04-19
Payer: COMMERCIAL

## 2023-04-19 VITALS
OXYGEN SATURATION: 99 % | HEART RATE: 76 BPM | WEIGHT: 125.13 LBS | SYSTOLIC BLOOD PRESSURE: 89 MMHG | RESPIRATION RATE: 21 BRPM | TEMPERATURE: 99.4 F | DIASTOLIC BLOOD PRESSURE: 67 MMHG | BODY MASS INDEX: 23.63 KG/M2 | HEIGHT: 61 IN

## 2023-04-19 DIAGNOSIS — Z90.13 ABSENCE OF BOTH BREASTS: Primary | ICD-10-CM

## 2023-04-19 LAB — GLUCOSE BLDC GLUCOMTR-MCNC: 83 MG/DL (ref 70–99)

## 2023-04-19 PROCEDURE — 11406 EXC TR-EXT B9+MARG >4.0 CM: CPT | Performed by: STUDENT IN AN ORGANIZED HEALTH CARE EDUCATION/TRAINING PROGRAM

## 2023-04-19 PROCEDURE — 19350 NIPPLE/AREOLA RECONSTRUCTION: CPT | Mod: 50

## 2023-04-19 PROCEDURE — 15771 GRFG AUTOL FAT LIPO 50 CC/<: CPT | Performed by: STUDENT IN AN ORGANIZED HEALTH CARE EDUCATION/TRAINING PROGRAM

## 2023-04-19 PROCEDURE — G8916 PT W IV AB GIVEN ON TIME: HCPCS

## 2023-04-19 PROCEDURE — 19350 NIPPLE/AREOLA RECONSTRUCTION: CPT | Mod: 50 | Performed by: STUDENT IN AN ORGANIZED HEALTH CARE EDUCATION/TRAINING PROGRAM

## 2023-04-19 PROCEDURE — 19380 REVJ RECONSTRUCTED BREAST: CPT | Mod: 50

## 2023-04-19 PROCEDURE — G8907 PT DOC NO EVENTS ON DISCHARG: HCPCS

## 2023-04-19 RX ORDER — CLINDAMYCIN PHOSPHATE 900 MG/50ML
900 INJECTION, SOLUTION INTRAVENOUS SEE ADMIN INSTRUCTIONS
Status: DISCONTINUED | OUTPATIENT
Start: 2023-04-19 | End: 2023-04-20 | Stop reason: HOSPADM

## 2023-04-19 RX ORDER — DEXMEDETOMIDINE HYDROCHLORIDE 4 UG/ML
INJECTION, SOLUTION INTRAVENOUS PRN
Status: DISCONTINUED | OUTPATIENT
Start: 2023-04-19 | End: 2023-04-19

## 2023-04-19 RX ORDER — PROPOFOL 10 MG/ML
INJECTION, EMULSION INTRAVENOUS PRN
Status: DISCONTINUED | OUTPATIENT
Start: 2023-04-19 | End: 2023-04-19

## 2023-04-19 RX ORDER — PROPOFOL 10 MG/ML
INJECTION, EMULSION INTRAVENOUS CONTINUOUS PRN
Status: DISCONTINUED | OUTPATIENT
Start: 2023-04-19 | End: 2023-04-19

## 2023-04-19 RX ORDER — LIDOCAINE HYDROCHLORIDE AND EPINEPHRINE 5; 5 MG/ML; UG/ML
INJECTION, SOLUTION INFILTRATION; PERINEURAL PRN
Status: DISCONTINUED | OUTPATIENT
Start: 2023-04-19 | End: 2023-04-19 | Stop reason: HOSPADM

## 2023-04-19 RX ORDER — CLINDAMYCIN PHOSPHATE 900 MG/50ML
900 INJECTION, SOLUTION INTRAVENOUS
Status: COMPLETED | OUTPATIENT
Start: 2023-04-19 | End: 2023-04-19

## 2023-04-19 RX ORDER — LIDOCAINE 40 MG/G
CREAM TOPICAL
Status: DISCONTINUED | OUTPATIENT
Start: 2023-04-19 | End: 2023-04-20 | Stop reason: HOSPADM

## 2023-04-19 RX ORDER — ONDANSETRON 4 MG/1
4 TABLET, FILM COATED ORAL EVERY 8 HOURS PRN
Qty: 12 TABLET | Refills: 0 | Status: SHIPPED | OUTPATIENT
Start: 2023-04-19 | End: 2024-04-08

## 2023-04-19 RX ORDER — DOXYCYCLINE 100 MG/1
100 CAPSULE ORAL 2 TIMES DAILY
Qty: 6 CAPSULE | Refills: 0 | Status: SHIPPED | OUTPATIENT
Start: 2023-04-19 | End: 2024-04-08

## 2023-04-19 RX ORDER — DEXAMETHASONE SODIUM PHOSPHATE 4 MG/ML
INJECTION, SOLUTION INTRA-ARTICULAR; INTRALESIONAL; INTRAMUSCULAR; INTRAVENOUS; SOFT TISSUE PRN
Status: DISCONTINUED | OUTPATIENT
Start: 2023-04-19 | End: 2023-04-19

## 2023-04-19 RX ORDER — ONDANSETRON 2 MG/ML
INJECTION INTRAMUSCULAR; INTRAVENOUS PRN
Status: DISCONTINUED | OUTPATIENT
Start: 2023-04-19 | End: 2023-04-19

## 2023-04-19 RX ORDER — GINSENG 100 MG
CAPSULE ORAL PRN
Status: DISCONTINUED | OUTPATIENT
Start: 2023-04-19 | End: 2023-04-19 | Stop reason: HOSPADM

## 2023-04-19 RX ORDER — SODIUM CHLORIDE, SODIUM LACTATE, POTASSIUM CHLORIDE, CALCIUM CHLORIDE 600; 310; 30; 20 MG/100ML; MG/100ML; MG/100ML; MG/100ML
INJECTION, SOLUTION INTRAVENOUS CONTINUOUS
Status: DISCONTINUED | OUTPATIENT
Start: 2023-04-19 | End: 2023-04-20 | Stop reason: HOSPADM

## 2023-04-19 RX ORDER — LIDOCAINE HYDROCHLORIDE 20 MG/ML
INJECTION, SOLUTION INFILTRATION; PERINEURAL PRN
Status: DISCONTINUED | OUTPATIENT
Start: 2023-04-19 | End: 2023-04-19

## 2023-04-19 RX ORDER — FENTANYL CITRATE 0.05 MG/ML
INJECTION, SOLUTION INTRAMUSCULAR; INTRAVENOUS PRN
Status: DISCONTINUED | OUTPATIENT
Start: 2023-04-19 | End: 2023-04-19

## 2023-04-19 RX ORDER — ACETAMINOPHEN 325 MG/1
975 TABLET ORAL ONCE
Status: COMPLETED | OUTPATIENT
Start: 2023-04-19 | End: 2023-04-19

## 2023-04-19 RX ADMIN — DEXMEDETOMIDINE HYDROCHLORIDE 10 MCG: 4 INJECTION, SOLUTION INTRAVENOUS at 07:19

## 2023-04-19 RX ADMIN — Medication 100 MCG: at 07:37

## 2023-04-19 RX ADMIN — FENTANYL CITRATE 50 MCG: 0.05 INJECTION, SOLUTION INTRAMUSCULAR; INTRAVENOUS at 07:23

## 2023-04-19 RX ADMIN — PROPOFOL 150 MG: 10 INJECTION, EMULSION INTRAVENOUS at 07:05

## 2023-04-19 RX ADMIN — CLINDAMYCIN PHOSPHATE 900 MG: 900 INJECTION, SOLUTION INTRAVENOUS at 06:58

## 2023-04-19 RX ADMIN — Medication 100 MCG: at 07:49

## 2023-04-19 RX ADMIN — Medication 100 MCG: at 07:29

## 2023-04-19 RX ADMIN — Medication 100 MCG: at 08:22

## 2023-04-19 RX ADMIN — PROPOFOL 50 MG: 10 INJECTION, EMULSION INTRAVENOUS at 07:14

## 2023-04-19 RX ADMIN — Medication 0.3 MG: at 07:10

## 2023-04-19 RX ADMIN — Medication 100 MCG: at 08:11

## 2023-04-19 RX ADMIN — Medication 0.2 MG: at 07:16

## 2023-04-19 RX ADMIN — DEXAMETHASONE SODIUM PHOSPHATE 4 MG: 4 INJECTION, SOLUTION INTRA-ARTICULAR; INTRALESIONAL; INTRAMUSCULAR; INTRAVENOUS; SOFT TISSUE at 07:54

## 2023-04-19 RX ADMIN — ACETAMINOPHEN 975 MG: 325 TABLET ORAL at 06:17

## 2023-04-19 RX ADMIN — Medication 100 MCG: at 08:01

## 2023-04-19 RX ADMIN — Medication 100 MCG: at 08:16

## 2023-04-19 RX ADMIN — Medication 100 MCG: at 07:55

## 2023-04-19 RX ADMIN — SODIUM CHLORIDE, SODIUM LACTATE, POTASSIUM CHLORIDE, CALCIUM CHLORIDE: 600; 310; 30; 20 INJECTION, SOLUTION INTRAVENOUS at 06:31

## 2023-04-19 RX ADMIN — LIDOCAINE HYDROCHLORIDE 40 MG: 20 INJECTION, SOLUTION INFILTRATION; PERINEURAL at 07:05

## 2023-04-19 RX ADMIN — ONDANSETRON 4 MG: 2 INJECTION INTRAMUSCULAR; INTRAVENOUS at 08:18

## 2023-04-19 RX ADMIN — PROPOFOL 150 MCG/KG/MIN: 10 INJECTION, EMULSION INTRAVENOUS at 07:08

## 2023-04-19 RX ADMIN — PROPOFOL 50 MG: 10 INJECTION, EMULSION INTRAVENOUS at 07:22

## 2023-04-19 RX ADMIN — Medication 100 MCG: at 07:43

## 2023-04-19 NOTE — PROGRESS NOTES
PRS    No changes in history or exam, except has lost some weight. Happy with shape of the breasts.    On exam, bilateral reconstructed breasts with good symmetry. Some lipodystrophy. Tiny right lower abdominal dog-ear.     A/P: 43F s/p BL SUBHA recon, second-stage revision p/f NAC recon, R lower abdominal dog-ear excision, fat grafting from the abdomen to the upper poles of the breasts    -OR today for BILATERAL nipple-areolar reconstruction, BILATERAL upper pole fat grafting from the abdomen and/or flanks, RIGHT lower abdominal dog-ear excision with complex closure  -Discussed the risks of surgery, including but not limited to: infection, bleeding, pain, poor scarring, wound healing issues, asymmetries, need for revision surgery, suboptimal aesthetic result. Despite these risks, patient consents to surgery.   -Discharge from PACU    Quinn Bowden MD, PhD

## 2023-04-19 NOTE — ANESTHESIA POSTPROCEDURE EVALUATION
Patient: Demetri Mosley    Procedure: Procedure(s):  BILATERAL NIPPLE AREOLAR RECONSTRUCTION, BILATERAL RECONSTRUCTED BREAST UPPER POLE FAT GRAFTING WITH FAT  SURGICAL PROCUREMENT, FAT GRAFT  REVISION, SCAR, TORSO, POSSIBLE RIGHT LOWER ABDOMINAL DOG-EAR EXCISION  LIPOSUCTIONED FROM ABDOMEN AND/OR FLANKS       Anesthesia Type:  General    Note:  Disposition: Outpatient   Postop Pain Control: Uneventful            Sign Out: Well controlled pain   PONV: No   Neuro/Psych: Uneventful            Sign Out: Acceptable/Baseline neuro status   Airway/Respiratory: Uneventful            Sign Out: Acceptable/Baseline resp. status   CV/Hemodynamics: Uneventful            Sign Out: Acceptable CV status; No obvious hypovolemia; No obvious fluid overload   Other NRE: NONE   DID A NON-ROUTINE EVENT OCCUR? No           Last vitals:  Vitals Value Taken Time   BP 91/57 04/19/23 0900   Temp 97.6  F (36.4  C) 04/19/23 0900   Pulse 84 04/19/23 0904   Resp 13 04/19/23 0904   SpO2 100 % 04/19/23 0904   Vitals shown include unvalidated device data.    Electronically Signed By: Gustavo Brown DO  April 19, 2023  11:55 AM

## 2023-04-19 NOTE — ANESTHESIA CARE TRANSFER NOTE
Patient: Demetri Mosley    Procedure: Procedure(s):  BILATERAL NIPPLE AREOLAR RECONSTRUCTION, BILATERAL RECONSTRUCTED BREAST UPPER POLE FAT GRAFTING WITH FAT  SURGICAL PROCUREMENT, FAT GRAFT  REVISION, SCAR, TORSO, POSSIBLE RIGHT LOWER ABDOMINAL DOG-EAR EXCISION  LIPOSUCTIONED FROM ABDOMEN AND/OR FLANKS       Diagnosis: Absence of both breasts [Z90.13]  Diagnosis Additional Information: No value filed.    Anesthesia Type:   General     Note:    Oropharynx: oropharynx clear of all foreign objects and spontaneously breathing  Level of Consciousness: drowsy and awake  Oxygen Supplementation: face mask  Level of Supplemental Oxygen (L/min / FiO2): 6  Independent Airway: airway patency satisfactory and stable  Dentition: dentition unchanged  Vital Signs Stable: post-procedure vital signs reviewed and stable  Report to RN Given: handoff report given  Patient transferred to: PACU    Handoff Report: Identifed the Patient, Identified the Reponsible Provider, Reviewed the pertinent medical history, Discussed the surgical course, Reviewed Intra-OP anesthesia mangement and issues during anesthesia, Set expectations for post-procedure period and Allowed opportunity for questions and acknowledgement of understanding      Vitals:  Vitals Value Taken Time   BP 90/54 04/19/23 0840   Temp 97  F (36.1  C) 04/19/23 0840   Pulse 66 04/19/23 0846   Resp 7 04/19/23 0846   SpO2 100 % 04/19/23 0846   Vitals shown include unvalidated device data.    Electronically Signed By: EDEL Hollingsworth CRNA  April 19, 2023  8:47 AM

## 2023-04-19 NOTE — BRIEF OP NOTE
[facility]    Brief Operative Note    Pre-operative diagnosis: Absence of both breasts [Z90.13]  Post-operative diagnosis Same as pre-operative diagnosis    Procedure: Procedure(s):  BILATERAL NIPPLE AREOLAR RECONSTRUCTION, BILATERAL RECONSTRUCTED BREAST UPPER POLE FAT GRAFTING WITH FAT  SURGICAL PROCUREMENT, FAT GRAFT  REVISION, SCAR, TORSO, POSSIBLE RIGHT LOWER ABDOMINAL DOG-EAR EXCISION  LIPOSUCTIONED FROM ABDOMEN AND/OR FLANKS  Surgeon: Surgeon(s) and Role:     * Quinn Bowden MD - Primary     * Yasmine Bryant PA-C - Assisting  Anesthesia: General   Estimated Blood Loss: 1mL    Drains: None  Specimens: * No specimens in log *  Findings:   None.  Complications: None.  Implants: * No implants in log *

## 2023-04-19 NOTE — DISCHARGE INSTRUCTIONS
[facility]   Adult Discharge Orders & Instructions     For 24 hours after surgery    Get plenty of rest.  A responsible adult must stay with you for at least 24 hours after you leave the hospital.   Do not drive or use heavy equipment.  If you have weakness or tingling, don't drive or use heavy equipment until this feeling goes away.  Do not drink alcohol.  Avoid strenuous or risky activities.  Ask for help when climbing stairs.   You may feel lightheaded.  IF so, sit for a few minutes before standing.  Have someone help you get up.   If you have nausea (feel sick to your stomach): Drink only clear liquids such as apple juice, ginger ale, broth or 7-Up.  Rest may also help.  Be sure to drink enough fluids.  Move to a regular diet as you feel able.  You may have a slight fever. Call the doctor if your fever is over 100 F (37.7 C) (taken under the tongue) or lasts longer than 24 hours.  You may have a dry mouth, a sore throat, muscle aches or trouble sleeping.  These should go away after 24 hours.  Do not make important or legal decisions.     Call your doctor for any of the followin.  Signs of infection (fever, growing tenderness at the surgery site, a large amount of drainage or bleeding, severe pain, foul-smelling drainage, redness, swelling).    2. It has been over 8 to 10 hours since surgery and you are still not able to urinate (pass water).    3.  Headache for over 24 hours.                 4. Signs of Covid-19 infection (temperature over 100 degrees, shortness of breath, cough, loss of taste/smell, generalized body aches, persistent headache,                  chills, sore throat, nausea/vomiting/diarrhea).       While you were at the hospital today you received 975 mg Tylenol at 6:15 am. Maximum daily dosage is 4000 mg.    Plastic Surgery Discharge Instructions    Patient has been treated for bilateral nipple areola reconstruction with Dr. Bowden on 2023.     Care: Please keep the  dressing in place, clean and dry.  You can remove the Tegaderm dressing on bilateral breasts after 2-3 days.  Once done, please apply antibiotic ointment to the nipples and place a larger Band-Aid on each side.  The Steri-Strips can fall off on their own.  If needed, the Band-Aids can be replaced.  You can shower 36-48 hours after surgery.    Medications: You can take Ibuprofen 400-800 mg and Tylenol 650 mg for pain relief. Please take each every 6 hours, and for optimal pain relief - please stagger the medications so that you are taking one or the other every 3 hours. If you had a nerve block, the effects may last 8-12 hours. If you have been prescribed additional pain medications, please take as instructed and as needed. If you are taking additional pain medications, please do not exceed 4000 mg of Tylenol daily from all sources. Also, if you are taking narcotic medications, please do not operate heavy machinery or drive. If you have been prescribed antibiotics, please also take as instructed.     Diet: Start with clear liquids and slow down if nauseated. Advance the diet as tolerated.    Weight-bearing/Activities: No strenuous activities and do not raise your heart rate above 100 bpm for the first few weeks. You can limit your weight-bearing to 10-15 pounds for the first few weeks, then we will slowly advance your restrictions.     ER Instructions: Please call the office and/or consider return to the ER if you experience worsening pain not relieved by medications, increased swelling, redness or high fevers >101F or if there are unexpected problems like shortness of breath.    Post-op follow-up: Clinic in 7-10 days with Dr. Bowden or PA at the [facility] [place] [facility]    Quinn Bowden MD, PhD

## 2023-04-19 NOTE — ANESTHESIA PREPROCEDURE EVALUATION
Anesthesia Pre-Procedure Evaluation    Patient: Demetri Mosley   MRN: [id number] : 1980        Procedure : Procedure(s):  BILATERAL NIPPLE AREOLAR RECONSTRUCTION, BILATERAL RECONSTRUCTED BREAST UPPER POLE FAT GRAFTING WITH FAT  SURGICAL PROCUREMENT, FAT GRAFT  REVISION, SCAR, TORSO, POSSIBLE RIGHT LOWER ABDOMINAL DOG-EAR EXCISION  LIPOSUCTIONED FROM ABDOMEN AND/OR FLANKS          Past Medical History:   Diagnosis Date     Allergic rhinitis due to animal dander      Anemia      Arthritis     rheumatoid     Chronic abdominal pain      Cyclic vomiting syndrome      Diabetes (H)     type 2     Diagnostic skin and sensitization tests (aka ALLERGENS) 2/19/15 IgE tests pos. for: cat/dog/DM/T/G/RW, NEG  for fish and shellfish.     DVT (deep venous thrombosis) (H) 2005    s/p long hospitalization - anticoagulated x 6 months     Endometriosis 2009    total hysterectomy     Gastro-oesophageal reflux disease      House dust mite allergy     2/19/15 IgE tests pos. for: cat/dog/DM/T/G/RW, NEG  for fish and shellfish.     Migraines      parotid mass 2012    benign, left, removed     PONV (postoperative nausea and vomiting)      Pyelonephritis due to Escherichia coli 05/15/2016     Uncomplicated asthma       Past Surgical History:   Procedure Laterality Date     CHOLECYSTECTOMY       COLONOSCOPY N/A 2016    Procedure: COLONOSCOPY;  Surgeon: Edvin Summers MD;  Location:  GI     ENT SURGERY      T&A     ESOPHAGOSCOPY, GASTROSCOPY, DUODENOSCOPY (EGD), COMBINED N/A 2016    Procedure: COMBINED ESOPHAGOSCOPY, GASTROSCOPY, DUODENOSCOPY (EGD), BIOPSY SINGLE OR MULTIPLE;  Surgeon: Edvin Summers MD;  Location:  GI     ESOPHAGOSCOPY, GASTROSCOPY, DUODENOSCOPY (EGD), COMBINED N/A 2022    Procedure: ESOPHAGOGASTRODUODENOSCOPY, WITH BIOPSY;  Surgeon: Tejas Orourke DO;  Location: [facility] [place]     ESOPHAGOSCOPY, GASTROSCOPY, DUODENOSCOPY (EGD), DILATATION, COMBINED N/A 2017     Procedure: COMBINED ESOPHAGOSCOPY, GASTROSCOPY, DUODENOSCOPY (EGD), DILATATION;  Surgeon: Corry Franco MD;  Location: [facility] OR     GI SURGERY      ERCP     GYN SURGERY      hysterectomy     HC CAPSULE ENDOSCOPY N/A 9/13/2016    Procedure: CAPSULE/PILL CAM ENDOSCOPY;  Surgeon: Iraj Choe MD;  Location:  GI     HC ESOPH/GAS REFLUX TEST W NASAL IMPED >1 HR N/A 2/17/2016    Procedure: ESOPHAGEAL IMPEDENCE FUNCTION TEST WITH 24 HOUR PH GREATER THAN 1 HOUR;  Surgeon: Charan Ozuna MD;  Location:  GI     HC UGI ENDOSCOPY W EUS  5/30/2013    Procedure: COMBINED ENDOSCOPIC ULTRASOUND, ESOPHAGOSCOPY, GASTROSCOPY, DUODENOSCOPY (EGD);  Surgeon: Juan Yu MD;  [facility]: [facility]     HEAD & NECK SURGERY      tumor removed     HYSTERECTOMY, PAP NO LONGER INDICATED       INJECT BLOCK MEDIAL BRANCH CERVICAL/THORACIC/LUMBAR Bilateral 3/25/2015    Procedure: INJECT BLOCK MEDIAL BRANCH CERVICAL / THORACIC / LUMBAR;  Surgeon: Malvin Rivera MD;  Location: [facility] OR     INSERT PORT VASCULAR ACCESS N/A 9/12/2016    Procedure: INSERT PORT VASCULAR ACCESS;  Surgeon: Juani Casiano MD;  Location: [facility]     LAPAROSCOPIC NISSEN FUNDOPLICATION N/A 12/23/2016    Procedure: LAPAROSCOPIC NISSEN FUNDOPLICATION;  Surgeon: Noah Brito MD;  [facility]     ORTHOPEDIC SURGERY       RESECT FIRST RIB Left 5/1/2015    Procedure: RESECT FIRST RIB;  Surgeon: Tien Yang MD;  Location: [facility] OR     REVISE RECONSTRUCTED BREAST Bilateral 12/14/2022    Procedure: BILATERAL RECONSTRUCTED BREAST REVISION,;  Surgeon: Quinn Bowden MD;  Location: [facility] OR     REVISE SCAR TRUNK N/A 12/14/2022    Procedure: ABDOMINAL SCAR REVISION;  Surgeon: Quinn Bowden MD;  Location: [facility]      Allergies   Allergen Reactions     Propranolol Shortness Of Breath            Compazine Hives     Severe muscle cramping and bad anxiety     Fluoxetine Other (See Comments)     Seizures     Gabapentin Swelling     Swelling of  "hands and feet     Lyrica Swelling     Metoclopramide Other (See Comments)     Very irritable.  Very irritable.     Metronidazole      Other reaction(s): very irritable     Pregabalin Swelling     Prochlorperazine Other (See Comments)     Severe muscle cramping and bad anxiety     Reglan Other (See Comments)     Very irritable.     Vancomycin Other (See Comments)     Able to tolerate with pre-medication (diphenhydramine) and slow rate.  \"crissy Syndrome\"     Wheat Diarrhea     Wheat Bran GI Disturbance     Zolpidem      Other reaction(s): sleep walking     Adhesive Tape Rash     Skin irritation     Contrast Dye Rash     Ct contrast dye  Rash over entire trunk     Diatrizoate Rash     CT contrast  Ct contrast dye  Rash over entire trunk     Dye Fdc Red [Red Dye] Rash     FD&C RED #40     Latex Hives and Rash     Liquid Adhesive Rash     Other [No Clinical Screening - See Comments] Rash     Diatrizoate meglumine     Penicillins Rash      Social History     Tobacco Use     Smoking status: Former     Packs/day: 0.25     Years: 5.00     Pack years: 1.25     Types: Cigarettes     Quit date: 2010     Years since quittin.8     Passive exposure: Current     Smokeless tobacco: Never   Vaping Use     Vaping status: Not on file   Substance Use Topics     Alcohol use: Yes     Comment: social - not that often.  Maybe one glass/week.      Wt Readings from Last 1 Encounters:   23 56.8 kg (125 lb 2 oz)        Anesthesia Evaluation   Pt has had prior anesthetic. Type: General.    History of anesthetic complications  - PONV.      ROS/MED HX  ENT/Pulmonary:     (+) asthma     Neurologic:     (+) migraines,     Cardiovascular:     (+) hypertension-----    METS/Exercise Tolerance:     Hematologic:     (+) History of blood clots,     Musculoskeletal:   (+) arthritis,     GI/Hepatic: Comment: S/P Nissen Fundoplication.  Cyclic vomiting disorder    (+) GERD, Asymptomatic on medication,     Renal/Genitourinary:  - neg Renal " ROS     Endo:  - neg endo ROS     Psychiatric/Substance Use:  - neg psychiatric ROS     Infectious Disease:  - neg infectious disease ROS     Malignancy:  - neg malignancy ROS     Other:  - neg other ROS    (+) , H/O Chronic Pain,        Physical Exam    Airway  airway exam normal           Respiratory Devices and Support         Dental       (+) Completely normal teeth      Cardiovascular   cardiovascular exam normal       Rhythm and rate: regular and normal     Pulmonary   pulmonary exam normal        breath sounds clear to auscultation           OUTSIDE LABS:  CBC:   Lab Results   Component Value Date    WBC 7.3 12/21/2016    WBC 4.1 11/10/2016    HGB 14.3 12/23/2016    HGB 14.3 12/21/2016    HCT 43.4 12/21/2016    HCT 40.9 11/10/2016     12/23/2016     12/21/2016     BMP:   Lab Results   Component Value Date     12/21/2016     11/09/2016    POTASSIUM 3.7 12/21/2016    POTASSIUM 3.8 11/09/2016    CHLORIDE 104 12/21/2016    CHLORIDE 108 11/09/2016    CO2 25 12/21/2016    CO2 24 11/09/2016    BUN 13 12/21/2016    BUN 9 11/09/2016    CR 0.83 12/23/2016    CR 0.79 12/21/2016    GLC 85 12/14/2022    GLC 83 12/21/2016     COAGS:   Lab Results   Component Value Date    PTT 25 09/12/2016    INR 1.13 09/12/2016     POC:   Lab Results   Component Value Date    HCG Negative 05/31/2016     HEPATIC:   Lab Results   Component Value Date    ALBUMIN 4.0 05/22/2019    PROTTOTAL 6.9 11/09/2016    ALT 26 11/09/2016    AST 20 11/09/2016    ALKPHOS 60 11/09/2016    BILITOTAL 0.8 11/09/2016     OTHER:   Lab Results   Component Value Date    LACT 0.3 (L) 08/13/2016    A1C 5.2 05/01/2015    OLE 9.3 12/21/2016    PHOS 3.6 05/25/2016    MAG 1.8 05/16/2016    LIPASE 114 08/12/2016    TSH 0.60 05/15/2016    CRP 4.8 08/13/2016    SED 8 06/24/2016       Anesthesia Plan    ASA Status:  2   NPO Status:  NPO Appropriate    Anesthesia Type: General.     - Airway: LMA   Induction: Intravenous, Propofol.   Maintenance:  Balanced.        Consents    Anesthesia Plan(s) and associated risks, benefits, and realistic alternatives discussed. Questions answered and patient/representative(s) expressed understanding.    - Discussed:     - Discussed with:  Patient    Use of blood products discussed: No .     Postoperative Care    Pain management: Multi-modal analgesia, Oral pain medications.   PONV prophylaxis: Ondansetron (or other 5HT-3), Dexamethasone or Solumedrol     Comments:                Gustavo Brown, DO

## 2023-04-19 NOTE — ANESTHESIA PROCEDURE NOTES
Airway       Patient location during procedure: OR  Staff -        CRNA: Chanelle Marmolejo APRN CRNA       Performed By: CRNA  Consent for Airway        Urgency: elective  Indications and Patient Condition       Indications for airway management: adair-procedural       Induction type:intravenous       Mask difficulty assessment: 1 - vent by mask    Final Airway Details       Final airway type: supraglottic airway    Supraglottic Airway Details        Type: LMA       Brand: LMA Unique       LMA size: 4    Post intubation assessment        Placement verified by: capnometry, equal breath sounds and chest rise        Number of attempts at approach: 1       Secured with: silk tape       Ease of procedure: easy       Dentition: Intact and Unchanged

## 2023-04-19 NOTE — OP NOTE
PLASTIC SURGERY OPERATIVE REPORT     Date of Surgery: 4/19/2023  Surgical Service: Plastic Surgery     Preoperative Diagnosis:   1.  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  History of breast cancer and status post autologous abdominally-based free flap breast reconstruction     Postoperative Diagnosis: Same as preoperative diagnoses     Procedures Performed:  1.  Bilateral nipple areolar reconstruction with use of modified C-V flaps  2.  Bilateral reconstructed breast fat grafting from the abdomen  3.  Right lower abdominal scar revision with dogear excision and complex closure (8-cm)     Attending:  ZACH Bowden MD, PhD  Assistant: THERESE Bryant PA-C (no resident available)     Complications: None apparent  Specimens: None  Implants: None  Estimated blood loss: 1 mL  Wound classification: Clean  Anesthesia: General     Indications for Procedure: 43-year-old female status post bilateral autologous breast reconstruction following mastectomies presenting with acquired absence of bilateral breast and nipple areola, presenting for bilateral nipple areolar reconstruction, fat grafting from the abdomen to bilateral breast, and right lower abdominal scar revision with dogear excision and complex closure. Discussed the risks of surgery, including but not limited to: infection, bleeding, pain, poor scarring, wound healing issues, asymmetries, need for revision surgery, suboptimal aesthetic result. Despite these risks, patient consents to surgery.      Intraoperative findings: Viable bilateral modified C-V flap nipple areolar reconstructions.  Small volume (10 mL) of fat taken from lower abdomen bilaterally and transferred to the bilateral breasts.  Small dogear excision done of the lower right abdominal scar.     Description of Procedure: Patient was seen in the preoperative holding area.  Consent was verified.  The bilateral breasts, including nipple areola, and abdomen were marked.  All additional questions  were answered. Discussed the risks of surgery, including but not limited to: infection, bleeding, pain, poor scarring, wound healing issues, asymmetries, need for revision surgery, suboptimal aesthetic result. Despite these risks, patient consents to surgery.  Patient was then transferred to the operating room and placed supine on the operating table.  All pressure points were padded.  Sequential compression devices were placed on bilateral lower extremities and verified to be operational.  IV antibiotic prophylaxis was given.  Preinduction timeout was performed.  General anesthesia care was commenced.    The bilateral breasts, abdomen and flanks were prepped and draped in usual sterile fashion.  At the start of the case, 0.5% lidocaine containing 1: 200,000 epinephrine was infiltrated into the bilateral lower abdomen and flanks.  Next, the nipple areolar reconstructions were done bilaterally.  The same procedure was done on both sides.  Using the preoperatively marked nipple areolar position, a modified C-V flap design was done with 1.5 cm limbs and a 1.5 cm base providing perfusion.  The space was oriented cephalically.  Once through skin using a #15 blade, the flap was raised thick with subcutaneous tissue.  Hemostasis was obtained using monopolar electrocautery.  Next, several 4-0 PDS deep dermal sutures were placed, followed by 3-0 Monocryl deep dermal suture.  The limbs were closed with 4-0 chromic simple interrupted and running suture.  The flaps were reapproximated to each other to create a nipple papule and closed with 4-0 chromic simple interrupted suture.  As mentioned, the same was done on both sides.  Next, an elliptical excision was done of the preoperatively marked dogear of the right lower abdomen.  Once through skin using a #15 blade, monopolar cautery was used to excise both skin and some subcutaneous tissue.  Hemostasis was obtained using monopolar electrocautery.  The closure was done with 2-0  Vicryl deep dermal and 3-0 Monocryl running intracuticular suture.  Next, using syringe and help liposuction, fat was harvested via several stab incisions within prior scars.  Once done, a total of approximately 10-15 cc of lipoaspirate was obtained.  The blood and oil was decanted.  Next, the fat was hand-held injected into the preoperatively marked areas of depression in the bilateral reconstructed breast upper poles.  The fat was gently massaged to help prevent clumping and contour irregularity.  All liposuction harvest and stab incisions were closed with 5-0 interrupted horizontal mattress suture.  The stab incisions were dressed with bacitracin and Band-Aids.  The right lower abdominal scar revision was dressed with Steri-Strips.  The limbs of the nipple areolar reconstruction was dressed with Steri-Strips.  The nipple reconstruction was dressed with bacitracin, Xeroform, 4 x 4 gauze and a Tegaderm was placed.  An abdominal binder was placed on the abdomen.  Patient then was woken up with no issues and transferred to the postanesthesia care unit with no events.     Postoperative plan:  Return to clinic in 1 week for reevaluation.     Quinn Bowden MD, PhD

## 2023-04-24 ENCOUNTER — MYC MEDICAL ADVICE (OUTPATIENT)
Dept: SURGERY | Facility: CLINIC | Age: 43
End: 2023-04-24
Payer: COMMERCIAL

## 2023-04-25 ENCOUNTER — NURSE TRIAGE (OUTPATIENT)
Dept: NURSING | Facility: CLINIC | Age: 43
End: 2023-04-25
Payer: COMMERCIAL

## 2023-04-25 NOTE — TELEPHONE ENCOUNTER
Spoke with pt -see nurse triage note from today 4/25. Consulted with Yasmine GUILLERMO in clinic and she advised pt be seen tomorrow or Friday. Pt preferred appt on Friday 4/28  in . Pt scheduled and advised to call back if symptoms worsen prior to this appt on Friday 4/28...Mirian Uribe RN

## 2023-04-25 NOTE — TELEPHONE ENCOUNTER
Spoke with Yasmine HATCH regarding pt's symptoms and the triage note. Yasmine advised adding pt to Dr. Bowden's clinic tomorrow at the [facility] [place] or on Friday in . RN reached out to patient with these options. Pt states that tomorrow she has meetings and doesn't think it will work to come in tomorrow. Pt states she would prefer to come in on Friday. Pt scheduled and advised if symptoms worsen, redness spreading, thick yellow,green drainage, fever chills to call RN back at [phone]...Mirian Uribe RN

## 2023-04-25 NOTE — TELEPHONE ENCOUNTER
RN noted triage note below. Routing to Yasmine GUILLERMO, who is in  Plastics today but has no openings,  to review as no other plastic providers in clinic today. Thank you..Mirian Uribe RN

## 2023-04-25 NOTE — TELEPHONE ENCOUNTER
BILATERAL NIPPLE AREOLAR RECONSTRUCTION, BILATERAL RECONSTRUCTED BREAST UPPER POLE FAT GRAFTING WITH FAT Bilateral General   SURGICAL PROCUREMENT, FAT GRAFT Bilateral General   REVISION, SCAR, TORSO, POSSIBLE RIGHT LOWER ABDOMINAL DOG-EAR EXCISION Right General   LIPOSUCTIONED FROM ABDOMEN AND/OR FLANKS       Quinn Bowden MD on 04-      Patient is calling is concerned about possible infection.  She completed her short course of Antibiotics.  She is having chills last night, took Tylenol and feels better.  She does not have a thermometer to take her temp with.  States she does not think she has a fever.  She is concerned with yellow drainage from incision and 2 new raised areas on the left medial and increased redness.  This is also her Port side and she is concerned about infection and not wanting to loose her port as this is used monthly. She does not have any pain as she is numb.  She did send a my chart message along with a photo.    She is willing to come in if she needs to or review photo in my chart.   She is eating and drinking fine.  Ph[facility]   [facility]    RN to route to Plastics clinic  Patient will keep phone handy  Patient agrees with plan.      Reason for Disposition    Patient wants to be seen    Surgical incision symptoms and questions    Incision looks infected (spreading redness, pain)    Additional Information    Negative: Sounds like a life-threatening emergency to the triager    Negative: Bright red, wide-spread, sunburn-like rash    Negative: SEVERE headache and after spinal (epidural) anesthesia    Negative: Vomiting and persists > 4 hours    Negative: Vomiting and abdomen looks much more swollen than usual    Negative: Drinking very little and dehydration suspected (e.g., no urine > 12 hours, very dry mouth, very lightheaded)    Negative: Patient sounds very sick or weak to the triager    Negative: Sounds like a serious complication to the triager    Negative: Fever > 100.4 F  (38.0 C)    Negative: Fever present > 3 days (72 hours)    Negative: SEVERE post-op pain (e.g., excruciating, pain scale 8-10) that is not controlled with pain medications    Negative: Headache and after spinal (epidural) anesthesia and not severe    Negative: Caller has URGENT question and triager unable to answer question    Negative: Major abdominal surgical incision and wound gaping open with visible internal organs    Negative: Sounds like a life-threatening emergency to the triager    Negative: Bleeding from incision and won't stop after 10 minutes of direct pressure    Negative: Bleeding (more than a few drops) from incision and after blood vessel surgery (e.g., carotidendarterectomy, femoral bypass graft, kidney dialysis fistula, tracheostomy)    Negative: Bright red, wide-spread, sunburn-like rash    Negative: SEVERE pain in the incision    Negative: Incision gaping open and < 2 days (48 hours) since wound re-opened    Negative: Incision gaping open and length of opening > 2 inches (5 cm)    Negative: Patient sounds very sick or weak to the triager    Negative: Sounds like a serious complication to the triager    Negative: Fever > 100.4 F (38.0 C)    Negative: Red streak runs from the incision and longer than 1 inch (2.5 cm)    Negative: Pus or bad-smelling fluid draining from incision    Negative: Raised bruise and size > 2 inches (5 cm) and expanding    Negative: Scant bleeding (e.g., few drops) from incision and after blood vessel surgery (e.g., carotid endarterectomy, femoral bypass graft, kidney dialysis fistula    Negative: Caller has URGENT question and triager unable to answer question    Negative: Incision gaping open and length of opening > 1/4 inch (6 mm) and on the face and over 2 days since wound re-opened    Negative: Incision gaping open and length of opening > 1/2 inch (1 cm) and over 2 days since wound re-opened    Negative: Clear or blood-tinged fluid draining from wound and no fever     "Negative: Suture or staple removal is overdue    Answer Assessment - Initial Assessment Questions  1. SYMPTOM: \"What's the main symptom you're concerned about?\" (e.g., pain, fever, vomiting)      Chills last evening, small amount of yellow drainage from incision liine and 2 area raised that are new.  She sent a my chart photo yesterday  2. ONSET: \"When did   start?\"      yesterday  3. SURGERY: \"What surgery did you have?\"     4. DATE of SURGERY: \"When was the surgery?\"    04-  5. ANESTHESIA: \" What type of anesthesia did you have?\" (e.g., general, spinal, epidural, local)     general  6. PAIN: \"Is there any pain?\" If Yes, ask: \"How bad is it?\"  (Scale 1-10; or mild, moderate, severe)     No pain she is numb  7. FEVER: \"Do you have a fever?\" If Yes, ask: \"What is your temperature, how was it measured, and when did it start?\"      Does not think so, unable to find her thermometer  ++ chills  8. VOMITING: \"Is there any vomiting?\" If Yes, ask: \"How many times?\"      no  9. BLEEDING: \"Is there any bleeding?\" If Yes, ask: \"How much?\" and \"Where?\"      no  10. OTHER SYMPTOMS: \"Do you have any other symptoms?\" (e.g., drainage from wound, painful urination, constipation)        Taking Tylenol and feels better.  This Left side is also her port side, so she is concerned about an infection and loosing her port. No oder.    Protocols used: POST-OP SYMPTOMS AND [id number], POST-OP INCISION SYMPTOMS AND [id number]      "

## 2023-04-28 ENCOUNTER — OFFICE VISIT (OUTPATIENT)
Dept: SURGERY | Facility: CLINIC | Age: 43
End: 2023-04-28
Payer: COMMERCIAL

## 2023-04-28 DIAGNOSIS — Z90.13 ABSENCE OF BOTH BREASTS: Primary | ICD-10-CM

## 2023-04-28 PROCEDURE — 99024 POSTOP FOLLOW-UP VISIT: CPT | Performed by: STUDENT IN AN ORGANIZED HEALTH CARE EDUCATION/TRAINING PROGRAM

## 2023-04-28 NOTE — PROGRESS NOTES
PRS    No issues.  Removed dressing.    On exam, all incisions intact and nipple areolar reconstruction viable.  Very good symmetry between sides.  Abdominal incisions intact with no contour irregularity.    A/P: 43-year-old female 1 week status post bilateral nipple areolar reconstruction, bilateral reconstructed breast upper pole fat grafting    -Continue Aquaphor to the nipple areolar reconstruction twice daily for 2 weeks  -At 3-4 weeks postoperatively, patient can initiate scar treatment with bio oil  -No nipple tattooing until greater than 3 months postoperatively  -Limit lifting to 10-15 pounds  -No soaking or swimming until 6 weeks postoperatively  -Return to clinic in 5 weeks    Quinn Bowden MD, PhD

## 2023-04-28 NOTE — NURSING NOTE
"Demetri Mosley's chief complaint for this visit includes:    Chief Complaint   Patient presents with     Surgical Followup     Left breast incicion redness/drainage. Right breast incision is   irritated       PCP: Nikolay Baeza         Referring Provider:    No referring provider defined for this encounter.         [facility] 11/21/2001     Data Unavailable            Allergies   Allergen Reactions     Propranolol Shortness Of Breath            Compazine Hives     Severe muscle cramping and bad anxiety     Fluoxetine Other (See Comments)     Seizures     Gabapentin Swelling     Swelling of hands and feet     Metoclopramide Other (See Comments)     Very irritable.  Very irritable.     Metoclopramide Hcl Other (See Comments)     Very irritable.     Metronidazole      Other reaction(s): very irritable     Pregabalin Swelling     Pregabalin Swelling     Prochlorperazine Other (See Comments)     Severe muscle cramping and bad anxiety     Vancomycin Other (See Comments)     Able to tolerate with pre-medication (diphenhydramine) and slow rate.  \"crissy Syndrome\"     Wheat Diarrhea     Wheat Bran GI Disturbance     Zolpidem      Other reaction(s): sleep walking     Adhesive Tape Rash     Skin irritation     Contrast Dye Rash     Ct contrast dye  Rash over entire trunk     Diatrizoate Rash     CT contrast  Ct contrast dye  Rash over entire trunk     Dye Fdc Red [Red Dye] Rash     FD&C RED #40     Latex Hives and Rash     Liquid Adhesive Rash     Other [No Clinical Screening - See Comments] Rash     Diatrizoate meglumine     Penicillins Rash                 Do you need any medication refills at today's visit? No    Yasmine Moseley LPN on 4/28/2023 at 10:15 AM    "

## 2023-04-28 NOTE — LETTER
4/28/2023         RE: Demetri Mosley  [address] [facility] [address]        Dear Colleague,    Thank you for referring your patient, Demetri Mosley, to the [facility]. Please see a copy of my visit note below.    PRS    No issues.  Removed dressing.    On exam, all incisions intact and nipple areolar reconstruction viable.  Very good symmetry between sides.  Abdominal incisions intact with no contour irregularity.    A/P: 43-year-old female 1 week status post bilateral nipple areolar reconstruction, bilateral reconstructed breast upper pole fat grafting    -Continue Aquaphor to the nipple areolar reconstruction twice daily for 2 weeks  -At 3-4 weeks postoperatively, patient can initiate scar treatment with bio oil  -No nipple tattooing until greater than 3 months postoperatively  -Limit lifting to 10-15 pounds  -No soaking or swimming until 6 weeks postoperatively  -Return to clinic in 5 weeks    Quinn Bowden MD, PhD      Again, thank you for allowing me to participate in the care of your patient.        Sincerely,        Quinn Bowden MD

## 2023-05-26 ENCOUNTER — OFFICE VISIT (OUTPATIENT)
Dept: SURGERY | Facility: CLINIC | Age: 43
End: 2023-05-26
Payer: COMMERCIAL

## 2023-05-26 DIAGNOSIS — Z90.13 ABSENCE OF BOTH BREASTS: Primary | ICD-10-CM

## 2023-05-26 PROCEDURE — 99024 POSTOP FOLLOW-UP VISIT: CPT | Performed by: STUDENT IN AN ORGANIZED HEALTH CARE EDUCATION/TRAINING PROGRAM

## 2023-05-26 NOTE — LETTER
5/26/2023         RE: Demetri Mosley  [address] [facility] [address]        Dear Colleague,    Thank you for referring your patient, Demetri Mosley, to the [facility]. Please see a copy of my visit note below.    PRS    Doing well.  Had some spit sutures, but all of been removed.  Very happy with result.    On exam, well-healed and remodeling scars that are still expectantly immature with reasonable symmetry.    A/P: 43-year-old 5 weeks status post bilateral nipple areolar reconstruction with upper pole fat grafting    -Instructed patient to continue scar treatment with silicone-based ointment versus bio oil.  Explained that scars remodel over period of 12-18 months.  My recommendation would be to consider tattooing after the scars are more mature, and if not pressing to have this done then maybe after 12 months.  -Photography today  -Return to clinic as needed    Quinn Bowden MD, PhD      Again, thank you for allowing me to participate in the care of your patient.        Sincerely,        Quinn Bowden MD

## 2023-05-26 NOTE — NURSING NOTE
"Demetri Mosley's chief complaint for this visit includes:  Chief Complaint   Patient presents with     RECHECK     Post-op - DOS 4/19 - Breast reconstruction, abdominal scar revision   stay with Dr. Bowden per case request notes     PCP: Nikolay Baeza    Referring Provider:  No referring provider defined for this encounter.    LMP 11/21/2001   Data Unavailable        Allergies   Allergen Reactions     Propranolol Shortness Of Breath            Compazine Hives     Severe muscle cramping and bad anxiety     Fluoxetine Other (See Comments)     Seizures     Gabapentin Swelling     Swelling of hands and feet     Metoclopramide Other (See Comments)     Very irritable.  Very irritable.     Metoclopramide Hcl Other (See Comments)     Very irritable.     Metronidazole      Other reaction(s): very irritable     Pregabalin Swelling     Pregabalin Swelling     Prochlorperazine Other (See Comments)     Severe muscle cramping and bad anxiety     Vancomycin Other (See Comments)     Able to tolerate with pre-medication (diphenhydramine) and slow rate.  \"crissy Syndrome\"     Wheat Diarrhea     Wheat Bran GI Disturbance     Zolpidem      Other reaction(s): sleep walking     Adhesive Tape Rash     Skin irritation     Contrast Dye Rash     Ct contrast dye  Rash over entire trunk     Diatrizoate Rash     CT contrast  Ct contrast dye  Rash over entire trunk     Dye Fdc Red [Red Dye] Rash     FD&C RED #40     Latex Hives and Rash     Liquid Adhesive Rash     Other [No Clinical Screening - See Comments] Rash     Diatrizoate meglumine     Penicillins Rash         Do you need any medication refills at today's visit? Queta escalona [facility] Visit Facilitator- [facility]       "

## 2023-05-26 NOTE — PROGRESS NOTES
PRS    Doing well.  Had some spit sutures, but all of been removed.  Very happy with result.    On exam, well-healed and remodeling scars that are still expectantly immature with reasonable symmetry.    A/P: 43-year-old 5 weeks status post bilateral nipple areolar reconstruction with upper pole fat grafting    -Instructed patient to continue scar treatment with silicone-based ointment versus bio oil.  Explained that scars remodel over period of 12-18 months.  My recommendation would be to consider tattooing after the scars are more mature, and if not pressing to have this done then maybe after 12 months.  -Photography today  -Return to clinic as needed    Quinn Bowedn MD, PhD

## 2023-11-09 ENCOUNTER — NURSE TRIAGE (OUTPATIENT)
Dept: NURSING | Facility: CLINIC | Age: 43
End: 2023-11-09
Payer: COMMERCIAL

## 2023-11-10 ENCOUNTER — TELEPHONE (OUTPATIENT)
Dept: FAMILY MEDICINE | Facility: CLINIC | Age: 43
End: 2023-11-10

## 2023-11-10 NOTE — TELEPHONE ENCOUNTER
Rph evaluated drug interactions with paxlovid, Patient uses opzelura topical, up to date has this listed as X rated, she was ok with not using the opzelura for 10 days, (this did not show up on her med list in EPIC, so she didn't discuss the interaction during her visit) we are dispensing the paxlovid as prescribed, Please confirm you are ok with this advice  Thank you  Debra Maloney Rph  [facility]  [address] [place] [address]  [place] [address]  [phone]

## 2023-11-10 NOTE — TELEPHONE ENCOUNTER
Per pt she tested positive for Covid and is interested in Covid treatment options. Per pt, she doesn't have primary care established with [place] and wants to be scheduled to see a provider via E-Visit. Pt is offered and declined FNA nurse triage for Covid and symptom management. Pt is informed that she needs to be seen via a virtual visit and is warm transferred to [place] at the Covid [facility] line to help pt to schedule a virtual appointment. Pt verbalized understanding and no other needs at this time.    Reason for Disposition    General information question, no triage required and triager able to answer question    Protocols used: Information Only Call - No Triage-A-

## 2024-02-05 ENCOUNTER — OFFICE VISIT (OUTPATIENT)
Dept: FAMILY MEDICINE | Facility: CLINIC | Age: 44
End: 2024-02-05
Payer: COMMERCIAL

## 2024-02-05 VITALS
TEMPERATURE: 97.1 F | OXYGEN SATURATION: 98 % | DIASTOLIC BLOOD PRESSURE: 80 MMHG | WEIGHT: 128 LBS | HEIGHT: 61 IN | SYSTOLIC BLOOD PRESSURE: 124 MMHG | RESPIRATION RATE: 16 BRPM | HEART RATE: 85 BPM | BODY MASS INDEX: 24.17 KG/M2

## 2024-02-05 DIAGNOSIS — J01.00 ACUTE NON-RECURRENT MAXILLARY SINUSITIS: Primary | ICD-10-CM

## 2024-02-05 PROCEDURE — 99213 OFFICE O/P EST LOW 20 MIN: CPT | Performed by: PHYSICIAN ASSISTANT

## 2024-02-05 RX ORDER — DOXYCYCLINE HYCLATE 100 MG
100 TABLET ORAL 2 TIMES DAILY
Qty: 14 TABLET | Refills: 0 | Status: SHIPPED | OUTPATIENT
Start: 2024-02-05 | End: 2024-02-12

## 2024-02-05 ASSESSMENT — ASTHMA QUESTIONNAIRES
ACT_TOTALSCORE: 21
QUESTION_5 LAST FOUR WEEKS HOW WOULD YOU RATE YOUR ASTHMA CONTROL: WELL CONTROLLED
QUESTION_4 LAST FOUR WEEKS HOW OFTEN HAVE YOU USED YOUR RESCUE INHALER OR NEBULIZER MEDICATION (SUCH AS ALBUTEROL): TWO OR THREE TIMES PER WEEK
QUESTION_1 LAST FOUR WEEKS HOW MUCH OF THE TIME DID YOUR ASTHMA KEEP YOU FROM GETTING AS MUCH DONE AT WORK, SCHOOL OR AT HOME: NONE OF THE TIME
QUESTION_2 LAST FOUR WEEKS HOW OFTEN HAVE YOU HAD SHORTNESS OF BREATH: ONCE OR TWICE A WEEK
ACT_TOTALSCORE: 21
QUESTION_3 LAST FOUR WEEKS HOW OFTEN DID YOUR ASTHMA SYMPTOMS (WHEEZING, COUGHING, SHORTNESS OF BREATH, CHEST TIGHTNESS OR PAIN) WAKE YOU UP AT NIGHT OR EARLIER THAN USUAL IN THE MORNING: NOT AT ALL

## 2024-02-05 ASSESSMENT — PAIN SCALES - GENERAL: PAINLEVEL: NO PAIN (0)

## 2024-02-05 NOTE — PROGRESS NOTES
"    Assessment & Plan     Acute non-recurrent maxillary sinusitis  Ongoing 1 month  - doxycycline hyclate (VIBRA-TABS) 100 MG tablet; Take 1 tablet (100 mg) by mouth 2 times daily for 7 days  expected course of disease discussed with patient.  Side effects of medications reviewed    Red dye allergy flag for hives although  dependent. Has taken before in past of different color and been ok.        Follow up if not improving or if worsening 2 weeks       Subjective   Demetri is a 43 year old, presenting for the following health issues:  Sinus Problem        2/5/2024     6:52 AM   Additional Questions   Roomed by Michelle Martinez MA   Accompanied by Self     History of Present Illness       Reason for visit:  Sinus infection  Symptom onset:  3-4 weeks ago    She eats 2-3 servings of fruits and vegetables daily.She consumes 0 sweetened beverage(s) daily.She exercises with enough effort to increase her heart rate 30 to 60 minutes per day.  She exercises with enough effort to increase her heart rate 3 or less days per week.   She is taking medications regularly.         Review of Systems  Constitutional, neuro, ENT, endocrine, pulmonary, cardiac, gastrointestinal, genitourinary, musculoskeletal, integument and psychiatric systems are negative, except as otherwise noted.      Objective    /80   Pulse 85   Temp 97.1  F (36.2  C) (Tympanic)   Resp 16   Ht 1.549 m (5' 1\")   Wt 58.1 kg (128 lb)   LMP 11/21/2001   SpO2 98%   Breastfeeding No   BMI 24.19 kg/m    Body mass index is 24.19 kg/m .      Physical Exam   GENERAL: alert and no distress  EYES: Eyes grossly normal to inspection, PERRL and conjunctivae and sclerae normal  HENT: ear canals and TM's normal, nose and mouth without ulcers or lesions. Maxillary sinus tenderness greater L than R   NECK: no adenopathy, no asymmetry, masses, or scars  RESP: lungs clear to auscultation - no rales, rhonchi or wheezes  CV: regular rate and rhythm, normal S1 S2, no " S3 or S4, no murmur, click or rub, no peripheral edema          Signed Electronically by: Jesus Alberto Juan PA-C

## 2024-04-06 ENCOUNTER — HOSPITAL ENCOUNTER (EMERGENCY)
Facility: CLINIC | Age: 44
Discharge: HOME | End: 2024-04-06
Attending: FAMILY MEDICINE | Admitting: FAMILY MEDICINE
Payer: COMMERCIAL

## 2024-04-06 VITALS
TEMPERATURE: 97.9 F | HEIGHT: 61 IN | SYSTOLIC BLOOD PRESSURE: 132 MMHG | DIASTOLIC BLOOD PRESSURE: 86 MMHG | WEIGHT: 124 LBS | HEART RATE: 93 BPM | RESPIRATION RATE: 18 BRPM | OXYGEN SATURATION: 100 % | BODY MASS INDEX: 23.41 KG/M2

## 2024-04-06 DIAGNOSIS — S01.21XA LACERATION OF NOSE, INITIAL ENCOUNTER: ICD-10-CM

## 2024-04-06 PROCEDURE — 12011 RPR F/E/E/N/L/M 2.5 CM/<: CPT | Performed by: FAMILY MEDICINE

## 2024-04-06 PROCEDURE — 99282 EMERGENCY DEPT VISIT SF MDM: CPT | Performed by: FAMILY MEDICINE

## 2024-04-06 PROCEDURE — 99283 EMERGENCY DEPT VISIT LOW MDM: CPT | Performed by: FAMILY MEDICINE

## 2024-04-06 PROCEDURE — 250N000013 HC RX MED GY IP 250 OP 250 PS 637: Performed by: FAMILY MEDICINE

## 2024-04-06 RX ORDER — IBUPROFEN 600 MG/1
600 TABLET, FILM COATED ORAL ONCE
Status: COMPLETED | OUTPATIENT
Start: 2024-04-06 | End: 2024-04-06

## 2024-04-06 RX ORDER — ACETAMINOPHEN 325 MG/1
975 TABLET ORAL ONCE
Status: COMPLETED | OUTPATIENT
Start: 2024-04-06 | End: 2024-04-06

## 2024-04-06 RX ADMIN — IBUPROFEN 600 MG: 600 TABLET ORAL at 20:41

## 2024-04-06 RX ADMIN — ACETAMINOPHEN 975 MG: 325 TABLET, FILM COATED ORAL at 20:41

## 2024-04-06 ASSESSMENT — COLUMBIA-SUICIDE SEVERITY RATING SCALE - C-SSRS
1. IN THE PAST MONTH, HAVE YOU WISHED YOU WERE DEAD OR WISHED YOU COULD GO TO SLEEP AND NOT WAKE UP?: NO
2. HAVE YOU ACTUALLY HAD ANY THOUGHTS OF KILLING YOURSELF IN THE PAST MONTH?: NO
6. HAVE YOU EVER DONE ANYTHING, STARTED TO DO ANYTHING, OR PREPARED TO DO ANYTHING TO END YOUR LIFE?: NO

## 2024-04-06 ASSESSMENT — ACTIVITIES OF DAILY LIVING (ADL): ADLS_ACUITY_SCORE: 35

## 2024-04-07 NOTE — ED NOTES
Patient sent to ER from  with nose laceration.  Patient was using grill and grill top fell down on nose.

## 2024-04-07 NOTE — ED TRIAGE NOTES
Laceration to bridge of the nose. Per pt the grill lid fell and hit pt on the nose. [place] was cold, no LOC, not on blood thinners. No active bleeding.      Triage Assessment (Adult)       Row Name 04/06/24 2035          Triage Assessment    Airway WDL WDL        Respiratory WDL    Respiratory WDL WDL        Skin Circulation/Temperature WDL    Skin Circulation/Temperature WDL WDL        Cardiac WDL    Cardiac WDL WDL        Peripheral/Neurovascular WDL    Peripheral Neurovascular WDL WDL        Cognitive/Neuro/Behavioral WDL    Cognitive/Neuro/Behavioral WDL WDL

## 2024-04-07 NOTE — DISCHARGE INSTRUCTIONS
RETURN TO THE EMERGENCY ROOM FOR THE FOLLOWING:    Severely worsened pain, expanding redness/swelling/tenderness, worsened pain and fever greater than 101, or at anytime for any concern.    FOLLOW UP:    With your primary clinic only as needed.    TREATMENT RECOMMENDATIONS:    Do not apply antibiotic ointment or creams.  The liquid adhesive will wear off over about 7 days.    NURSE ADVICE LINE:  [phone] or [phone]
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
## 2024-04-08 ENCOUNTER — OFFICE VISIT (OUTPATIENT)
Dept: FAMILY MEDICINE | Facility: CLINIC | Age: 44
End: 2024-04-08
Payer: COMMERCIAL

## 2024-04-08 ENCOUNTER — TELEPHONE (OUTPATIENT)
Dept: FAMILY MEDICINE | Facility: CLINIC | Age: 44
End: 2024-04-08

## 2024-04-08 VITALS
HEART RATE: 89 BPM | RESPIRATION RATE: 16 BRPM | OXYGEN SATURATION: 99 % | WEIGHT: 127.4 LBS | SYSTOLIC BLOOD PRESSURE: 126 MMHG | HEIGHT: 61 IN | TEMPERATURE: 97.7 F | DIASTOLIC BLOOD PRESSURE: 84 MMHG | BODY MASS INDEX: 24.05 KG/M2

## 2024-04-08 DIAGNOSIS — S01.21XD LACERATION OF NOSE, SUBSEQUENT ENCOUNTER: Primary | ICD-10-CM

## 2024-04-08 DIAGNOSIS — T36.95XA ANTIBIOTIC-INDUCED YEAST INFECTION: ICD-10-CM

## 2024-04-08 DIAGNOSIS — B37.9 ANTIBIOTIC-INDUCED YEAST INFECTION: ICD-10-CM

## 2024-04-08 PROCEDURE — 99213 OFFICE O/P EST LOW 20 MIN: CPT | Performed by: NURSE PRACTITIONER

## 2024-04-08 RX ORDER — DUPILUMAB 300 MG/2ML
INJECTION, SOLUTION SUBCUTANEOUS
COMMUNITY
Start: 2024-03-21

## 2024-04-08 RX ORDER — SULFAMETHOXAZOLE/TRIMETHOPRIM 800-160 MG
1 TABLET ORAL 2 TIMES DAILY
Qty: 20 TABLET | Refills: 0 | Status: SHIPPED | OUTPATIENT
Start: 2024-04-08 | End: 2024-04-18

## 2024-04-08 RX ORDER — FLUCONAZOLE 150 MG/1
TABLET ORAL
Qty: 3 TABLET | Refills: 0 | Status: SHIPPED | OUTPATIENT
Start: 2024-04-08

## 2024-04-08 RX ORDER — PEN NEEDLE, DIABETIC 32GX 5/32"
NEEDLE, DISPOSABLE MISCELLANEOUS
COMMUNITY
Start: 2023-12-29

## 2024-04-08 ASSESSMENT — PAIN SCALES - GENERAL: PAINLEVEL: NO PAIN (0)

## 2024-04-08 NOTE — TELEPHONE ENCOUNTER
Yes, patient is holding methotrexate for past week per her rheumatology team and will continue to hold during duration of antibiotic therapy.

## 2024-04-08 NOTE — PROGRESS NOTES
"  Assessment & Plan     Laceration of nose, subsequent encounter  -Patient report of increased swelling, yellow purulent discharge, on immunosuppressants. Last methotrexate dose a week ago, was instructed to hole methotrexate and other immunosuppression therapy for next week.   - sulfamethoxazole-trimethoprim (BACTRIM DS) 800-160 MG tablet; Take 1 tablet by mouth 2 times daily for 10 days  -return to clinic or urgent care if worsening swelling, discharge, or fever.    Antibiotic-induced yeast infection  -start therapy only if yeast infection symptoms develop.   - fluconazole (DIFLUCAN) 150 MG tablet; Take one tablet at onset of yeast infection symptoms, may repeat dose every 3 days for up to 3 doses.    Prescription drug management      MED REC REQUIREDPost Medication Reconciliation Status:  Discharge medications reconciled and changed, see notes/orders        Kiara Mosquera is a 44 year old, presenting for the following health issues:  RECHECK      4/8/2024     9:43 AM   Additional Questions   Roomed by Tita NATHAN   Accompanied by self     Patient seen in emergency dept 4/6/24 for laceration on bridge of nose sustained after lid of a smoker fell on her. She has been having mild headaches and nausea since the incident. Wound was closed with surgical glue. Since being seen, she has noticed yellow discharge from the wound and increased redness/swelling. She denies fevers or feeling feverish.                Objective    /84   Pulse 89   Temp 97.7  F (36.5  C) (Tympanic)   Resp 16   Ht 1.549 m (5' 1\")   Wt 57.8 kg (127 lb 6.4 oz)   LMP 11/21/2001   SpO2 99%   BMI 24.07 kg/m    Body mass index is 24.07 kg/m .  Physical Exam  Constitutional:       Appearance: Normal appearance.   HENT:      Right Ear: External ear normal.      Left Ear: External ear normal.   Eyes:      Pupils: Pupils are equal, round, and reactive to light.   Cardiovascular:      Rate and Rhythm: Normal rate.   Pulmonary:      Effort: " Pulmonary effort is normal.   Skin:     General: Skin is warm and dry.      Findings: Laceration present.          Neurological:      General: No focal deficit present.      Mental Status: She is alert and oriented to person, place, and time.   Psychiatric:         Mood and Affect: Mood normal.         Behavior: Behavior normal.         Thought Content: Thought content normal.         Judgment: Judgment normal.                    Signed Electronically by: EDEL Berkowitz CNP

## 2024-04-08 NOTE — PROGRESS NOTES
"  {PROVIDER CHARTING PREFERENCE:425398}    Kiara Mosquera is a 44 year old, presenting for the following health issues:  RECHECK        4/8/2024     9:43 AM   Additional Questions   Roomed by Tita NATHAN   Accompanied by self     HPI       ED/UC Followup:    Facility:  [facility]  Date of visit: 04/06/2024  Reason for visit: Laceration of nose  Current Status: stable  {additonal problems for provider to add (Optional):640206}    {ROS Picklists (Optional):848510}      Objective    /84   Pulse 89   Temp 97.7  F (36.5  C) (Tympanic)   Resp 16   Ht 1.549 m (5' 1\")   Wt 57.8 kg (127 lb 6.4 oz)   LMP 11/21/2001   SpO2 99%   BMI 24.07 kg/m    Body mass index is 24.07 kg/m .  Physical Exam   {Exam List (Optional):313007}    {Diagnostic Test Results (Optional):313965}        Signed Electronically by: EDEL Berkowitz CNP  {Email feedback regarding this note to [email or web address]   :[id number]}  "

## 2024-05-26 ENCOUNTER — HEALTH MAINTENANCE LETTER (OUTPATIENT)
Age: 44
End: 2024-05-26

## 2024-06-19 ENCOUNTER — PATIENT OUTREACH (OUTPATIENT)
Dept: FAMILY MEDICINE | Facility: CLINIC | Age: 44
End: 2024-06-19
Payer: COMMERCIAL

## 2024-06-19 NOTE — TELEPHONE ENCOUNTER
Patient Quality Outreach    Patient is due for the following:   Physical Preventive Adult Physical      Topic Date Due    COVID-19 Vaccine (6 - 2023-24 season) 12/20/2023       Next Steps:   Schedule a Adult Preventative    Type of outreach:    Sent [email or web address] message.      Questions for provider review:    None           CHAVO BAER MA

## 2024-08-28 ENCOUNTER — OFFICE VISIT (OUTPATIENT)
Dept: OPTOMETRY | Facility: CLINIC | Age: 44
End: 2024-08-28
Payer: COMMERCIAL

## 2024-08-28 DIAGNOSIS — H52.223 REGULAR ASTIGMATISM OF BOTH EYES: ICD-10-CM

## 2024-08-28 DIAGNOSIS — E11.9 TYPE 2 DIABETES MELLITUS WITHOUT COMPLICATION, WITHOUT LONG-TERM CURRENT USE OF INSULIN (H): ICD-10-CM

## 2024-08-28 DIAGNOSIS — H52.4 PRESBYOPIA: ICD-10-CM

## 2024-08-28 DIAGNOSIS — H04.123 DRY EYE SYNDROME OF BOTH EYES: ICD-10-CM

## 2024-08-28 DIAGNOSIS — M06.9 RHEUMATOID ARTHRITIS, INVOLVING UNSPECIFIED SITE, UNSPECIFIED WHETHER RHEUMATOID FACTOR PRESENT (H): ICD-10-CM

## 2024-08-28 DIAGNOSIS — Z01.00 EXAMINATION OF EYES AND VISION: Primary | ICD-10-CM

## 2024-08-28 DIAGNOSIS — H10.13 ALLERGIC CONJUNCTIVITIS OF BOTH EYES: ICD-10-CM

## 2024-08-28 DIAGNOSIS — H52.13 MYOPIA OF BOTH EYES: ICD-10-CM

## 2024-08-28 PROCEDURE — 92004 COMPRE OPH EXAM NEW PT 1/>: CPT | Performed by: OPTOMETRIST

## 2024-08-28 PROCEDURE — 92015 DETERMINE REFRACTIVE STATE: CPT | Performed by: OPTOMETRIST

## 2024-08-28 PROCEDURE — 92310 CONTACT LENS FITTING OU: CPT | Mod: GA | Performed by: OPTOMETRIST

## 2024-08-28 RX ORDER — AZELASTINE HYDROCHLORIDE 0.5 MG/ML
1 SOLUTION/ DROPS OPHTHALMIC 2 TIMES DAILY PRN
Qty: 6 ML | Refills: 11 | Status: SHIPPED | OUTPATIENT
Start: 2024-08-28 | End: 2025-08-28

## 2024-08-28 RX ORDER — CYCLOSPORINE 0.5 MG/ML
1 EMULSION OPHTHALMIC 2 TIMES DAILY
Qty: 60 EACH | Refills: 11 | Status: SHIPPED | OUTPATIENT
Start: 2024-08-28

## 2024-08-28 ASSESSMENT — KERATOMETRY
OD_K2POWER_DIOPTERS: 47.00
OS_AXISANGLE_DEGREES: 074
OS_K2POWER_DIOPTERS: 47.00
OS_K1POWER_DIOPTERS: 46.00
OS_AXISANGLE2_DEGREES: 164
OD_AXISANGLE_DEGREES: 107
OD_K1POWER_DIOPTERS: 46.00
OD_AXISANGLE2_DEGREES: 017

## 2024-08-28 ASSESSMENT — CONF VISUAL FIELD
OS_INFERIOR_TEMPORAL_RESTRICTION: 0
OD_INFERIOR_NASAL_RESTRICTION: 0
OD_INFERIOR_TEMPORAL_RESTRICTION: 0
OS_SUPERIOR_TEMPORAL_RESTRICTION: 0
OD_SUPERIOR_TEMPORAL_RESTRICTION: 0
OS_INFERIOR_NASAL_RESTRICTION: 0
OD_SUPERIOR_NASAL_RESTRICTION: 0
OS_SUPERIOR_NASAL_RESTRICTION: 0
OS_NORMAL: 1
OD_NORMAL: 1

## 2024-08-28 ASSESSMENT — VISUAL ACUITY
OD_CC+: -1
OS_CC: 20/30
OD_SC: 20/400-
CORRECTION_TYPE: GLASSES
OS_CC+: -2
METHOD: SNELLEN - LINEAR
OD_CC: 20/20
OS_CC: 20/20
OD_CC: 20/40-1
OS_SC: 20/400-

## 2024-08-28 ASSESSMENT — REFRACTION_WEARINGRX
SPECS_TYPE: SVL
OD_SPHERE: -7.00
OS_CYLINDER: +1.00
OS_AXIS: 037
OS_SPHERE: -6.75
OD_CYLINDER: +0.75
OD_AXIS: 135

## 2024-08-28 ASSESSMENT — SLIT LAMP EXAM - LIDS
COMMENTS: MEIBOMIAN GLAND DYSFUNCTION
COMMENTS: MEIBOMIAN GLAND DYSFUNCTION

## 2024-08-28 ASSESSMENT — REFRACTION_MANIFEST
OD_CYLINDER: -0.75
OS_SPHERE: -6.00
OS_AXIS: 127
OD_ADD: +1.25
OD_AXIS: 045
OD_SPHERE: -6.25
OS_CYLINDER: -1.00
OS_ADD: +1.25

## 2024-08-28 ASSESSMENT — REFRACTION_CURRENTRX
OD_SPHERE: -5.75
OD_BRAND: ALCON PRECISION 1 DAY FOR ASTIGMATISM BC 8.5 D 14.5
OS_AXIS: 130
OS_SPHERE: -5.50
OS_CYLINDER: -0.75
OD_CYLINDER: -0.75
OS_BRAND: ALCON PRECISION 1 DAY FOR ASTIGMATISM BC 8.5 D 14.5
OD_AXIS: 050

## 2024-08-28 ASSESSMENT — TONOMETRY
IOP_METHOD: ICARE
OD_IOP_MMHG: 20.9
OS_IOP_MMHG: 19.0

## 2024-08-28 ASSESSMENT — EXTERNAL EXAM - RIGHT EYE: OD_EXAM: NORMAL

## 2024-08-28 ASSESSMENT — CUP TO DISC RATIO
OS_RATIO: 0.15
OD_RATIO: 0.15

## 2024-08-28 ASSESSMENT — EXTERNAL EXAM - LEFT EYE: OS_EXAM: NORMAL

## 2024-08-28 NOTE — LETTER
8/28/2024      Demetri Mosley  [address] [facility] [address]      Dear Colleague,    Thank you for referring your patient, Demetri Mosley, to the [facility]. Please see a copy of my visit note below.    Chief Complaint   Patient presents with     Annual Eye Exam       Last Eye Exam: 2021  Dilated Previously: Yes    What are you currently using to see?  Glasses,patient used contacts before covid     Distance Vision Acuity: Satisfied with vision    Near Vision Acuity: Not satisfied,has to take glasses off to read getting worse     Eye Comfort: itchy and dry ,ragweed season  Do you use eye drops? : No  Occupation or Hobbies: research  - [employer]    History of type 2 diabetes- 2020-   Last A1 C-  4 something.  Using Ozempic- does not check blood sugar regularly at this time.    History of RA- treated with methotrexate  Has dry eyes and dry mouth- waiting to schedule biopsy to rule out Sjorgren's    Left eye dominant-    Stephania Pimentel Optometric Assistant, A.B.O.C.      Medical, surgical and family histories reviewed and updated 8/28/2024.       OBJECTIVE: See Ophthalmology exam    ASSESSMENT:    ICD-10-CM    1. Examination of eyes and vision  Z01.00 EYE EXAM (SIMPLE-NONBILLABLE)      2. Myopia of both eyes  H52.13 REFRACTION     CONTACT LENS FITTING,BILAT w/ signed waiver      3. Regular astigmatism of both eyes  H52.223 REFRACTION     CONTACT LENS FITTING,BILAT w/ signed waiver      4. Presbyopia  H52.4 REFRACTION      5. Type 2 diabetes mellitus without complication, without long-term current use of insulin (H)  E11.9     Negative diabetic retinopathy both eyes      6. Rheumatoid arthritis, involving unspecified site, unspecified whether rheumatoid factor present (H)  M06.9 EYE EXAM (SIMPLE-NONBILLABLE)      7. Dry eye syndrome of both eyes  H04.123 EYE EXAM (SIMPLE-NONBILLABLE)     cycloSPORINE (RESTASIS) 0.05 % ophthalmic emulsion      8. Allergic conjunctivitis of  both eyes  H10.13 EYE EXAM (SIMPLE-NONBILLABLE)     azelastine (OPTIVAR) 0.05 % ophthalmic solution          PLAN:     Patient Instructions   Eyeglass prescription given.    Trial contact lenses will be ordered for .  Schedule a recheck about 1 week after picking up lenses.  Be sure to wear lenses to that appointment.  You may need to wear OTC readers over contacts for near vision.    There are not any signs of the diabetes affecting the eyes today.  It is important that you get your eyes dilated once yearly and keep good control of your diabetes.    Restasis- 1 drop both eyes 2 x day, once in the am and once in the pm.    Heat to the eyes daily for 10-15 minutes nightly with warm washcloth or reusable gel masks from the pharmacy or  [employer] heat masks can be purchased at [employer].    Tea tree oil wipes or foam to cleanse eyelids/lashes in am and pm.    Optivar- 1 drop both eyes 2 x day as needed for itchy eyes.    Recommend annual eye exams.    Modesto Wilson, OD         Again, thank you for allowing me to participate in the care of your patient.        Sincerely,        Modesto Wilson, OD

## 2024-08-28 NOTE — PROGRESS NOTES
Chief Complaint   Patient presents with    Annual Eye Exam       Last Eye Exam: 2021  Dilated Previously: Yes    What are you currently using to see?  Glasses,patient used contacts before covid     Distance Vision Acuity: Satisfied with vision    Near Vision Acuity: Not satisfied,has to take glasses off to read getting worse     Eye Comfort: itchy and dry ,ragweed season  Do you use eye drops? 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  Using Ozempic- does not check blood sugar regularly at this time.    History of RA- treated with methotrexate  Has dry eyes and dry mouth- waiting to schedule biopsy to rule out Sjorgren's    Left eye dominant-    Stephania Pimentel Optometric Assistant, A.B.O.C.      Medical, surgical and family histories reviewed and updated 8/28/2024.       OBJECTIVE: See Ophthalmology exam    ASSESSMENT:    ICD-10-CM    1. Examination of eyes and vision  Z01.00 EYE EXAM (SIMPLE-NONBILLABLE)      2. Myopia of both eyes  H52.13 REFRACTION     CONTACT LENS FITTING,BILAT w/ signed waiver      3. Regular astigmatism of both eyes  H52.223 REFRACTION     CONTACT LENS FITTING,BILAT w/ signed waiver      4. Presbyopia  H52.4 REFRACTION      5. Type 2 diabetes mellitus without complication, without long-term current use of insulin (H)  E11.9     Negative diabetic retinopathy both eyes      6. Rheumatoid arthritis, involving unspecified site, unspecified whether rheumatoid factor present (H)  M06.9 EYE EXAM (SIMPLE-NONBILLABLE)      7. Dry eye syndrome of both eyes  H04.123 EYE EXAM (SIMPLE-NONBILLABLE)     cycloSPORINE (RESTASIS) 0.05 % ophthalmic emulsion      8. Allergic conjunctivitis of both eyes  H10.13 EYE EXAM (SIMPLE-NONBILLABLE)     azelastine (OPTIVAR) 0.05 % ophthalmic solution          PLAN:     Patient Instructions   Eyeglass prescription given.    Trial contact lenses will be ordered for .  Schedule a  recheck about 1 week after picking up lenses.  Be sure to wear lenses to that appointment.  You may need to wear OTC readers over contacts for near vision.    There are not any signs of the diabetes affecting the eyes today.  It is important that you get your eyes dilated once yearly and keep good control of your diabetes.    Restasis- 1 drop both eyes 2 x day, once in the am and once in the pm.    Heat to the eyes daily for 10-15 minutes nightly with warm washcloth or reusable gel masks from the pharmacy or  [employer] heat masks can be purchased at [employer].    Tea tree oil wipes or foam to cleanse eyelids/lashes in am and pm.    Optivar- 1 drop both eyes 2 x day as needed for itchy eyes.    Recommend annual eye exams.    Modesto Wilson, OD

## 2024-08-28 NOTE — PATIENT INSTRUCTIONS
Eyeglass prescription given.    Trial contact lenses will be ordered for .  Schedule a recheck about 1 week after picking up lenses.  Be sure to wear lenses to that appointment.  You may need to wear OTC readers over contacts for near vision.    There are not any signs of the diabetes affecting the eyes today.  It is important that you get your eyes dilated once yearly and keep good control of your diabetes.    Restasis- 1 drop both eyes 2 x day, once in the am and once in the pm.    Heat to the eyes daily for 10-15 minutes nightly with warm washcloth or reusable gel masks from the pharmacy or  [employer] heat masks can be purchased at [employer].    Tea tree oil wipes or foam to cleanse eyelids/lashes in am and pm.    Optivar- 1 drop both eyes 2 x day as needed for itchy eyes.    Recommend annual eye exams.    Modesto Wilson, AMY    The affects of the dilating drops last for 4- 6 hours.  You will be more sensitive to light and vision will be blurry up close.  Do not drive if you do not feel comfortable.  Mydriatic sunglasses were given if needed.    Patient Education   Diabetes weakens the blood vessels all over the body, including the eyes. Damage to the blood vessels in the eyes can cause swelling or bleeding into part of the eye (called the retina). This is called diabetic retinopathy (MCKINLEY-tin-AH-puh-thee). If not treated, this disease can cause vision loss or blindness.   Symptoms may include blurred or distorted vision, but many people have no symptoms. It's important to see your eye doctor regularly to check for problems.   Early treatment and good control can help protect your vision. Here are the things you can do to help prevent vision loss:      1. Keep your blood sugar levels under tight control.      2. Bring high blood pressure under control.      3. No smoking.      4. Have yearly dilated eye exams.       Optometry Providers       Clinic Locations                                 Telephone Number   Dr. Boo  Bertin Newsome    [place]   [facility]  [place] [phone]     [facility] [facility] Hours:                [place] [facility] Hours:       [place] [facility] Hours:   [address]   [address]     [address] [place] [address]   [place] [address]    [place] [address]  Phone: [phone]                    Phone: [phone]     Phone: [phone]                      Monday 8:00-6:00                          Monday 8:00-6:00                          Monday 8:00-6:00              Tuesday 8:00-6:00                          Tuesday 8:00-6:00                          Tuesday 8:00-6:00              Wednesday 8:00-6:00                  Wednesday 8:00-6:00                   Wednesday 8:00-6:00      Thursday 8:00-6:00                        Thursday 8:00-6:00                         Thursday 8:00-6:00            Friday 8:00-5:00                              Friday 8:00-5:00                              Friday 8:00-5:00    [facility] Hours:   [address] [place] [address]  [phone]    Monday 9:00-6:00  Tuesday 9:00-6:00  Wednesday 9:00-6:00  Thursday 9:00-6:00  Friday 9:00-5:00  As always, Thank you for trusting us with your health care needs!

## 2024-09-19 ENCOUNTER — OFFICE VISIT (OUTPATIENT)
Dept: OPTOMETRY | Facility: CLINIC | Age: 44
End: 2024-09-19
Payer: COMMERCIAL

## 2024-09-19 DIAGNOSIS — H52.223 REGULAR ASTIGMATISM OF BOTH EYES: ICD-10-CM

## 2024-09-19 DIAGNOSIS — H52.13 MYOPIA OF BOTH EYES: Primary | ICD-10-CM

## 2024-09-19 PROCEDURE — 99207 PR NO CHARGE LOS: CPT | Performed by: OPTOMETRIST

## 2024-09-19 ASSESSMENT — REFRACTION_CURRENTRX
OD_CYLINDER: -0.75
OS_SPHERE: -5.50
OD_SPHERE: -5.75
OS_BRAND: ALCON PRECISION 1 DAY FOR ASTIGMATISM BC 8.5 D 14.5
OD_AXIS: 050
OS_AXIS: 130
OS_CYLINDER: -0.75
OD_BRAND: ALCON PRECISION 1 DAY FOR ASTIGMATISM BC 8.5 D 14.5

## 2024-09-19 ASSESSMENT — REFRACTION_WEARINGRX
OS_CYLINDER: +1.00
OD_CYLINDER: +0.75
OD_AXIS: 135
OD_CYLINDER: +0.75
OS_AXIS: 037
OD_AXIS: 135
SPECS_TYPE: NEAR
OS_SPHERE: -5.75
OS_AXIS: 037
OS_CYLINDER: +1.00
OS_ADD: +1.25
OD_SPHERE: -7.00
OD_ADD: +1.25
OD_SPHERE: -5.75
OS_SPHERE: -7.00

## 2024-09-19 ASSESSMENT — REFRACTION_MANIFEST
OS_SPHERE: -7.00
OD_AXIS: 135
OD_CYLINDER: +0.75
OD_ADD: +1.25
OD_SPHERE: -7.00
OS_ADD: +1.25
OS_AXIS: 037
OS_CYLINDER: +1.00

## 2024-09-19 NOTE — LETTER
9/19/2024      Deemtri Mosley  [address] [facility] [address]      Dear Colleague,    Thank you for referring your patient, Demetri Mosley, to the [facility]. Please see a copy of my visit note below.    Chief Complaint   Patient presents with     Contact Lens Check     Satisfied with contacts:  feels like can't see when driving    Good comfort:  Yes,feel better than last brand   Clear vision:     not sure street signs not really clear     Previously wore Softmed 8.7/14.5  Right eye -5.50 -.75 x 050  Left eye- -5.50 -.75 x 030    Will wear occasionally      Patient is on computer up to 17 hours per day    Stephania Pimentel Optometric Assistant, A.B.O.C.    Medical, surgical and family histories reviewed and updated 9/19/2024.       OBJECTIVE: See Ophthalmology exam    ASSESSMENT:    ICD-10-CM    1. Myopia of both eyes  H52.13       2. Regular astigmatism of both eyes  H52.223          PLAN:    Patient Instructions   Contact lens prescription given and form signed.  There will be a slight change in prescription as demonstrated.    Return in 1 year for a complete eye exam or sooner if needed.    Modesto Wilson, MAY                     Again, thank you for allowing me to participate in the care of your patient.        Sincerely,        Modesto Wilson, OD

## 2024-09-19 NOTE — PROGRESS NOTES
Chief Complaint   Patient presents with    Contact Lens Check     Satisfied with contacts:  feels like can't see when driving    Good comfort:  Yes,feel better than last brand   Clear vision:     not sure street signs not really clear     Previously wore Softmed 8.7/14.5  Right eye -5.50 -.75 x 050  Left eye- -5.50 -.75 x 030    Will wear occasionally      Patient is on computer up to 17 hours per day    Stephania Pimentel Optometric Assistant, A.B.O.C.    Medical, surgical and family histories reviewed and updated 9/19/2024.       OBJECTIVE: See Ophthalmology exam    ASSESSMENT:    ICD-10-CM    1. Myopia of both eyes  H52.13       2. Regular astigmatism of both eyes  H52.223          PLAN:    Patient Instructions   Contact lens prescription given and form signed.  There will be a slight change in prescription as demonstrated.    Return in 1 year for a complete eye exam or sooner if needed.    Modesto Wilson, OD

## 2024-09-19 NOTE — PATIENT INSTRUCTIONS
Contact lens prescription given and form signed.  There will be a slight change in prescription as demonstrated.    Return in 1 year for a complete eye exam or sooner if needed.    Modesto Wilson, OD

## 2024-10-13 ENCOUNTER — HEALTH MAINTENANCE LETTER (OUTPATIENT)
Age: 44
End: 2024-10-13

## 2024-10-28 NOTE — TELEPHONE ENCOUNTER
Fax message regarding   sulfamethoxazole-trimethoprim (BACTRIM DS) 800-160 MG tablet   Rcvd:    Patient is taking Methotrexate. Interaction- do you still want to still prescribe or adjust dose? Thanks.   34w

## 2024-11-04 ENCOUNTER — TRANSFERRED RECORDS (OUTPATIENT)
Dept: MULTI SPECIALTY CLINIC | Facility: CLINIC | Age: 44
End: 2024-11-04

## 2024-11-04 LAB — HBA1C MFR BLD: 5 % (ref 4.8–5.6)

## 2024-12-31 ENCOUNTER — PATIENT OUTREACH (OUTPATIENT)
Dept: FAMILY MEDICINE | Facility: CLINIC | Age: 44
End: 2024-12-31
Payer: COMMERCIAL

## 2024-12-31 NOTE — TELEPHONE ENCOUNTER
Patient Quality Outreach    Patient is due for the following:   Diabetes -  A1C, LDL (Fasting), Microalbumin, Diabetic Follow-Up Visit, and Foot Exam  Asthma  -  ACT needed  Physical Preventive Adult Physical      Topic Date Due    Hepatitis B Vaccine (1 of 3 - 19+ 3-dose series) Never done    Flu Vaccine (1) 09/01/2024    COVID-19 Vaccine (6 - 2024-25 season) 09/01/2024       Action(s) Taken:   Schedule a Adult Preventative  Patient was assigned appropriate questionnaire to complete    Type of outreach:    Sent [employer] message.    Questions for provider review:    None           CHAVO BAER MA

## 2025-01-07 ENCOUNTER — VIRTUAL VISIT (OUTPATIENT)
Dept: GASTROENTEROLOGY | Facility: CLINIC | Age: 45
End: 2025-01-07
Payer: COMMERCIAL

## 2025-01-07 DIAGNOSIS — R09.A2 GLOBUS SENSATION: Primary | ICD-10-CM

## 2025-01-07 NOTE — PATIENT INSTRUCTIONS
It was a pleasure taking care of you today.  I've included a brief summary of our discussion and care plan from today's visit below.  Please review this information with your primary care provider.      Please schedule an upper endoscopy with dilation of the UES and LES for your symptoms of globus      If you feel you received exceptional care and are interested in supporting the clinical and research goals of Dr. Orourke or the Division of Gastroenterology, Hepatology, and Nutrition please contact him directly through [employer]  to discuss opportunities to donate.    Sincerely,    Tejas Orourke DO   of Medicine  Director, Esophageal Disorders Program  Division of Gastroenterology, Hepatology, and Nutrition  [facility]      Please see below for any additional questions and scheduling guidelines.    Sign up for [employer]: [employer] patient portal serves as a secure platform for accessing your medical records from the [facility]. Additionally, [employer] facilitates easy, timely, and secure messaging with your care team. If you have not signed up, you may do so by using the provided code or calling [phone].    Coordinating your care after your visit:  There are multiple options for scheduling your follow-up care based on your provider's recommendation.    How do I schedule a follow-up clinic appointment:   After your appointment, you may receive scheduling assistance with the Clinic Coordinators by having a seat in the waiting room and a Clinic Coordinator will call you up to schedule.  Virtual visits or after you leave the clinic:  Your provider has placed a follow-up order in the [employer] portal for scheduling your return appointment. A member of the scheduling team will contact you to schedule.  [employer] Scheduling: Timely scheduling through [employer] is advised to ensure appointment availability.   Call to schedule: You may schedule your follow-up appointment(s) by calling  [phone], option 1.    How do I schedule my endoscopy or colonoscopy procedure:  If a procedure, such as a colonoscopy or upper endoscopy was ordered by your provider, the scheduling team will contact you to schedule this procedure. Or you may choose to call to schedule at   [phone], option 2.  Please allow 20-30 minutes when scheduling a procedure.    How do I get my blood work done? To get your blood work done, you need to schedule a lab appointment at an [facility]. There are multiple ways to schedule:   At the clinic: The Clinic Coordinator you meet after your visit can help you schedule a lab appointment.   [employer] scheduling: [employer] offers online lab scheduling at all [facility] laboratory locations.   Call to schedule: You can call [phone] to schedule your lab appointment.    How do I schedule my imaging study: To schedule imaging studies, such as CT scans, ultrasounds, MRIs, or X-rays, contact Imaging Services at [phone].    How do I schedule a referral to another doctor: If your provider recommended a referral to another specialist(s), the referral order was placed by your provider. You will receive a phone call to schedule this referral, or you may choose to call the number attached to the referral to self-schedule.    For Post-Visit Question(s):  For any inquiries following today's visit:  Please utilize [employer] messaging and allow 48 hours for reply or contact the Call Center during normal business hours at [phone], option 3.  For Emergent After-hours questions, contact the On-Call GI Fellow through the [facility]  at [phone].  In addition, you may contact your Nurse directly using the provided contact information.    Test Results:  Test results will be accessible via [employer] in compliance with the 21st Century Cures Act. This means that your results will be available to you at the same time as your provider. Often you may see your  results before your provider does. Results are reviewed by staff within two weeks with communication follow-up. Results may be released in the patient portal prior to your care team review.    Prescription Refill(s):  Medication prescribed by your provider will be addressed during your visit. For future refills, please coordinate with your pharmacy. If you have not had a recent clinic visit or routine labs, for your safety, your provider may not be able to refill your prescription.

## 2025-01-07 NOTE — PROGRESS NOTES
"Virtual Visit Details    Type of service:  Video Visit     Originating Location (pt. Location): Home    Distant Location (provider location):  Off-site  Platform used for Video Visit: [facility]    Gastroenterology Visit for: Demetri Mosley 1980   MRN: [id number]     Reason for Visit:  chief complaint    Referred by: Nat  / [address] [facility] [address] / [facility] [id number]  Patient Care Team:  Nikolay Baeza MD as PCP - General (Family Practice)  Chris Boyce MD as MD (Hand Surgery)  Yasmine Berrios APRN CNP as Nurse Practitioner (Nurse Practitioner - Adult Health)  Dada Ge MD as MD (Specialist)  Lesvia Garcia MD as MD (Orthopedics)  Alesha Johns APRN CNS as Registered Nurse (Clinical Nurse Specialist)  Tejas Orourke DO as MD (Gastroenterology)  Bianca Foster APRN CNP as Assigned PCP  Modesto Wilson OD as Assigned Surgical Provider    History of Present Illness:   Demetri Mosley is a 44 year old female with allergies, \"GI issues forever\", RA, 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 Johns with a chief complaint of globus, and dysphagia.     1/7/25  She continues to have a sensation of something stuck in her throat and it has a tightness feeling. She feels that it has been worse over the past year. The patient felt that the dilation may have helped but the sensation was better until over the past year. Occasionally feels as if she will choke on her own saliva or thin liquids for no reason. Has tried Topamax (created brain fog), amitriptyline made her feel hung over in the morning and had a similar sensation with nortriptyline.     See updated BEDQ and Eckardt score below: These patient reported outcomes are pertinent to the HPI and have personally been reviewed.    -----------------------------------------------------------------------------  9/29/22  No " change in globus and food catching sensation is unchanged. Taking PPI BID with no change in symptoms. Globus persists. Stellate ganglion injection helps for at most a few days after she gets one however it has been awhile due to scheduling. Allergic to gabapentin and pedal edema with lyrica. Has a prescription for nortriptyline. Not interested in mirtazapine due to weight gain side effect. Last dilation was done in 2016 after nissen before globus sensation.     ---------------------------------------------------------------  5/24/22  Demetri Mosley states 4-5 years ago thought she swallowed fish bone and had the sensation of something in her esophagus. Thought she had reflux based on ENT scope. She feels that food gets stuck occasionally. It has been years since a dilation. She felt that dilation in the past helped but that was mid sternal sensation compared to now which is upper esophageal. Gets stellate ganglion blocks for CRPS. Symptoms got better following injection. Following injection symptoms were less and is getting injections every other week. Leading up to an injection she can be at a 10/10 and injection takes it down to a 2/10. Sensation is present always but worse when she swallows. Last scope was prior to this current sensation    Had distal esophageal dysphagia and barium tab get stuck. Had HRM at that time. 2019 HRM was performed while having the upper esophageal symptoms and did not identify an etiology.    Has port for infusions for RA. Able to access for procedures.     The patient states she has been scoped a lot. Prior nissen (2015) performed at the [facility]. Two prior ENTs ([facility] [place] and private practice)    20 allergies: gabapentin allergy  TCAs are too sedating for the patient currently in grad school for public health. Patient literacy of chronic respiratory disease pre/post genetic sequencing.     PROPOFOL REQUIRED  ---------------------------------------------------------------     Demetri BOWLES  Melany denies  odynophagia,  nausea, vomiting, early satiety, abdominal pain, abdominal distension/bloating, diarrhea, constipation, hematochezia, or melena. No unintentional weight loss.     Wt Readings from Last 5 Encounters:   04/08/24 57.8 kg (127 lb 6.4 oz)   04/06/24 56.2 kg (124 lb)   02/05/24 58.1 kg (128 lb)   04/18/23 56.8 kg (125 lb 2 oz)   03/15/23 59.9 kg (132 lb)        Esophageal Questionnaire(s)    BEDQ Questionnaire      5/24/2022     7:00 AM 1/7/2025    12:09 PM   BEDQ Questionnaire: How Often Have You Had the Following?   Trouble eating solid food (meat, bread, vegetables) 4 4   Trouble eating soft foods (yogurt, jello, pudding) 0 2   Trouble swallowing liquids 0 1   Pain while swallowing 5 4   Coughing or choking while swallowing foods or liquids 2 4   Total Score: 11 15        Patient-reported         5/24/2022     7:00 AM 1/7/2025    12:09 PM   BEDQ Questionnaire: Discomfort/Pain Ratings   Eating solid food (meat, bread, vegetables) 2 2   Eating soft foods (yogurt, jello, pudding) 0 0   Drinking liquid 0 0   Total Score: 2 2        Patient-reported       Eckardt Questionnaire      5/24/2022     7:00 AM 1/7/2025    12:09 PM   Eckardt Questionnaire   Dysphagia 2 3   Regurgitation 1 1   Retrosternal Pain 1 1   Weight Loss (kg) 0 0   Total Score:  4 5        Patient-reported       Promis 10 Questionnaire      6/30/2015     8:00 AM 5/24/2022     7:00 AM 1/7/2025    12:10 PM   PROMIS 10 FLOWSHEET DATA   In general, would you say your health is: 2 3 3   In general, would you say your quality of life is: 3 3 3   In general, how would you rate your physical health? 2 3 3   In general, how would you rate your mental health, including your mood and your ability to think? 3 3 3   In general, how would you rate your satisfaction with your social activities and relationships? 2 2 3   In general, please rate how well you carry out your usual social activities and roles. (This includes activities at home, at  work and in your community, and responsibilities as a parent, child, spouse, employee, friend, etc.) 2 3 3   To what extent are you able to carry out your everyday physical activities such as walking, climbing stairs, carrying groceries, or moving a chair? 2 3 5   In the past 7 days, how often have you been bothered by emotional problems such as feeling anxious, depressed, or irritable? 3 3 2   In the past 7 days, how would you rate your fatigue on average? 3 3 2   In the past 7 days, how would you rate your pain on average, where 0 means no pain, and 10 means worst imaginable pain? 3 5 3   Mental health question re-calculation - no clinical value  3 4    Physical health question re-calculation - no clinical value  3 4    Pain question re-calculation - no clinical value 4 3 4    Global Mental Health Score 11 11 13    Global Physical Health Score 11 12 16    PROMIS TOTAL - SUBSCORES 22 23 29        Patient-reported     STUDIES & PROCEDURES:    EGD:   1/19/23   The Z-line was regular and was found 39 cm from the incisors. The BRAVO        capsule with delivery system was introduced through the mouth and        advanced into the esophagus, such that the BRAVO pH capsule was        positioned 33 cm from the incisors, which was 6 cm proximal to the GE        junction. Suction was applied to the well of the BRAVO pH capsule to        suck in the adjacent mucosa of the esophagus using the external vacuum        pump for 30 seconds. The BRAVO pH capsule was then deployed by        depressing the plunger on top of the handle to advance the locking pin        into the mucosa, thereby attaching the capsule to the esophagus. The        plunger was then rotated a quarter turn clockwise to release the capsule        from the delivery system. The delivery system was then withdrawn.        Endoscopy was utilized for probe placement and diagnostic evaluation.        The gastroesophageal flap valve was visualized endoscopically and         classified as Hill Grade I (prominent fold, tight to endoscope).        The examined esophagus was normal, small distal esophageal diverticulum        noted. A TTS dilator was passed through the scope. Emperic dilation with        15-16-17-18 dilator was performed at both the UES and and LES. The        dilation site was examined following endoscope reinsertion and showed no        change. Estimated blood loss: none.        The entire examined stomach was normal.        The duodenal bulb, first portion of the duodenum and second portion of        the duodenum were normal.                                                                                    Impression:            - Z-line regular, 39 cm from the incisors.                          - Gastroesophageal flap valve classified as Hill Grade                          I (prominent fold, tight to endoscope).                          - Normal esophagus. Dilated.                          - Normal stomach.                          - Normal duodenal bulb, first portion of the duodenum                          and second portion of the duodenum.                          - The BRAVO pH capsule was positioned 33 cm from the                          incisors, which was 6 cm proximal to the GE junction.                          - No specimens collected.     EGD  Date 8/8/22  The Z-line was regular and was found 39 cm from the incisors.        A non-bleeding diverticulum with a small opening and no stigmata of        recent bleeding was found in the distal esophagus.        Normal mucosa was found in the entire esophagus. Eight biopsies were        obtained with cold large-capacity forceps for histology randomly in the        proximal esophagus and in the distal esophagus.        The gastroesophageal flap valve was visualized endoscopically and        classified as Hill Grade I (prominent fold, tight to endoscope).        The entire examined stomach was normal.         The duodenal bulb, first portion of the duodenum, second portion of the        duodenum and examined duodenum were normal.                                                                                     Impression:            - Z-line regular, 39 cm from the incisors.                          - Diverticulum in the distal esophagus.                          - Normal mucosa was found in the entire esophagus.                          - Gastroesophageal flap valve classified as Hill Grade                          I (prominent fold, tight to endoscope).                          - Normal stomach.                          - Normal duodenal bulb, first portion of the duodenum,                          second portion of the duodenum and examined duodenum.                          - Eight biopsies were obtained in the proximal                          esophagus and in the distal esophagus.   Pathology  A.  ESOPHAGUS, DISTAL, BIOPSY:  - Squamous mucosa with mild reactive changes  - No intraepithelial eosinophils identified    B.  ESOPHAGUS, PROXIMAL, BIOPSY:  - Squamous mucosa with mild reactive changes  - No intraepithelial eosinophils identified       EGD  Date: 6/2/16  Impression:   The examined esophagus was normal.        The entire examined stomach was normal.        The examined duodenum was normal. Biopsies were taken with a cold        forceps for evaluation of celiac disease.                                                                                    Impression:          - Normal esophagus.                        - Normal stomach.                        - Normal examined duodenum. Biopsied.   Recommendation:      - Await pathology results.                        - Discharge patient to home.                        - Return to previous diet.   Pathology Report:    Colonoscopy:  Date:  Impression:  Pathology Report:     EndoFLIP directed at the UES or LES (8cm (EF-325) balloon length or 16cm (EF-322) balloon  length):   Date:  8cm balloon  Balloon inflation Balloon pressure CSA (mm^2) DI (mm^2/mmHg) Dmin (mm) Compliance   20 (ladmark ID)        30        40        50           16cm balloon  Balloon inflation Balloon pressure CSA (mm^2) DI (mm^2/mmHg) Dmin (mm) Compliance   30 (ladmark ID)        40        50        60        70           High Resolution Manometry:  Date: 5/8/2019  Impression:    High Resolution Manometry  2/17/16    PH/Impedance:  Date: 3/11/16  Impression:       Hardy:  96hr  Date: 1/27/23  Impression:  This was a normal study OFF therapy.  The overall % acid exposure of 0.1% and DeMeester overall of 0.6 was normal not indicating ongoing acid reflux.  There were 3 occurrences of chest pain, 1 of regurgitation, and 2 of globus sensation symptoms. SI was 0 (0/3), 0 (0/1), and 0 ()/2) respectively overall for acid reflux which indicates a less than complete correlation. The SAP was 81.8, 93.0, 87.0 respectively indicating a less than complete correlation. Interpret within clinical context.     Day 1  0.0% acid exposure, DeMeester Score 0.6   Day 2  0.2% acid exposure, DeMeester Score 1.2   Day 3  0.0% acid exposure, DeMeester Score 0.3   Day 4  0.0% acid exposure, DeMeester Score 0.3     This study was interpreted on 2/3/23     CT:  Date:  Impression:    Esophagram:  Date: 1/13/22  Impression:  FINDINGS: A single contrast esophagram was performed. There are no  mucosal abnormalities. There are no strictures or filling defects.  Esophageal motility is intact. No evidence of reflux.     An upper GI was performed and the esophagus, stomach, and duodenum are  unremarkable. Esophageal motility is unremarkable. The rugal pattern  within the stomach is unremarkable with no ulcer identified. The  duodenum is well-seen in distended and contracted states. The  visualized jejunum is normal. Fundoplication changes. There are no  filling defects seen within the esophagus, stomach, or duodenum. No  spontaneous reflux  was seen.                                                                      IMPRESSION: Fundoplication changes with otherwise normal upper GI. No  evidence of reflux.    Video Swallow  6/7/2019    FINDINGS: The swallowing mechanism is within normal limits with no  penetration or aspiration. No pharyngeal mass, web, or diverticulum  are seen.                                                                      IMPRESSION: Normal swallowing mechanism, as described above.    Esophagram   2/6/2019  IMPRESSION:   1. Again seen smooth relative narrowing of the esophagus at the level  of the aortic arch with difficulty passing a barium tablet at this  level. The tablet passed distally with multiple sips. No mucosal   abnormality or mass at this level.  2. Also redemonstration of the narrowing at the gastroesophageal  junction secondary to Nissen fundoplication causes barium tablet.  Eventual passage with multiple sips.  3. No gastroesophageal reflux was appreciated.     Prior medical records were reviewed including, but not limited to, notes from referring providers, lab work, radiographic tests, and other diagnostic tests. Pertinent results were summarized above.     History     Past Medical History:   Diagnosis Date    Allergic rhinitis due to animal dander     Anemia     Arthritis     rheumatoid    Chronic abdominal pain     Cyclic vomiting syndrome     Diabetes (H)     type 2    Diagnostic skin and sensitization tests (aka ALLERGENS) 2/19/15 IgE tests pos. for: cat/dog/DM/T/G/RW, NEG  for fish and shellfish.    DVT (deep venous thrombosis) (H) 01/01/2005    s/p long hospitalization - anticoagulated x 6 months    Endometriosis 01/01/2009    total hysterectomy    Gastro-oesophageal reflux disease     House dust mite allergy     2/19/15 IgE tests pos. for: cat/dog/DM/T/G/RW, NEG  for fish and shellfish.    Migraines     parotid mass 01/01/2012    benign, left, removed    PONV (postoperative nausea and vomiting)      Pyelonephritis due to Escherichia coli 05/15/2016    Uncomplicated asthma        Past Surgical History:   Procedure Laterality Date    CHOLECYSTECTOMY      COLONOSCOPY N/A 6/2/2016    Procedure: COLONOSCOPY;  Surgeon: Edvin Summers MD;  Location:  GI    ENT SURGERY      T&A    ESOPHAGOSCOPY, GASTROSCOPY, DUODENOSCOPY (EGD), COMBINED N/A 6/2/2016    Procedure: COMBINED ESOPHAGOSCOPY, GASTROSCOPY, DUODENOSCOPY (EGD), BIOPSY SINGLE OR MULTIPLE;  Surgeon: Edvin Summers MD;  Location:  GI    ESOPHAGOSCOPY, GASTROSCOPY, DUODENOSCOPY (EGD), COMBINED N/A 8/8/2022    Procedure: ESOPHAGOGASTRODUODENOSCOPY, WITH BIOPSY;  Surgeon: Tejas Orourke DO;  Location: [facility] OR    ESOPHAGOSCOPY, GASTROSCOPY, DUODENOSCOPY (EGD), DILATATION, COMBINED N/A 2/1/2017    Procedure: COMBINED ESOPHAGOSCOPY, GASTROSCOPY, DUODENOSCOPY (EGD), DILATATION;  Surgeon: Corry Franco MD;  Location:  OR    GI SURGERY      ERCP    GYN SURGERY      hysterectomy    HC CAPSULE ENDOSCOPY N/A 9/13/2016    Procedure: CAPSULE/PILL CAM ENDOSCOPY;  Surgeon: Iraj Choe MD;  Location:  GI    HC ESOPH/GAS REFLUX TEST W NASAL IMPED >1 HR N/A 2/17/2016    Procedure: ESOPHAGEAL IMPEDENCE FUNCTION TEST WITH 24 HOUR PH GREATER THAN 1 HOUR;  Surgeon: Charan Ozuna MD;  Location:  GI    HC UGI ENDOSCOPY W EUS  5/30/2013    Procedure: COMBINED ENDOSCOPIC ULTRASOUND, ESOPHAGOSCOPY, GASTROSCOPY, DUODENOSCOPY (EGD);  Surgeon: Juan Yu MD;  Location:  GI    HEAD & NECK SURGERY      tumor removed    HYSTERECTOMY, PAP NO LONGER INDICATED      INJECT BLOCK MEDIAL BRANCH CERVICAL/THORACIC/LUMBAR Bilateral 3/25/2015    Procedure: INJECT BLOCK MEDIAL BRANCH CERVICAL / THORACIC / LUMBAR;  Surgeon: Malvin Rivera MD;  Location: [facility] OR    INSERT PORT VASCULAR ACCESS N/A 9/12/2016    Procedure: INSERT PORT VASCULAR ACCESS;  Surgeon: Juani Casiano MD;  [facility]: [facility] OR    IR CHEST PORT PLACEMENT > 5 YRS OF AGE  7/26/2016     LAPAROSCOPIC NISSEN FUNDOPLICATION N/A 2016    Procedure: LAPAROSCOPIC NISSEN FUNDOPLICATION;  Surgeon: Noah Brito MD;  Location: [facility] OR    LIPOSUCTION (LOCATION) Bilateral 2023    Procedure: LIPOSUCTIONED FROM ABDOMEN AND/OR FLANKS;  Surgeon: Quinn Bowden MD;  Location: [facility] OR    ORTHOPEDIC SURGERY      PROCURE GRAFT FAT Bilateral 2023    Procedure: SURGICAL PROCUREMENT, FAT GRAFT;  Surgeon: Quinn Bowden MD;  Location: [facility] OR    RECONSTRUCT NIPPLE BILATERAL Bilateral 2023    Procedure: BILATERAL NIPPLE AREOLAR RECONSTRUCTION, BILATERAL RECONSTRUCTED BREAST UPPER POLE FAT GRAFTING WITH FAT;  Surgeon: Quinn Bowden MD;  Location: [facility] OR    RESECT FIRST RIB Left 2015    Procedure: RESECT FIRST RIB;  Surgeon: Tien Yang MD;  Location: [facility] OR    REVISE RECONSTRUCTED BREAST Bilateral 2022    Procedure: BILATERAL RECONSTRUCTED BREAST REVISION,;  Surgeon: Quinn Bowden MD;  Location: [facility] OR    REVISE SCAR TRUNK N/A 2022    Procedure: ABDOMINAL SCAR REVISION;  Surgeon: Quinn Bowden MD;  Location: [facility] OR    REVISE SCAR TRUNK Right 2023    Procedure: REVISION, SCAR, TORSO, POSSIBLE RIGHT LOWER ABDOMINAL DOG-EAR EXCISION;  Surgeon: Quinn Bowden MD;  Location: [facility] OR       Social History     Socioeconomic History    Marital status:      Spouse name: Not on file    Number of children: Not on file    Years of education: Not on file    Highest education level: Not on file   Occupational History    Not on file   Tobacco Use    Smoking status: Former     Current packs/day: 0.00     Average packs/day: 0.3 packs/day for 5.0 years (1.3 ttl pk-yrs)     Types: Cigarettes     Start date: 2005     Quit date: 2010     Years since quittin.6     Passive exposure: Current    Smokeless tobacco: Never   Vaping Use    Vaping status: Never Used   Substance and Sexual Activity    Alcohol use: Yes     Comment: social - not  that often.  Maybe one glass/week.    Drug use: No    Sexual activity: Yes     Partners: Male     Birth control/protection: Surgical   Other Topics Concern    Parent/sibling w/ CABG, MI or angioplasty before 65F 55M? 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 She lives with her  and 3 children in a house with carpet. She shares yardwork detail with her . They have a dog and a cat.      Social Drivers of Health     Financial Resource Strain: Low Risk  (2/5/2024)    Financial Resource Strain     Within the past 12 months, have you or your family members you live with been unable to get utilities (heat, electricity) when it was really needed?: No   Food Insecurity: Low Risk  (2/5/2024)    Food Insecurity     Within the past 12 months, did you worry that your food would run out before you got money to buy more?: No     Within the past 12 months, did the food you bought just not last and you didn t have money to get more?: No   Transportation Needs: Low Risk  (2/5/2024)    Transportation Needs     Within the past 12 months, has lack of transportation kept you from medical appointments, getting your medicines, non-medical meetings or appointments, work, or from getting things that you need?: No   Physical Activity: Not on file   Stress: Not on file   Social Connections: Not on file   Interpersonal Safety: Low Risk  (2/5/2024)    Interpersonal Safety     Do you feel physically and emotionally safe where you currently live?: Yes     Within the past 12 months, have you been hit, slapped, kicked or otherwise physically hurt by someone?: No     Within the past 12 months, have you been humiliated or emotionally abused in other ways by your partner or ex-partner?: No   Housing Stability: Low Risk  (2/5/2024)    Housing Stability     Do you have housing? : Yes     Are you worried about losing your housing?: No       Family History   Problem Relation Age of Onset     Hypertension Mother     Cancer Mother     Breast Cancer Mother     Cardiovascular Mother     Depression Mother     Lipids Mother     Hypertension Father     Diabetes Father     C.A.D. Father     Neurologic Disorder Father     Cardiovascular Father     Other - See Comments Son         Autism      Family history reviewed and edited as appropriate    Medications and Allergies:     Outpatient Encounter Medications as of 1/7/2025   Medication Sig Dispense Refill    albuterol (ALBUTEROL) 108 (90 BASE) MCG/ACT inhaler Inhale 2 puffs into the lungs every 4 hours as needed for shortness of breath / dyspnea 1 Inhaler 5    alteplase (CATHFLO ACTIVASE) 2 MG injection by Injection route ONCE.      amphetamine-dextroamphetamine (ADDERALL XR) 10 MG 24 hr capsule Take 10 mg by mouth every morning      azelastine (OPTIVAR) 0.05 % ophthalmic solution Place 1 drop into both eyes 2 times daily as needed (for itchy eyes). 6 mL 11    BD PEN NEEDLE MAL 2ND GEN 32G X 4 MM miscellaneous USE TO INJECT OZEMPIC ONCE WEEKLY      cetirizine (ZYRTEC) 10 MG tablet Take 4 tablets (40 mg) by mouth At Bedtime 30 tablet     cyanocobalamin (VITAMIN B12) 1000 MCG/ML injection Inject 1 mL (1,000 mcg) into the muscle every 30 days Inject  intramuscular every 4 weeks.  May give multi-use vial. 10 mL 5    cycloSPORINE (RESTASIS) 0.05 % ophthalmic emulsion Place 1 drop into both eyes 2 times daily. 60 each 11    DUPIXENT 300 MG/2ML prefilled pen       EPIPEN 2-JOSE 0.3 MG/0.3ML injection Inject 0.3 mg into the muscle once as needed       famotidine (PEPCID) 10 MG tablet Take 10 mg by mouth daily      fluconazole (DIFLUCAN) 150 MG tablet Take one tablet at onset of yeast infection symptoms, may repeat dose every 3 days for up to 3 doses. 3 tablet 0    FOLIC ACID PO Take 3 mg by mouth daily      HEPARIN SODIUM, PORCINE, IJ       hydrOXYzine (ATARAX) 25 MG tablet Take 1-4 tablets ( mg) by mouth every 6 hours as needed for anxiety 120 tablet 1     ipratropium - albuterol 0.5 mg/2.5 mg/3 mL (DUONEB) 0.5-2.5 (3) MG/3ML nebulization Take 1 vial (3 mLs) by nebulization every 4 hours as needed for shortness of breath / dyspnea or wheezing 1 Box 11    methotrexate 50 MG/2ML injection Inject 0.8 mLs (20 mg) Subcutaneous once a week Hold until resumed by rheumatology. Previously receiving weekly on mondays      ondansetron (ZOFRAN ODT) 4 MG ODT tab Take 1 tablet (4 mg) by mouth every 8 hours as needed for nausea 12 tablet 0    ondansetron (ZOFRAN) 2 MG/ML SOLN injection Inject 2 mLs (4 mg) into the vein as needed 3 TIMES WEEKLY PRN with PRN IV infusions  May repeat x 1 per individual infusion.  Please page the nurse at [phone] or call [phone] Option 4 with questions about this order. 2000 mL 20    ondansetron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

## 2025-01-07 NOTE — NURSING NOTE
Current patient location: Patient declined to provide     Is the patient currently in the state of [place]? YES    Visit mode:VIDEO    If the visit is dropped, the patient can be reconnected by:VIDEO VISIT: Text to cell phone:   Telephone Information:   Mobile [phone]       Will anyone else be joining the visit? NO  (If patient encounters technical issues they should call [phone]    Are changes needed to the allergy or medication list? No    Are refills needed on medications prescribed by this physician? NO    Rooming Documentation:  Questionnaire(s) completed    Reason for visit: RECHECK    Gabi HALLF

## 2025-01-25 ENCOUNTER — HEALTH MAINTENANCE LETTER (OUTPATIENT)
Age: 45
End: 2025-01-25

## 2025-03-22 ENCOUNTER — HEALTH MAINTENANCE LETTER (OUTPATIENT)
Age: 45
End: 2025-03-22

## 2025-03-26 ENCOUNTER — TELEPHONE (OUTPATIENT)
Dept: GASTROENTEROLOGY | Facility: CLINIC | Age: 45
End: 2025-03-26
Payer: COMMERCIAL

## 2025-03-26 NOTE — TELEPHONE ENCOUNTER
Pre visit planning completed.      Procedure details:    Patient scheduled for Upper endoscopy (EGD) on 4/9/25.     Arrival time: 0715. Procedure time 0815    Facility location: [facility]; [address] [place] [address], [address] Floor, [place] [address]. Check in location: 5th Floor.    Sedation type: MAC -- Has had MAC at previous EGD's     Pre op exam needed? No.    Indication for procedure: Globus sensation [R09.A2]       Chart review:     Electronic implanted devices? No    Recent diagnosis of diverticulitis within the last 6 weeks? No      Medication review:    Diabetic?  Diabetes noted in medical hx, however this may have been gestational. Verify with the Pt.     Anticoagulants? Yes Unfractionated heparin: Recommended HOLD 6 hours if IV or 24 hours if subcutaneous before procedure. Consult with your managing provider. -- This may have been for the Pt's port in 2016 which likely is no longer present.     Weight loss medication/injectable? Yes. Ozempic (Semaglutide). Weekly dosing of medication.  HOLD 7 days before procedure.  Follow up with managing provider.  -- Unsure if this is for weight loss or DM. See above.     Other medication HOLDING recommendations:  N/A      Prep for procedure:         Procedure information and instructions sent via [employer]         Savana Lipscomb RN  Endoscopy Procedure Pre Assessment   [phone] option 3

## 2025-03-26 NOTE — TELEPHONE ENCOUNTER
Pre assessment completed for upcoming procedure.   (Please see previous telephone encounter notes for complete details)    Procedure details:    Arrival time and facility location reviewed.    Pre op exam needed? No.    Designated  policy reviewed. Instructed to have someone stay 24  hours post procedure.     Medication review:    Injectable diabetic medication(s): Ozempic (Semaglutide). Weekly dosing of medication.  HOLD 7 days before procedure.  Follow up with managing provider.     The patient has an IV port with heparin flushes that cannot be held.    Prep for procedure:     Procedure prep instructions reviewed.      Any additional information needed:  N/A    Patient verbalized understanding and had no questions or concerns at this time.      Larisa Berrios LPN  Endoscopy Procedure Pre Assessment   [phone] option 3

## 2025-04-02 ENCOUNTER — PATIENT OUTREACH (OUTPATIENT)
Dept: FAMILY MEDICINE | Facility: CLINIC | Age: 45
End: 2025-04-02
Payer: COMMERCIAL

## 2025-04-02 NOTE — TELEPHONE ENCOUNTER
Patient Quality Outreach    Patient is due for the following:   Diabetes -  A1C, LDL (Fasting), Microalbumin, and Foot Exam  Asthma  -  ACT needed  Physical Preventive Adult Physical      Topic Date Due    Zoster (Shingles) Vaccine (1 of 2) Never done    Hepatitis B Vaccine (1 of 3 - 19+ 3-dose series) Never done       Action(s) Taken:   Schedule a Adult Preventative  Patient was assigned appropriate questionnaire to complete    Type of outreach:    Sent [employer] message.    Questions for provider review:    None         CHAVO SMITH MA  Chart routed to None.

## 2025-04-08 ENCOUNTER — ANESTHESIA EVENT (OUTPATIENT)
Dept: SURGERY | Facility: AMBULATORY SURGERY CENTER | Age: 45
End: 2025-04-08
Payer: COMMERCIAL

## 2025-04-08 NOTE — ANESTHESIA PREPROCEDURE EVALUATION
Anesthesia Pre-Procedure Evaluation    Patient: Demetri Mosley   MRN: [id number] : 1980        Procedure : Procedure(s):  Esophagoscopy, gastroscopy, duodenoscopy (EGD), combined          Past Medical History:   Diagnosis Date     Allergic rhinitis due to animal dander      Anemia      Arthritis     rheumatoid     Chronic abdominal pain      Cyclic vomiting syndrome      Diabetes (H)     type 2     Diagnostic skin and sensitization tests (aka ALLERGENS) 2/19/15 IgE tests pos. for: cat/dog/DM/T/G/RW, NEG  for fish and shellfish.     DVT (deep venous thrombosis) (H) 2005    s/p long hospitalization - anticoagulated x 6 months     Endometriosis 2009    total hysterectomy     Gastro-oesophageal reflux disease      House dust mite allergy     2/19/15 IgE tests pos. for: cat/dog/DM/T/G/RW, NEG  for fish and shellfish.     Migraines      parotid mass 2012    benign, left, removed     PONV (postoperative nausea and vomiting)      Pyelonephritis due to Escherichia coli 05/15/2016     Uncomplicated asthma       Past Surgical History:   Procedure Laterality Date     CHOLECYSTECTOMY       COLONOSCOPY N/A 2016    Procedure: COLONOSCOPY;  Surgeon: Edvin Summers MD;  Location:  GI     ENT SURGERY      T&A     ESOPHAGOSCOPY, GASTROSCOPY, DUODENOSCOPY (EGD), COMBINED N/A 2016    Procedure: COMBINED ESOPHAGOSCOPY, GASTROSCOPY, DUODENOSCOPY (EGD), BIOPSY SINGLE OR MULTIPLE;  Surgeon: Edvin Summers MD;  Location:  GI     ESOPHAGOSCOPY, GASTROSCOPY, DUODENOSCOPY (EGD), COMBINED N/A 2022    Procedure: ESOPHAGOGASTRODUODENOSCOPY, WITH BIOPSY;  Surgeon: Tejas Orourke DO;  Location: [facility] OR     ESOPHAGOSCOPY, GASTROSCOPY, DUODENOSCOPY (EGD), DILATATION, COMBINED N/A 2017    Procedure: COMBINED ESOPHAGOSCOPY, GASTROSCOPY, DUODENOSCOPY (EGD), DILATATION;  Surgeon: Corry Franco MD;  Location:  OR     GI SURGERY      ERCP     GYN SURGERY      hysterectomy     HC CAPSULE  ENDOSCOPY N/A 9/13/2016    Procedure: CAPSULE/PILL CAM ENDOSCOPY;  Surgeon: Iraj Choe MD;  Location:  GI     HC ESOPH/GAS REFLUX TEST W NASAL IMPED >1 HR N/A 2/17/2016    Procedure: ESOPHAGEAL IMPEDENCE FUNCTION TEST WITH 24 HOUR PH GREATER THAN 1 HOUR;  Surgeon: Charan Ozuna MD;  Location:  GI     HC UGI ENDOSCOPY W EUS  5/30/2013    Procedure: COMBINED ENDOSCOPIC ULTRASOUND, ESOPHAGOSCOPY, GASTROSCOPY, DUODENOSCOPY (EGD);  Surgeon: Juan Yu MD;  Location:  GI     HEAD & NECK SURGERY      tumor removed     HYSTERECTOMY, PAP NO LONGER INDICATED       INJECT BLOCK MEDIAL BRANCH CERVICAL/THORACIC/LUMBAR Bilateral 3/25/2015    Procedure: INJECT BLOCK MEDIAL BRANCH CERVICAL / THORACIC / LUMBAR;  Surgeon: Malvin Rivera MD;  Location: [facility] OR     INSERT PORT VASCULAR ACCESS N/A 9/12/2016    Procedure: INSERT PORT VASCULAR ACCESS;  Surgeon: Juani Casiano MD;  Location: [facility] OR     IR CHEST PORT PLACEMENT > 5 YRS OF AGE  7/26/2016     LAPAROSCOPIC NISSEN FUNDOPLICATION N/A 12/23/2016    Procedure: LAPAROSCOPIC NISSEN FUNDOPLICATION;  Surgeon: Noah Brito MD;  Location: [facility] OR     LIPOSUCTION (LOCATION) Bilateral 4/19/2023    Procedure: LIPOSUCTIONED FROM ABDOMEN AND/OR FLANKS;  Surgeon: Quinn Bowden MD;  Location:  OR     ORTHOPEDIC SURGERY       PROCURE GRAFT FAT Bilateral 4/19/2023    Procedure: SURGICAL PROCUREMENT, FAT GRAFT;  Surgeon: Quinn Bowden MD;  Location: [facility] OR     RECONSTRUCT NIPPLE BILATERAL Bilateral 4/19/2023    Procedure: BILATERAL NIPPLE AREOLAR RECONSTRUCTION, BILATERAL RECONSTRUCTED BREAST UPPER POLE FAT GRAFTING WITH FAT;  Surgeon: Quinn Bowden MD;  Location: [facility] OR     RESECT FIRST RIB Left 5/1/2015    Procedure: RESECT FIRST RIB;  Surgeon: Tien Yang MD;  Location:  OR     REVISE RECONSTRUCTED BREAST Bilateral 12/14/2022    Procedure: BILATERAL RECONSTRUCTED BREAST REVISION,;  Surgeon: Quinn Bowden MD;   "Location: [facility] OR     REVISE SCAR TRUNK N/A 2022    Procedure: ABDOMINAL SCAR REVISION;  Surgeon: Quinn Bowden MD;  Location: [facility] OR     REVISE SCAR TRUNK Right 2023    Procedure: REVISION, SCAR, TORSO, POSSIBLE RIGHT LOWER ABDOMINAL DOG-EAR EXCISION;  Surgeon: Quinn Bowden MD;  Location: [facility] OR      Allergies   Allergen Reactions     Propranolol Shortness Of Breath            Compazine Hives     Severe muscle cramping and bad anxiety     Fluoxetine Other (See Comments)     Seizures     Gabapentin Swelling     Swelling of hands and feet     Metoclopramide Other (See Comments)     Very irritable.  Very irritable.     Metoclopramide Hcl Other (See Comments)     Very irritable.     Metronidazole      Other reaction(s): very irritable     Pregabalin Swelling     Pregabalin Swelling     Prochlorperazine Other (See Comments)     Severe muscle cramping and bad anxiety     Vancomycin Other (See Comments)     Able to tolerate with pre-medication (diphenhydramine) and slow rate.  \"crissy Syndrome\"     Wheat Diarrhea     Wheat GI Disturbance     Zolpidem      Other reaction(s): sleep walking     Adhesive Tape Rash     Skin irritation     Contrast Dye Rash     Ct contrast dye  Rash over entire trunk     Diatrizoate Rash     CT contrast  Ct contrast dye  Rash over entire trunk     Dye Fdc Red [Red Dye #40 (Allura Red)] Rash     FD&C RED #40     Latex Hives and Rash     Liquid Adhesive Rash     Other [No Clinical Screening - See Comments] Rash     Diatrizoate meglumine     Penicillins Rash      Social History     Tobacco Use     Smoking status: Former     Current packs/day: 0.00     Average packs/day: 0.3 packs/day for 5.0 years (1.3 ttl pk-yrs)     Types: Cigarettes     Start date: 2005     Quit date: 2010     Years since quittin.8     Passive exposure: Current     Smokeless tobacco: Never   Substance Use Topics     Alcohol use: Yes     Comment: social - not that often.  Maybe one " glass/week.      Wt Readings from Last 1 Encounters:   04/08/24 57.8 kg (127 lb 6.4 oz)        Anesthesia Evaluation   Pt has had prior anesthetic. Type: General.    History of anesthetic complications  - PONV.      ROS/MED HX  ENT/Pulmonary:     (+)                      asthma                  Neurologic:     (+)    peripheral neuropathy, - left arm CRPS. migraines,                          Cardiovascular:     (+)  hypertension- -   -  - -                                      METS/Exercise Tolerance:     Hematologic:     (+) History of blood clots,               Musculoskeletal:   (+)  arthritis,             GI/Hepatic: Comment: S/P Nissen Fundoplication.    Dysphagia    (+) GERD, Asymptomatic on medication,                  Renal/Genitourinary:  - neg Renal ROS     Endo:  - neg endo ROS     Psychiatric/Substance Use:  - neg psychiatric ROS     Infectious Disease:  - neg infectious disease ROS     Malignancy:  - neg malignancy ROS     Other:  - neg other ROS    (+)  , H/O Chronic Pain,         Physical Exam    Airway        Mallampati: I   TM distance: > 3 FB   Neck ROM: full   Mouth opening: > 3 cm    Respiratory Devices and Support         Dental       (+) Minor Abnormalities - some fillings, tiny chips      Cardiovascular   cardiovascular exam normal       Rhythm and rate: regular and normal     Pulmonary   pulmonary exam normal        breath sounds clear to auscultation       OUTSIDE LABS:  CBC:   Lab Results   Component Value Date    WBC 7.3 12/21/2016    WBC 4.1 11/10/2016    HGB 14.3 12/23/2016    HGB 14.3 12/21/2016    HCT 43.4 12/21/2016    HCT 40.9 11/10/2016     12/23/2016     12/21/2016     BMP:   Lab Results   Component Value Date     12/21/2016     11/09/2016    POTASSIUM 3.7 12/21/2016    POTASSIUM 3.8 11/09/2016    CHLORIDE 104 12/21/2016    CHLORIDE 108 11/09/2016    CO2 25 12/21/2016    CO2 24 11/09/2016    BUN 13 12/21/2016    BUN 9 11/09/2016    CR 0.83 12/23/2016    CR  0.79 12/21/2016    GLC 83 04/19/2023    GLC 85 12/14/2022     COAGS:   Lab Results   Component Value Date    PTT 25 09/12/2016    INR 1.13 09/12/2016     POC:   Lab Results   Component Value Date    HCG Negative 05/31/2016     HEPATIC:   Lab Results   Component Value Date    ALBUMIN 4.0 05/22/2019    PROTTOTAL 6.9 11/09/2016    ALT 26 11/09/2016    AST 20 11/09/2016    ALKPHOS 60 11/09/2016    BILITOTAL 0.8 11/09/2016     OTHER:   Lab Results   Component Value Date    LACT 0.3 (L) 08/13/2016    A1C 5.2 05/01/2015    OLE 9.3 12/21/2016    PHOS 3.6 05/25/2016    MAG 1.8 05/16/2016    LIPASE 114 08/12/2016    TSH 0.60 05/15/2016    CRP 4.8 08/13/2016    SED 8 06/24/2016       Anesthesia Plan    ASA Status:  3    NPO Status:  NPO Appropriate    Anesthesia Type: MAC.     - Reason for MAC: straight local not clinically adequate              Consents    Anesthesia Plan(s) and associated risks, benefits, and realistic alternatives discussed. Questions answered and patient/representative(s) expressed understanding.     - Discussed:     - Discussed with:  Patient      - Extended Intubation/Ventilatory Support Discussed: No.      - Patient is DNR/DNI Status: No     Use of blood products discussed: No .     Postoperative Care    Pain management: Oral pain medications, IV analgesics.   PONV prophylaxis: Background Propofol Infusion     Comments:               Hermelinda Dahl MD    Clinically Significant Risk Factors Present on Admission                   # Hypertension: Noted on problem list               # Asthma: noted on problem list

## 2025-04-09 ENCOUNTER — HOSPITAL ENCOUNTER (OUTPATIENT)
Facility: AMBULATORY SURGERY CENTER | Age: 45
Discharge: HOME | End: 2025-04-09
Attending: INTERNAL MEDICINE
Payer: COMMERCIAL

## 2025-04-09 ENCOUNTER — ANESTHESIA (OUTPATIENT)
Dept: SURGERY | Facility: AMBULATORY SURGERY CENTER | Age: 45
End: 2025-04-09
Payer: COMMERCIAL

## 2025-04-09 VITALS
WEIGHT: 127 LBS | DIASTOLIC BLOOD PRESSURE: 85 MMHG | RESPIRATION RATE: 15 BRPM | TEMPERATURE: 98 F | BODY MASS INDEX: 23.98 KG/M2 | SYSTOLIC BLOOD PRESSURE: 137 MMHG | HEART RATE: 93 BPM | HEIGHT: 61 IN | OXYGEN SATURATION: 96 %

## 2025-04-09 VITALS — HEART RATE: 140 BPM

## 2025-04-09 LAB
GLUCOSE BLDC GLUCOMTR-MCNC: 88 MG/DL (ref 70–99)
UPPER GI ENDOSCOPY: NORMAL

## 2025-04-09 PROCEDURE — 43249 ESOPH EGD DILATION <30 MM: CPT | Performed by: INTERNAL MEDICINE

## 2025-04-09 RX ORDER — ONDANSETRON 2 MG/ML
4 INJECTION INTRAMUSCULAR; INTRAVENOUS
Status: DISCONTINUED | OUTPATIENT
Start: 2025-04-09 | End: 2025-04-10 | Stop reason: HOSPADM

## 2025-04-09 RX ORDER — HEPARIN SODIUM,PORCINE 10 UNIT/ML
5-10 VIAL (ML) INTRAVENOUS
Status: DISCONTINUED | OUTPATIENT
Start: 2025-04-09 | End: 2025-04-10 | Stop reason: HOSPADM

## 2025-04-09 RX ORDER — HEPARIN SODIUM,PORCINE 10 UNIT/ML
5-10 VIAL (ML) INTRAVENOUS EVERY 24 HOURS
Status: DISCONTINUED | OUTPATIENT
Start: 2025-04-09 | End: 2025-04-10 | Stop reason: HOSPADM

## 2025-04-09 RX ORDER — SODIUM CHLORIDE, SODIUM LACTATE, POTASSIUM CHLORIDE, CALCIUM CHLORIDE 600; 310; 30; 20 MG/100ML; MG/100ML; MG/100ML; MG/100ML
INJECTION, SOLUTION INTRAVENOUS CONTINUOUS PRN
Status: DISCONTINUED | OUTPATIENT
Start: 2025-04-09 | End: 2025-04-09

## 2025-04-09 RX ORDER — LIDOCAINE 40 MG/G
CREAM TOPICAL
Status: DISCONTINUED | OUTPATIENT
Start: 2025-04-09 | End: 2025-04-10 | Stop reason: HOSPADM

## 2025-04-09 RX ORDER — HEPARIN SODIUM (PORCINE) LOCK FLUSH IV SOLN 100 UNIT/ML 100 UNIT/ML
5-10 SOLUTION INTRAVENOUS
Status: DISCONTINUED | OUTPATIENT
Start: 2025-04-09 | End: 2025-04-10 | Stop reason: HOSPADM

## 2025-04-09 RX ORDER — PROPOFOL 10 MG/ML
INJECTION, EMULSION INTRAVENOUS PRN
Status: DISCONTINUED | OUTPATIENT
Start: 2025-04-09 | End: 2025-04-09

## 2025-04-09 RX ORDER — KETAMINE HYDROCHLORIDE 10 MG/ML
INJECTION INTRAMUSCULAR; INTRAVENOUS PRN
Status: DISCONTINUED | OUTPATIENT
Start: 2025-04-09 | End: 2025-04-09

## 2025-04-09 RX ORDER — GLYCOPYRROLATE 0.2 MG/ML
INJECTION, SOLUTION INTRAMUSCULAR; INTRAVENOUS PRN
Status: DISCONTINUED | OUTPATIENT
Start: 2025-04-09 | End: 2025-04-09

## 2025-04-09 RX ORDER — PROPOFOL 10 MG/ML
INJECTION, EMULSION INTRAVENOUS CONTINUOUS PRN
Status: DISCONTINUED | OUTPATIENT
Start: 2025-04-09 | End: 2025-04-09

## 2025-04-09 RX ORDER — LIDOCAINE HYDROCHLORIDE 20 MG/ML
INJECTION, SOLUTION INFILTRATION; PERINEURAL PRN
Status: DISCONTINUED | OUTPATIENT
Start: 2025-04-09 | End: 2025-04-09

## 2025-04-09 RX ADMIN — KETAMINE HYDROCHLORIDE 30 MG: 10 INJECTION INTRAMUSCULAR; INTRAVENOUS at 08:28

## 2025-04-09 RX ADMIN — GLYCOPYRROLATE 0.3 MG: 0.2 INJECTION, SOLUTION INTRAMUSCULAR; INTRAVENOUS at 08:21

## 2025-04-09 RX ADMIN — HEPARIN SODIUM (PORCINE) LOCK FLUSH IV SOLN 100 UNIT/ML 5 ML: 100 SOLUTION at 09:24

## 2025-04-09 RX ADMIN — PROPOFOL 100 MG: 10 INJECTION, EMULSION INTRAVENOUS at 08:21

## 2025-04-09 RX ADMIN — LIDOCAINE HYDROCHLORIDE 100 MG: 20 INJECTION, SOLUTION INFILTRATION; PERINEURAL at 08:21

## 2025-04-09 RX ADMIN — SODIUM CHLORIDE, SODIUM LACTATE, POTASSIUM CHLORIDE, CALCIUM CHLORIDE: 600; 310; 30; 20 INJECTION, SOLUTION INTRAVENOUS at 07:55

## 2025-04-09 RX ADMIN — PROPOFOL 200 MCG/KG/MIN: 10 INJECTION, EMULSION INTRAVENOUS at 08:21

## 2025-04-09 NOTE — ANESTHESIA POSTPROCEDURE EVALUATION
Patient: Demetri Mosley    Procedure: Procedure(s):  Esophagoscopy, gastroscopy, duodenoscopy (EGD), dilatation, combined       Anesthesia Type:  MAC    Note:  Disposition: Outpatient   Postop Pain Control: Uneventful            Sign Out: Well controlled pain   PONV: No   Neuro/Psych: Uneventful            Sign Out: Acceptable/Baseline neuro status   Airway/Respiratory: Uneventful            Sign Out: Acceptable/Baseline resp. status   CV/Hemodynamics: Uneventful            Sign Out: Acceptable CV status; No obvious hypovolemia; No obvious fluid overload   Other NRE: NONE   DID A NON-ROUTINE EVENT OCCUR? No       Last vitals:  Vitals Value Taken Time   /85 04/09/25 0915   Temp 36.7  C (98  F) 04/09/25 0915   Pulse 93 04/09/25 0900   Resp 15 04/09/25 0915   SpO2 96 % 04/09/25 0915       Electronically Signed By: Hermelinda Dahl MD  April 9, 2025  9:56 AM

## 2025-04-09 NOTE — PROGRESS NOTES
Consent was performed in room and witnessed by patient's . The iPad consent form did not save. I have confirmed with the room that we are comfortable proceeding. All in agreement to proceed.     Tejas Orourke DO   of Medicine  Director, Esophageal Disorders Program  Director of Endoscopy  Division of Gastroenterology, Hepatology, and Nutrition  [facility]

## 2025-04-09 NOTE — ANESTHESIA CARE TRANSFER NOTE
Patient: Demetri Mosley    Procedure: Procedure(s):  Esophagoscopy, gastroscopy, duodenoscopy (EGD), dilatation, combined       Diagnosis: Globus sensation [R09.A2]  Diagnosis Additional Information: No value filed.    Anesthesia Type:   MAC     Note:    Oropharynx: oropharynx clear of all foreign objects and spontaneously breathing  Level of Consciousness: awake  Oxygen Supplementation: room air    Independent Airway: airway patency satisfactory and stable  Dentition: dentition unchanged  Vital Signs Stable: post-procedure vital signs reviewed and stable  Report to RN Given: handoff report given  Patient transferred to: Phase II  Comments: Report to Phase II RN  Handoff Report: Identifed the Patient, Identified the Reponsible Provider, Reviewed the pertinent medical history, Discussed the surgical course, Reviewed Intra-OP anesthesia mangement and issues during anesthesia, Set expectations for post-procedure period and Allowed opportunity for questions and acknowledgement of understanding      Vitals:  Vitals Value Taken Time   /81 04/09/25 0842   Temp     Pulse 122 04/09/25 0842   Resp     SpO2 89 % 04/09/25 0844   Vitals shown include unfiled device data.    Electronically Signed By: EDEL Velasquez CRNA  April 9, 2025  8:45 AM

## 2025-04-09 NOTE — H&P
Demetri Mosley  [id number]  female  45 year old      Reason for procedure/surgery:egd with dilation. Consent performed however did not save on iPad.     Patient Active Problem List   Diagnosis    H/O personality disorder    Attention deficit disorder    B-complex deficiency    Chronic pain syndrome    Persistent vomiting    Rheumatoid arthritis (H)    Spasm of sphincter of Oddi    Enthesopathy of hip region    Cyclic vomiting syndrome    Essential hypertension, benign    Urticaria    Intermittent asthma    Gastroesophageal reflux disease without esophagitis    Anxiety    Neck pain    CARDIOVASCULAR SCREENING; LDL GOAL LESS THAN 130    Shift work sleep disorder    Vitamin D deficiency    Portacath in place    House dust mite allergy    Allergic rhinitis due to animal dander    Seasonal allergic rhinitis    Diagnostic skin and sensitization tests (aka ALLERGENS)    Mastocytosis    TOS (thoracic outlet syndrome)    Primary hypercoagulable state [289.81]    Vomiting    Brachial neuritis or radiculitis    Pain of left hand    Mechanical problems with limbs    Hand weakness    Finger stiffness, left    Anemia, iron deficiency    Pyelonephritis due to Escherichia coli    Bacteremia    Encounter for blood transfusion    Fever, intermittent    History of recurrent urinary tract infection    Chronic urticaria    Chronic gastroesophageal reflux disease    GERD (gastroesophageal reflux disease)    Esophageal dysphagia    Globus sensation    History of Nissen fundoplication    Absence of both breasts    Immunodeficiency disorder    Adrenal hyperplasia       Past Surgical History:    Past Surgical History:   Procedure Laterality Date    CHOLECYSTECTOMY      COLONOSCOPY N/A 6/2/2016    Procedure: COLONOSCOPY;  Surgeon: Edvin Summers MD;  Location:  GI    ENT SURGERY      T&A    ESOPHAGOSCOPY, GASTROSCOPY, DUODENOSCOPY (EGD), COMBINED N/A 6/2/2016    Procedure: COMBINED ESOPHAGOSCOPY, GASTROSCOPY, DUODENOSCOPY (EGD),  BIOPSY SINGLE OR MULTIPLE;  Surgeon: Edvin Summers MD;  Location:  GI    ESOPHAGOSCOPY, GASTROSCOPY, DUODENOSCOPY (EGD), COMBINED N/A 8/8/2022    Procedure: ESOPHAGOGASTRODUODENOSCOPY, WITH BIOPSY;  Surgeon: Tejas Orourke DO;  Location: [facility] OR    ESOPHAGOSCOPY, GASTROSCOPY, DUODENOSCOPY (EGD), DILATATION, COMBINED N/A 2/1/2017    Procedure: COMBINED ESOPHAGOSCOPY, GASTROSCOPY, DUODENOSCOPY (EGD), DILATATION;  Surgeon: Corry Franco MD;  Location: [facility] OR    GI SURGERY      ERCP    GYN SURGERY      hysterectomy    HC CAPSULE ENDOSCOPY N/A 9/13/2016    Procedure: CAPSULE/PILL CAM ENDOSCOPY;  Surgeon: Iraj Choe MD;  Location:  GI    HC ESOPH/GAS REFLUX TEST W NASAL IMPED >1 HR N/A 2/17/2016    Procedure: ESOPHAGEAL IMPEDENCE FUNCTION TEST WITH 24 HOUR PH GREATER THAN 1 HOUR;  Surgeon: Charan Ozuna MD;  Location:  GI    HC UGI ENDOSCOPY W EUS  5/30/2013    Procedure: COMBINED ENDOSCOPIC ULTRASOUND, ESOPHAGOSCOPY, GASTROSCOPY, DUODENOSCOPY (EGD);  Surgeon: Juan Yu MD;  Location:  GI    HEAD & NECK SURGERY      tumor removed    HYSTERECTOMY, PAP NO LONGER INDICATED      INJECT BLOCK MEDIAL BRANCH CERVICAL/THORACIC/LUMBAR Bilateral 3/25/2015    Procedure: INJECT BLOCK MEDIAL BRANCH CERVICAL / THORACIC / LUMBAR;  Surgeon: Malvin Rivera MD;  Location: [facility]    INSERT PORT VASCULAR ACCESS N/A 9/12/2016    Procedure: INSERT PORT VASCULAR ACCESS;  Surgeon: Juani Casiano MD;  Location: [facility] OR    IR CHEST PORT PLACEMENT > 5 YRS OF AGE  7/26/2016    LAPAROSCOPIC NISSEN FUNDOPLICATION N/A 12/23/2016    Procedure: LAPAROSCOPIC NISSEN FUNDOPLICATION;  Surgeon: Noah Brito MD;  Location: [facility] OR    LIPOSUCTION (LOCATION) Bilateral 4/19/2023    Procedure: LIPOSUCTIONED FROM ABDOMEN AND/OR FLANKS;  Surgeon: Quinn Bowden MD;  Location: [facility] OR    ORTHOPEDIC SURGERY      PROCURE GRAFT FAT Bilateral 4/19/2023    Procedure: SURGICAL PROCUREMENT, FAT GRAFT;  Surgeon:  Quinn Bowden MD;  Location: [facility]    RECONSTRUCT NIPPLE BILATERAL Bilateral 4/19/2023    Procedure: BILATERAL NIPPLE AREOLAR RECONSTRUCTION, BILATERAL RECONSTRUCTED BREAST UPPER POLE FAT GRAFTING WITH FAT;  Surgeon: Quinn Bowden MD;  Location: [facility]    RESECT FIRST RIB Left 5/1/2015    Procedure: RESECT FIRST RIB;  Surgeon: Tien Yang MD;  Location: [facility] OR    REVISE RECONSTRUCTED BREAST Bilateral 12/14/2022    Procedure: BILATERAL RECONSTRUCTED BREAST REVISION,;  Surgeon: Quinn Bowden MD;  Location: [facility]    REVISE SCAR TRUNK N/A 12/14/2022    Procedure: ABDOMINAL SCAR REVISION;  Surgeon: Quinn Bowden MD;  Location: [facility]    REVISE SCAR TRUNK Right 4/19/2023    Procedure: REVISION, SCAR, TORSO, POSSIBLE RIGHT LOWER ABDOMINAL DOG-EAR EXCISION;  Surgeon: Quinn Bowden MD;  Location: [facility] OR       Past Medical History:   Past Medical History:   Diagnosis Date    Allergic rhinitis due to animal dander     Anemia     Arthritis     rheumatoid    Chronic abdominal pain     Cyclic vomiting syndrome     Diabetes (H)     type 2    Diagnostic skin and sensitization tests (aka ALLERGENS) 2/19/15 IgE tests pos. for: cat/dog/DM/T/G/RW, NEG  for fish and shellfish.    DVT (deep venous thrombosis) (H) 01/01/2005    s/p long hospitalization - anticoagulated x 6 months    Endometriosis 01/01/2009    total hysterectomy    Gastro-oesophageal reflux disease     House dust mite allergy     2/19/15 IgE tests pos. for: cat/dog/DM/T/G/RW, NEG  for fish and shellfish.    Migraines     parotid mass 01/01/2012    benign, left, removed    PONV (postoperative nausea and vomiting)     Pyelonephritis due to Escherichia coli 05/15/2016    Uncomplicated asthma        Social History:   Social History     Tobacco Use    Smoking status: Former     Current packs/day: 0.00     Average packs/day: 0.3 packs/day for 5.0 years (1.3 ttl pk-yrs)     Types: Cigarettes     Start date: 5/30/2005     Quit date:  "2010     Years since quittin.8     Passive exposure: Current    Smokeless tobacco: Never   Substance Use Topics    Alcohol use: Yes     Comment: social - not that often.  Maybe one glass/week.       Family History:   Family History   Problem Relation Age of Onset    Hypertension Mother     Cancer Mother     Breast Cancer Mother     Cardiovascular Mother     Depression Mother     Lipids Mother     Hypertension Father     Diabetes Father     C.A.D. Father     Neurologic Disorder Father     Cardiovascular Father     Other - See Comments Son         Autism        Allergies:   Allergies   Allergen Reactions    Propranolol Shortness Of Breath           Compazine Hives     Severe muscle cramping and bad anxiety    Fluoxetine Other (See Comments)     Seizures    Gabapentin Swelling     Swelling of hands and feet    Metoclopramide Other (See Comments)     Very irritable.  Very irritable.    Metoclopramide Hcl Other (See Comments)     Very irritable.    Metronidazole      Other reaction(s): very irritable    Pregabalin Swelling    Pregabalin Swelling    Prochlorperazine Other (See Comments)     Severe muscle cramping and bad anxiety    Vancomycin Other (See Comments)     Able to tolerate with pre-medication (diphenhydramine) and slow rate.  \"crissy Syndrome\"    Wheat Diarrhea    Wheat GI Disturbance    Zolpidem      Other reaction(s): sleep walking    Adhesive Tape Rash     Skin irritation    Contrast Dye Rash     Ct contrast dye  Rash over entire trunk    Diatrizoate Rash     CT contrast  Ct contrast dye  Rash over entire trunk    Dye Fdc Red [Red Dye #40 (Allura Red)] Rash     FD&C RED #40    Latex Hives and Rash    Liquid Adhesive Rash    Other [No Clinical Screening - See Comments] Rash     Diatrizoate meglumine    Penicillins Rash       Active Medications:   Current Outpatient Medications   Medication Sig Dispense Refill    albuterol (ALBUTEROL) 108 (90 BASE) MCG/ACT inhaler Inhale 2 puffs into the lungs every " 4 hours as needed for shortness of breath / dyspnea 1 Inhaler 5    amphetamine-dextroamphetamine (ADDERALL XR) 10 MG 24 hr capsule Take 10 mg by mouth every morning      azelastine (OPTIVAR) 0.05 % ophthalmic solution Place 1 drop into both eyes 2 times daily as needed (for itchy eyes). 6 mL 11    cetirizine (ZYRTEC) 10 MG tablet Take 4 tablets (40 mg) by mouth At Bedtime 30 tablet     cycloSPORINE (RESTASIS) 0.05 % ophthalmic emulsion Place 1 drop into both eyes 2 times daily. 60 each 11    famotidine (PEPCID) 10 MG tablet Take 10 mg by mouth daily      fluconazole (DIFLUCAN) 150 MG tablet Take one tablet at onset of yeast infection symptoms, may repeat dose every 3 days for up to 3 doses. 3 tablet 0    FOLIC ACID PO Take 3 mg by mouth daily      hydrOXYzine (ATARAX) 25 MG tablet Take 1-4 tablets ( mg) by mouth every 6 hours as needed for anxiety 120 tablet 1    ipratropium - albuterol 0.5 mg/2.5 mg/3 mL (DUONEB) 0.5-2.5 (3) MG/3ML nebulization Take 1 vial (3 mLs) by nebulization every 4 hours as needed for shortness of breath / dyspnea or wheezing 1 Box 11    ondansetron (ZOFRAN) 2 MG/ML SOLN injection Inject 2 mLs (4 mg) into the vein as needed 3 TIMES WEEKLY PRN with PRN IV infusions  May repeat x 1 per individual infusion.  Please page the nurse at [phone] or call [phone] Option 4 with questions about this order. 2000 mL 20    ondansetron (ZOFRAN-ODT) 8 MG disintegrating tablet Take 1 tablet (8 mg) by mouth every 8 hours as needed for nausea 1 tablet 3 times daily. If not covered, try solution at same dosing. 90 tablet 3    Prenatal Vit-Fe Fumarate-FA (PRENATAL MULTIVITAMIN  PLUS IRON) 27-0.8 MG TABS Take 1 tablet by mouth daily      tiZANidine (ZANAFLEX) 4 MG tablet Take 1 tablet (4 mg) by mouth every 6 hours as needed for muscle spasms TAKE ONE TABLET BY MOUTH EVERY SIX HOURS AS NEEDED FOR MUSCLE SPASM 90 tablet 3    alteplase (CATHFLO ACTIVASE) 2 MG injection by Injection route ONCE.      BD  "PEN NEEDLE MAL 2ND GEN 32G X 4 MM miscellaneous USE TO INJECT OZEMPIC ONCE WEEKLY      cyanocobalamin (VITAMIN B12) 1000 MCG/ML injection Inject 1 mL (1,000 mcg) into the muscle every 30 days Inject  intramuscular every 4 weeks.  May give multi-use vial. 10 mL 5    DUPIXENT 300 MG/2ML prefilled pen       EPIPEN 2-JOSE 0.3 MG/0.3ML injection Inject 0.3 mg into the muscle once as needed       HEPARIN SODIUM, PORCINE, IJ       methotrexate 50 MG/2ML injection Inject 0.8 mLs (20 mg) Subcutaneous once a week Hold until resumed by rheumatology. Previously receiving weekly on mondays      ondansetron (ZOFRAN ODT) 4 MG ODT tab Take 1 tablet (4 mg) by mouth every 8 hours as needed for nausea 12 tablet 0    semaglutide (OZEMPIC, 0.25 OR 0.5 MG/DOSE,) 2 MG/1.5ML SOPN pen Inject 0.5 mg Subcutaneous every 7 days      tocilizumab (ACTEMRA) 80 MG/4ML Reported on 3/16/2017         Systemic Review:   CONSTITUTIONAL: NEGATIVE for fever, chills, change in weight  ENT/MOUTH: NEGATIVE for ear, mouth and throat problems  RESP: NEGATIVE for significant cough or SOB  CV: NEGATIVE for chest pain, palpitations or peripheral edema    Physical Examination:   Vital Signs: /86   Temp 98  F (36.7  C) (Temporal)   Resp 16   Ht 1.549 m (5' 1\")   Wt 57.6 kg (127 lb)   LMP 11/21/2001   SpO2 100%   BMI 24.00 kg/m    GENERAL: healthy, alert and no distress  NECK: no adenopathy, no asymmetry, masses, or scars  RESP: lungs clear to auscultation - no rales, rhonchi or wheezes  CV: regular rate and rhythm, normal S1 S2, no S3 or S4, no murmur, click or rub, no peripheral edema and peripheral pulses strong  ABDOMEN: soft, nontender, no hepatosplenomegaly, no masses and bowel sounds normal  MS: no gross musculoskeletal defects noted, no edema    I examined patient s dentition and informed the patient that dental injuries are a risk of any anesthetic management. No concerns were noted with this patient's dentition. . The patient has consented to " proceed with the procedure.    Plan: Appropriate to proceed as scheduled.      Tejas Orourke DO  4/9/2025    PCP:  Nikolay Baeza

## 2025-06-14 ENCOUNTER — HEALTH MAINTENANCE LETTER (OUTPATIENT)
Age: 45
End: 2025-06-14

## 2025-07-05 ENCOUNTER — HEALTH MAINTENANCE LETTER (OUTPATIENT)
Age: 45
End: 2025-07-05

## 2025-07-09 ENCOUNTER — PATIENT OUTREACH (OUTPATIENT)
Dept: FAMILY MEDICINE | Facility: CLINIC | Age: 45
End: 2025-07-09
Payer: COMMERCIAL

## 2025-07-09 NOTE — TELEPHONE ENCOUNTER
Contacts       Contact Date/Time Type Contact Phone/Fax    07/09/2025 10:42 AM CDT Phone (Outgoing) Demetri Mosley (Self) [phone] (M)    Left Message           Attempted to reach patient to: Schedule an appointment    When patient returns call, please take this action: Assist with scheduling    Reason for the visit: preventative and diabetes follow up    When to schedule: Next available    Additional comments/info: Left message on patient's voicemail to call back.    If unable to schedule: Add a note to the telephone encounter noting the barrier and route back to primary care team mac [facility]

## 2025-07-09 NOTE — TELEPHONE ENCOUNTER
Patient Quality Outreach    Patient is due for the following:   Diabetes -  A1C, Microalbumin, Diabetic Follow-Up Visit, and Foot Exam  Asthma  -  ACT needed  Physical Preventive Adult Physical      Topic Date Due    Zoster (Shingles) Vaccine (1 of 2) Never done    Hepatitis B Vaccine (1 of 3 - 19+ 3-dose series) Never done    COVID-19 Vaccine (7 - 2024-25 season) 04/28/2025       Action(s) Taken:   Schedule a office visit for diabetes recheck and Adult Preventative    Type of outreach:    Please contact the patient to schedule appointments requested above.    Questions for provider review:    None         CHAVO SMITH MA  Chart routed to Care Team.

## (undated) DEVICE — PREP CHLORAPREP 26ML TINTED ORANGE  260815

## (undated) DEVICE — ENDO TUBING INSUFFLATOR CO2 EFFICIENT 710324

## (undated) DEVICE — SUCTION MANIFOLD NEPTUNE 2 SYS 1 PORT 702-025-000

## (undated) DEVICE — SPONGE LAP 18X18" X8435

## (undated) DEVICE — DRAPE U SPLIT 74X120" 29440

## (undated) DEVICE — GOWN IMPERVIOUS 2XL BLUE

## (undated) DEVICE — DECANTER TRANSFER DEVICE 2008S

## (undated) DEVICE — BNDG ELASTIC 6"X5YDS UNSTERILE 6611-60

## (undated) DEVICE — PACK MINOR SBA15MIFSE

## (undated) DEVICE — ESU PENCIL SMOKE EVAC W/ROCKER SWITCH 0703-047-000

## (undated) DEVICE — DRAPE IOBAN INCISE 23X17" 6650EZ

## (undated) DEVICE — SU CHROMIC 4-0 P-3 18" 1654G

## (undated) DEVICE — ENDO TUBING CO2 SMARTCAP STERILE DISP 100145CO2EXT

## (undated) DEVICE — SOL WATER IRRIG 500ML BOTTLE 2F7113

## (undated) DEVICE — DRSG STERI STRIP 1/4X3" R1541

## (undated) DEVICE — DRSG KERLIX FLUFFS X5

## (undated) DEVICE — INFLATION DEVICE BIG 60 ENDO-AN6012

## (undated) DEVICE — SOL NACL 0.9% IRRIG 1000ML BOTTLE 07138-09

## (undated) DEVICE — SU PDS II 0 CT-1 27" Z340H

## (undated) DEVICE — TUBING SUCTION 12"X1/4" N612

## (undated) DEVICE — SU MONOCRYL 3-0 PS-2 27" Y427H

## (undated) DEVICE — GLOVE BIOGEL PI ULTRATOUCH G SZ 6.5 42165

## (undated) DEVICE — ENDO BITE BLOCK ADULT OMNI-BLOC

## (undated) DEVICE — BLADE KNIFE SURG 11 371111

## (undated) DEVICE — MANIFOLD NEPTUNE 4 PORT 700-20

## (undated) DEVICE — SU VICRYL 2-0 CT-2 27" UND J269H

## (undated) DEVICE — SU PLAIN FAST ABSORB 5-0 PC-1 18" 1915G

## (undated) DEVICE — PREP SKIN SCRUB TRAY 4461A

## (undated) DEVICE — SPONGE LAP 18X18" 1515

## (undated) DEVICE — STPL SKIN 35W 054887

## (undated) DEVICE — ESU ELEC BLADE 2.75" COATED/INSULATED E1455

## (undated) DEVICE — GLOVE BIOGEL PI MICRO SZ 7.5 48575

## (undated) DEVICE — SOL RINGERS LACTATED 1000ML BAG 2B2324X

## (undated) DEVICE — SUCTION TIP YANKAUER W/O VENT K86

## (undated) DEVICE — DRSG BANDAID 1X3" FABRIC CURITY LATEX FREE KC44101

## (undated) DEVICE — SYR 10ML LL W/O NDL

## (undated) DEVICE — SOL ADH LIQUID BENZOIN SWAB 0.6ML C1544

## (undated) DEVICE — SU PDS II 4-0 P-3 18" Z494G

## (undated) DEVICE — GLOVE BIOGEL PI ULTRATOUCH G SZ 7.0 42170

## (undated) DEVICE — CATH BALLOON MERIT ESOPH FIVE-STAGE 17X21MMX180CM EX18

## (undated) DEVICE — SU VICRYL 2-0 SH 27" UND J417H

## (undated) DEVICE — KIT ENDO TURNOVER/PROCEDURE CARRY-ON 101822

## (undated) DEVICE — GLOVE PROTEXIS W/NEU-THERA 7.5  2D73TE75

## (undated) DEVICE — SYR 30ML SLIP TIP W/O NDL 302833

## (undated) DEVICE — GLOVE EXAM NITRILE LG PF LATEX FREE 5064

## (undated) DEVICE — ESU ELEC BLADE HEX-LOCKING 2.5" E1450X

## (undated) DEVICE — DRAPE SPLIT EENT 76X124" 3X28" 9447

## (undated) DEVICE — SOL WATER IRRIG 1000ML BOTTLE 07139-09

## (undated) DEVICE — DRAPE SHEET HALF 40X60" 9358

## (undated) DEVICE — SYR 50ML CATH TIP W/O NDL 309620

## (undated) DEVICE — NDL 19GA 1.5"

## (undated) DEVICE — BNDG ABDOMINAL BINDER 09X46-62"

## (undated) DEVICE — JELLY LUBRICATING SURGILUBE 2OZ TUBE

## (undated) DEVICE — BNDG ABDOMINAL BINDER 9X30-45" 79-89070

## (undated) DEVICE — ESU ELEC NDL 1" COATED/INSULATED E1465

## (undated) DEVICE — GLOVE BIOGEL PI MICRO INDICATOR UNDERGLOVE SZ 7.5 48975

## (undated) DEVICE — DRAPE SHEET 3/4 78X60"

## (undated) DEVICE — ESU GROUND PAD ADULT W/CORD E7507

## (undated) DEVICE — DRSG STERI STRIP 1/2X4" R1547

## (undated) DEVICE — TUBING INFUSION INFILTRATION LIPOSUCTION 156" 24-6008

## (undated) DEVICE — TUBING SUCTION 10'X3/16" N510

## (undated) DEVICE — CLOSURE SYS SKIN PREMIERPRO EXOFIN FUSION 4X22CM STRL 3472

## (undated) DEVICE — DRSG TEGADERM 2 3/8X2 3/4" 1624W

## (undated) DEVICE — DRSG KERLIX 4 1/2"X4YDS ROLL 6715

## (undated) DEVICE — SUCTION TUBING 10' N1010

## (undated) DEVICE — DRSG XEROFORM 1X8"

## (undated) DEVICE — KIT ENDO FIRST STEP DISINFECTANT 200ML W/POUCH EP-4

## (undated) DEVICE — SYR BULB IRRIG DOVER 60 ML LATEX FREE 67000

## (undated) DEVICE — DRSG TEGADERM 4X4 3/4" 1626W

## (undated) DEVICE — BNDG ELASTIC 6" DBL LENGTH UNSTERILE 6611-16

## (undated) DEVICE — SU STRATAFIX MONOCRYL 3-0 SPIRAL PS-2 45CM SXMP1B107

## (undated) DEVICE — SOL NACL 0.9% INJ 1000ML BAG 07983-09

## (undated) DEVICE — DRAPE LAP W/ARMBOARD 29410

## (undated) DEVICE — TUBING SUCTION MEDI-VAC 1/4"X20' N620A

## (undated) DEVICE — ADH SKIN CLOSURE PREMIERPRO EXOFIN 1.0ML 3470

## (undated) DEVICE — STPL SKIN 35W 6.9MM  PXW35

## (undated) DEVICE — MARKER SKIN DOUBLE TIP W/FLEXI-RULER W/LABELS

## (undated) DEVICE — SU MONOCRYL 2-0 SH 27" UND Y417H

## (undated) DEVICE — SYR 50ML SLIP TIP W/O NDL 309654

## (undated) DEVICE — ENDO CAP AND TUBING STERILE FOR ENDOGATOR  100130

## (undated) DEVICE — ENDO CONNECTOR ENDOGATOR AUX WATER JET FOR OLYMPUS SCOPE

## (undated) DEVICE — BLADE KNIFE SURG 10 371110

## (undated) DEVICE — SOL WATER IRRIG 1000ML BOTTLE 2F7114

## (undated) DEVICE — CATH BALLOON MERIT ESOPH FIVE-STAGE 11X16MMX180CM EX12

## (undated) DEVICE — GLOVE BIOGEL PI MICRO INDICATOR UNDERGLOVE SZ 7.0 48970

## (undated) DEVICE — WIRE GUIDE AMPLATZ SUPER STIFF 0.035"X260CM STR M001465090

## (undated) DEVICE — SUCTION CATH AIRLIFE TRI-FLO W/CONTROL PORT 14FR  T60C

## (undated) DEVICE — DRSG GAUZE 4X4" 3033

## (undated) DEVICE — SYR 50ML LL W/O NDL 309653

## (undated) RX ORDER — CLINDAMYCIN PHOSPHATE 900 MG/50ML
INJECTION, SOLUTION INTRAVENOUS
Status: DISPENSED
Start: 2023-04-19

## (undated) RX ORDER — PROPOFOL 10 MG/ML
INJECTION, EMULSION INTRAVENOUS
Status: DISPENSED
Start: 2022-12-14

## (undated) RX ORDER — LIDOCAINE HYDROCHLORIDE 10 MG/ML
INJECTION, SOLUTION EPIDURAL; INFILTRATION; INTRACAUDAL; PERINEURAL
Status: DISPENSED
Start: 2023-04-19

## (undated) RX ORDER — DEXMEDETOMIDINE HYDROCHLORIDE 4 UG/ML
INJECTION, SOLUTION INTRAVENOUS
Status: DISPENSED
Start: 2023-04-19

## (undated) RX ORDER — DEXAMETHASONE SODIUM PHOSPHATE 4 MG/ML
INJECTION, SOLUTION INTRA-ARTICULAR; INTRALESIONAL; INTRAMUSCULAR; INTRAVENOUS; SOFT TISSUE
Status: DISPENSED
Start: 2022-12-14

## (undated) RX ORDER — HEPARIN SODIUM (PORCINE) LOCK FLUSH IV SOLN 100 UNIT/ML 100 UNIT/ML
SOLUTION INTRAVENOUS
Status: DISPENSED
Start: 2017-02-01

## (undated) RX ORDER — OXYCODONE HYDROCHLORIDE 5 MG/1
TABLET ORAL
Status: DISPENSED
Start: 2022-12-14

## (undated) RX ORDER — BUPIVACAINE HYDROCHLORIDE AND EPINEPHRINE 2.5; 5 MG/ML; UG/ML
INJECTION, SOLUTION INFILTRATION; PERINEURAL
Status: DISPENSED
Start: 2023-04-19

## (undated) RX ORDER — EPINEPHRINE 1 MG/ML
INJECTION, SOLUTION INTRAMUSCULAR; SUBCUTANEOUS
Status: DISPENSED
Start: 2023-04-19

## (undated) RX ORDER — GINSENG 100 MG
CAPSULE ORAL
Status: DISPENSED
Start: 2023-04-19

## (undated) RX ORDER — FENTANYL CITRATE 50 UG/ML
INJECTION, SOLUTION INTRAMUSCULAR; INTRAVENOUS
Status: DISPENSED
Start: 2023-04-19

## (undated) RX ORDER — ACETAMINOPHEN 325 MG/1
TABLET ORAL
Status: DISPENSED
Start: 2022-12-14

## (undated) RX ORDER — PROPOFOL 10 MG/ML
INJECTION, EMULSION INTRAVENOUS
Status: DISPENSED
Start: 2023-04-19

## (undated) RX ORDER — DEXAMETHASONE SODIUM PHOSPHATE 4 MG/ML
INJECTION, SOLUTION INTRA-ARTICULAR; INTRALESIONAL; INTRAMUSCULAR; INTRAVENOUS; SOFT TISSUE
Status: DISPENSED
Start: 2023-04-19

## (undated) RX ORDER — BUPIVACAINE HYDROCHLORIDE 2.5 MG/ML
INJECTION, SOLUTION EPIDURAL; INFILTRATION; INTRACAUDAL
Status: DISPENSED
Start: 2023-04-19

## (undated) RX ORDER — FENTANYL CITRATE-0.9 % NACL/PF 10 MCG/ML
PLASTIC BAG, INJECTION (ML) INTRAVENOUS
Status: DISPENSED
Start: 2023-04-19

## (undated) RX ORDER — BUPIVACAINE HYDROCHLORIDE 2.5 MG/ML
INJECTION, SOLUTION INFILTRATION; PERINEURAL
Status: DISPENSED
Start: 2022-12-14

## (undated) RX ORDER — BUPIVACAINE HYDROCHLORIDE 2.5 MG/ML
INJECTION, SOLUTION INFILTRATION; PERINEURAL
Status: DISPENSED
Start: 2023-04-19

## (undated) RX ORDER — HEPARIN SODIUM (PORCINE) LOCK FLUSH IV SOLN 100 UNIT/ML 100 UNIT/ML
SOLUTION INTRAVENOUS
Status: DISPENSED
Start: 2023-01-19

## (undated) RX ORDER — HEPARIN SODIUM (PORCINE) LOCK FLUSH IV SOLN 100 UNIT/ML 100 UNIT/ML
SOLUTION INTRAVENOUS
Status: DISPENSED
Start: 2022-08-08

## (undated) RX ORDER — ONDANSETRON 2 MG/ML
INJECTION INTRAMUSCULAR; INTRAVENOUS
Status: DISPENSED
Start: 2022-12-14

## (undated) RX ORDER — LIDOCAINE HYDROCHLORIDE 5 MG/ML
INJECTION, SOLUTION INFILTRATION; PERINEURAL
Status: DISPENSED
Start: 2023-04-19

## (undated) RX ORDER — FENTANYL CITRATE-0.9 % NACL/PF 10 MCG/ML
PLASTIC BAG, INJECTION (ML) INTRAVENOUS
Status: DISPENSED
Start: 2022-12-14

## (undated) RX ORDER — HEPARIN SODIUM (PORCINE) LOCK FLUSH IV SOLN 100 UNIT/ML 100 UNIT/ML
SOLUTION INTRAVENOUS
Status: DISPENSED
Start: 2025-04-09

## (undated) RX ORDER — GINSENG 100 MG
CAPSULE ORAL
Status: DISPENSED
Start: 2022-12-14

## (undated) RX ORDER — FENTANYL CITRATE 50 UG/ML
INJECTION, SOLUTION INTRAMUSCULAR; INTRAVENOUS
Status: DISPENSED
Start: 2022-12-14

## (undated) RX ORDER — ACETAMINOPHEN 325 MG/1
TABLET ORAL
Status: DISPENSED
Start: 2023-04-19

## (undated) RX ORDER — ONDANSETRON 2 MG/ML
INJECTION INTRAMUSCULAR; INTRAVENOUS
Status: DISPENSED
Start: 2023-04-19

## (undated) RX ORDER — FENTANYL CITRATE 50 UG/ML
INJECTION, SOLUTION INTRAMUSCULAR; INTRAVENOUS
Status: DISPENSED
Start: 2017-02-03

## (undated) RX ORDER — CLINDAMYCIN PHOSPHATE 900 MG/50ML
INJECTION, SOLUTION INTRAVENOUS
Status: DISPENSED
Start: 2022-12-14

## (undated) RX ORDER — LIDOCAINE HYDROCHLORIDE AND EPINEPHRINE 10; 10 MG/ML; UG/ML
INJECTION, SOLUTION INFILTRATION; PERINEURAL
Status: DISPENSED
Start: 2023-04-19